# Patient Record
Sex: FEMALE | Race: WHITE | Employment: OTHER | URBAN - METROPOLITAN AREA
[De-identification: names, ages, dates, MRNs, and addresses within clinical notes are randomized per-mention and may not be internally consistent; named-entity substitution may affect disease eponyms.]

---

## 2017-01-16 ENCOUNTER — OFFICE VISIT (OUTPATIENT)
Dept: FAMILY MEDICINE | Facility: CLINIC | Age: 82
End: 2017-01-16
Payer: MEDICARE

## 2017-01-16 VITALS
WEIGHT: 119 LBS | SYSTOLIC BLOOD PRESSURE: 114 MMHG | TEMPERATURE: 97.9 F | BODY MASS INDEX: 20.32 KG/M2 | HEART RATE: 76 BPM | HEIGHT: 64 IN | DIASTOLIC BLOOD PRESSURE: 60 MMHG

## 2017-01-16 DIAGNOSIS — Z78.0 ASYMPTOMATIC POSTMENOPAUSAL STATUS: ICD-10-CM

## 2017-01-16 DIAGNOSIS — Z23 NEED FOR PROPHYLACTIC VACCINATION AGAINST STREPTOCOCCUS PNEUMONIAE (PNEUMOCOCCUS): ICD-10-CM

## 2017-01-16 DIAGNOSIS — M25.561 ACUTE PAIN OF RIGHT KNEE: ICD-10-CM

## 2017-01-16 DIAGNOSIS — I10 HYPERTENSION GOAL BP (BLOOD PRESSURE) < 140/90: ICD-10-CM

## 2017-01-16 DIAGNOSIS — R05.3 CHRONIC COUGH: ICD-10-CM

## 2017-01-16 DIAGNOSIS — R39.15 URINARY URGENCY: Primary | ICD-10-CM

## 2017-01-16 DIAGNOSIS — R41.3 MEMORY PROBLEM: ICD-10-CM

## 2017-01-16 PROCEDURE — 99214 OFFICE O/P EST MOD 30 MIN: CPT | Performed by: PHYSICIAN ASSISTANT

## 2017-01-16 RX ORDER — CARVEDILOL 25 MG/1
25 TABLET ORAL 2 TIMES DAILY WITH MEALS
Qty: 180 TABLET | Refills: 1 | Status: SHIPPED | OUTPATIENT
Start: 2017-01-16 | End: 2017-08-04

## 2017-01-16 RX ORDER — OXYBUTYNIN CHLORIDE 5 MG/1
TABLET ORAL
Qty: 30 TABLET | Refills: 0 | Status: SHIPPED | OUTPATIENT
Start: 2017-01-16 | End: 2017-06-26

## 2017-01-16 RX ORDER — AMLODIPINE BESYLATE 5 MG/1
5 TABLET ORAL DAILY
Qty: 90 TABLET | Refills: 1 | Status: SHIPPED | OUTPATIENT
Start: 2017-01-16 | End: 2017-10-30

## 2017-01-16 ASSESSMENT — PAIN SCALES - GENERAL: PAINLEVEL: SEVERE PAIN (6)

## 2017-01-16 NOTE — NURSING NOTE
"Chief Complaint   Patient presents with     Nocturia     Follow up - Ongoing issue, no pain or blood in urine. Pt states that she gets up about 5 times per night.      Knee Pain     Right knee pain x3 days        Initial /60 mmHg  Pulse 76  Temp(Src) 97.9  F (36.6  C) (Oral)  Ht 5' 3.5\" (1.613 m)  Wt 119 lb (53.978 kg)  BMI 20.75 kg/m2 Estimated body mass index is 20.75 kg/(m^2) as calculated from the following:    Height as of this encounter: 5' 3.5\" (1.613 m).    Weight as of this encounter: 119 lb (53.978 kg).  BP completed using cuff size: july Brown CMA ([facility])      "

## 2017-01-16 NOTE — MR AVS SNAPSHOT
After Visit Summary   1/16/2017    Ghada Magana    MRN: [id number]           Patient Information     Date Of Birth          1935        Visit Information        Provider Department      1/16/2017 9:00 AM Chip Barlow PA-C [facility]        Today's Diagnoses     Urinary urgency    -  1     Chronic cough         Memory problem         Asymptomatic postmenopausal status         Need for prophylactic vaccination against Streptococcus pneumoniae (pneumococcus)         Acute pain of right knee         Hypertension goal BP (blood pressure) < 140/90           Care Instructions    1. Dr. [facility] - [phone] - she needs to be reevaluated for mycobacterium (type of bacterial infection) - probably need CT scan and possibly scope into lungs to look. Won't do antibiotics today - Dr. Woodall needs to determine the best ones  2. Memory problem - think about seeing memory clinic at [place]   3. Right knee, - ice 10 minutes 2-3 times a day, can take 1 to 2, 200 mg ibuprofen, if Voltaren (Diclofenac) cream is paid for by insurance, can use that   4. Ditropan (Oxybutinin) can use 1/2 at bedtime for urinary frequency   5. Use Refresh eye drops for dry eyes           Follow-ups after your visit        Additional Services     NEUROLOGY ADULT REFERRAL       Your provider has referred you to: [facility]: Neurology Clinic - [place] [phone]   [email or web address]  General Neurology - Memory Problem     Reason for Referral: Consult    Please be aware that coverage of these services is subject to the terms and limitations of your health insurance plan.  Call member services at your health plan with any benefit or coverage questions.      Please bring the following with you to your appointment:    (1) Any X-Rays, CTs or MRIs which have been performed.  Contact the facility where they were done to arrange for   "prior to your scheduled appointment.    (2) List of current medications  (3) This referral request   (4) Any documents/labs given to you for this referral                  Who to contact     If you have questions or need follow up information about today's clinic visit or your schedule please contact [facility] directly at [phone].  Normal or non-critical lab and imaging results will be communicated to you by MyChart, letter or phone within 4 business days after the clinic has received the results. If you do not hear from us within 7 days, please contact the clinic through Smartesting[employer] or phone. If you have a critical or abnormal lab result, we will notify you by phone as soon as possible.  Submit refill requests through [employer] or call your pharmacy and they will forward the refill request to us. Please allow 3 business days for your refill to be completed.          Additional Information About Your Visit        MyChart Information     [employer] lets you send messages to your doctor, view your test results, renew your prescriptions, schedule appointments and more. To sign up, go to [email or web address] . Click on \"Log in\" on the left side of the screen, which will take you to the Welcome page. Then click on \"Sign up Now\" on the right side of the page.     You will be asked to enter the access code listed below, as well as some personal information. Please follow the directions to create your username and password.     Your access code is: [email or web address]  Expires: 2017  9:38 AM     Your access code will  in 90 days. If you need help or a new code, please call your [place] clinic or [phone].        Care EveryWhere ID     This is your Care EveryWhere ID. This could be used by other organizations to access your [place] medical records  [id number]        Your Vitals Were     Pulse Temperature Height BMI (Body Mass Index)          76 97.9  F (36.6  C) (Oral) 5' 3.5\" (1.613 m) 20.75 " kg/m2         Blood Pressure from Last 3 Encounters:   01/16/17 114/60   05/12/16 132/68   03/28/16 171/76    Weight from Last 3 Encounters:   01/16/17 119 lb (53.978 kg)   05/12/16 121 lb (54.885 kg)   03/28/16 119 lb 12.8 oz (54.341 kg)              We Performed the Following     *UA reflex to Microscopic and Culture ([facility] [place] and [facility] (except [place] and [place])     NEUROLOGY ADULT REFERRAL          Today's Medication Changes          These changes are accurate as of: 1/16/17  9:38 AM.  If you have any questions, ask your nurse or doctor.               Start taking these medicines.        Dose/Directions    diclofenac 1 % Gel topical gel   Commonly known as:  VOLTAREN   Used for:  Acute pain of right knee   Started by:  Chip Barlow PA-C        Apply 4 grams to knees or 2 grams to hands four times daily using enclosed dosing card.   Quantity:  100 g   Refills:  1         These medicines have changed or have updated prescriptions.        Dose/Directions    oxybutynin 5 MG tablet   Commonly known as:  DITROPAN   This may have changed:  additional instructions   Used for:  Urinary urgency   Changed by:  Chip Barlow PA-C        1/2 tablet at bedtime   Quantity:  30 tablet   Refills:  0            Where to get your medicines      These medications were sent to [facility] [address] [place] [address] [address] [facility]  [address] [facility] [address]     Phone:  [phone]    - amLODIPine 5 MG tablet  - carvedilol 25 MG tablet  - diclofenac 1 % Gel topical gel  - oxybutynin 5 MG tablet             Primary Care Provider Office Phone # Fax #    Chip Barlow PA-C [phone]       [facility] [address] [facility] [address]        Thank you!     Thank you for choosing [facility]  for your care. Our goal is always to provide you with excellent care. Hearing back from our patients is one way we can  continue to improve our services. Please take a few minutes to complete the written survey that you may receive in the mail after your visit with us. Thank you!             Your Updated Medication List - Protect others around you: Learn how to safely use, store and throw away your medicines at www.disposemymeds.org.          This list is accurate as of: 1/16/17  9:38 AM.  Always use your most recent med list.                   Brand Name Dispense Instructions for use    amLODIPine 5 MG tablet    NORVASC    90 tablet    Take 1 tablet (5 mg) by mouth daily       aspirin 81 MG tablet     100    1 tab po QD (Once per day)       CALCIUM 600 + D 600-200 MG-UNIT Tabs     0    2 per day.       carvedilol 25 MG tablet    COREG    180 tablet    Take 1 tablet (25 mg) by mouth 2 times daily (with meals)       diclofenac 1 % Gel topical gel    VOLTAREN    100 g    Apply 4 grams to knees or 2 grams to hands four times daily using enclosed dosing card.       fluticasone-salmeterol 115-21 MCG/ACT Inhaler    ADVAIR-HFA    36 g    Inhale 2 puffs into the lungs 2 times daily       losartan 100 MG tablet    COZAAR    90 tablet    Take 1 tablet (100 mg) by mouth daily       Multi-vitamin Tabs tablet   Generic drug:  multivitamin, therapeutic with minerals      take one daily       oxybutynin 5 MG tablet    DITROPAN    30 tablet    1/2 tablet at bedtime       TYLENOL 325 MG tablet   Generic drug:  acetaminophen      2 TABLETS EVERY 4 HOURS AS NEEDED       UNKNOWN MED DOSAGE      timolol eye gtts one drop in each eye twice daily

## 2017-01-16 NOTE — PROGRESS NOTES
"  SUBJECTIVE:                                                    Ghada Magana is a 81 year old female who presents to clinic today for the following health issues:    Joint Pain     Onset: 3 days      Description:   Location: Right knee   Character: Sharp and Dull ache    Intensity: moderate, having trouble getting out of bed.    Progression of Symptoms: worse    Accompanying Signs & Symptoms:  Other symptoms: swelling and weakness of right leg    History:   Previous similar pain: no       Precipitating factors:   Trauma or overuse: no     Alleviating factors:  Improved by: heat       Therapies Tried and outcome: Heat was somewhat helpful     Nocturia - up to urinate 5 times at night. This has been ongoing for years. She's not bothered by it. More a concern of her 's. She didn't try the oxybutynin we discussed. She has no new or differing symptoms.     Cough - chronic, multi year. Has seen [facility] Dr. Woodall at [facility]. 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 No COPD. No other known lung disease.     BP stable, no concerns - needs medication refills.    Her  is very worried about memory. He reports he's finding things where they don't below. Abbey admits she's forgetting names and sometimes \"forgetful.\" She's not forthcoming other than that.     Problem list and histories reviewed & adjusted, as indicated.  Additional history: as documented    Problem list, Medication list, Allergies, and Medical/Social/Surgical histories reviewed in [facility] and updated as appropriate.    ROS:  Constitutional, HEENT, cardiovascular, pulmonary, gi and gu systems are negative, except as otherwise noted.    OBJECTIVE:                                                    /60 mmHg  Pulse 76  Temp(Src) 97.9  F (36.6  C) (Oral)  Ht 5' 3.5\" (1.613 " m)  Wt 119 lb (53.978 kg)  BMI 20.75 kg/m2  Body mass index is 20.75 kg/(m^2).  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 This issue bothers her  more than Pat. She's had this for years. I offered a very low dose of Oxybutynin at bedtime if she wants to try. She agrees.  I reviewed Beers criteria and precautions.     (R05) Chronic cough  Comment:   Plan: Reviewed. She's not concerned. It is stable. Unchanged. Recommend they get back into the Pulmonary Clinic.     (R41.3) Memory problem  Comment:   Plan: NEUROLOGY ADULT REFERRAL        As noted - SLUMS testing is 20/30 suggesting MNCD - recommend memory clinic.    (Z78.0) Asymptomatic postmenopausal status  Comment:   Plan: DEXA suggested     (Z23) Need for prophylactic vaccination against Streptococcus pneumoniae (pneumococcus)  Comment:   Plan: Suggested     (M25.561) Acute pain of right knee  Comment:   Plan: diclofenac (VOLTAREN) 1 % GEL topical gel        Recommend PRICE and topicals, oral ibuprofen 200 to 400 mg 2 times daily for a few days would also be fine    (I10) Hypertension goal BP (blood pressure) < 140/90  Comment:   Plan: amLODIPine (NORVASC) 5 MG tablet, carvedilol         (COREG) 25 MG tablet        Stable.     Follow up as needed.  LYNN Wei, PACatrachitaC

## 2017-02-01 ENCOUNTER — TRANSFERRED RECORDS (OUTPATIENT)
Dept: HEALTH INFORMATION MANAGEMENT | Facility: CLINIC | Age: 82
End: 2017-02-01

## 2017-02-20 DIAGNOSIS — I10 HYPERTENSION GOAL BP (BLOOD PRESSURE) < 140/90: ICD-10-CM

## 2017-02-20 NOTE — TELEPHONE ENCOUNTER
losartan (COZAAR) 100 MG tablet      Last Written Prescription Date: 11/11/2016  Last Fill Quantity: 90, # refills: 0  Last Office Visit with G, P or [facility] prescribing provider: 1/16/2017       Potassium   Date Value Ref Range Status   03/28/2016 4.0 3.4 - 5.3 mmol/L Final     Creatinine   Date Value Ref Range Status   03/28/2016 0.65 0.52 - 1.04 mg/dL Final     BP Readings from Last 3 Encounters:   01/16/17 114/60   05/12/16 132/68   03/28/16 171/76

## 2017-02-21 RX ORDER — LOSARTAN POTASSIUM 100 MG/1
100 TABLET ORAL DAILY
Qty: 90 TABLET | Refills: 0 | Status: SHIPPED | OUTPATIENT
Start: 2017-02-21 | End: 2017-06-09

## 2017-02-21 NOTE — TELEPHONE ENCOUNTER
Prescription approved per [facility].     Wendy Nicholas RN   February 21, 2017 11:42 AM  [facility] Triage   [phone]

## 2017-02-27 ENCOUNTER — TELEPHONE (OUTPATIENT)
Dept: NURSING | Facility: CLINIC | Age: 82
End: 2017-02-27

## 2017-02-27 DIAGNOSIS — R41.3 MEMORY PROBLEM: Primary | ICD-10-CM

## 2017-02-27 NOTE — TELEPHONE ENCOUNTER
Ghada's  Neeraj would like a call back from Ervin GUTIERREZ regarding referral for Ghada to  regarding memory issue/s.  Yarely Walls RN

## 2017-02-27 NOTE — TELEPHONE ENCOUNTER
Reviewed. Sorry to hear there are continued issues. I've talked with Neeraj at length at a few of his appointments and I discussed with Ghada at hers and she denies issues.    I agree though that an evaluation is extremely warranted and there's a referral in Neurology. Please encourage them to make an appointment.    Thank you.  Chpi Barlow, LYNN, PA-C

## 2017-02-28 NOTE — TELEPHONE ENCOUNTER
Called and spoke with patient and  (given verbal ok from patient-no consent to communicate on file). Gave phone number for neurology clinic, they will make an appointment.    Aldair Leach RN

## 2017-06-02 ENCOUNTER — PRE VISIT (OUTPATIENT)
Dept: NEUROLOGY | Facility: CLINIC | Age: 82
End: 2017-06-02

## 2017-06-02 NOTE — TELEPHONE ENCOUNTER
1.  Date/reason for appt:6/8/17, Memory issues   2.  Referring provider: MERON EASON  3.  Call to patient (Yes / No - short description): No, referred   4.  Previous care at / records requested from:   [facility]- office notes are in epic.

## 2017-06-08 ENCOUNTER — OFFICE VISIT (OUTPATIENT)
Dept: NEUROLOGY | Facility: CLINIC | Age: 82
End: 2017-06-08

## 2017-06-08 VITALS — DIASTOLIC BLOOD PRESSURE: 70 MMHG | HEIGHT: 64 IN | HEART RATE: 81 BPM | SYSTOLIC BLOOD PRESSURE: 148 MMHG

## 2017-06-08 DIAGNOSIS — R41.3 MEMORY LOSS: Primary | ICD-10-CM

## 2017-06-08 DIAGNOSIS — R41.3 MEMORY LOSS: ICD-10-CM

## 2017-06-08 LAB
ALBUMIN SERPL-MCNC: 4 G/DL (ref 3.4–5)
ALP SERPL-CCNC: 94 U/L (ref 40–150)
ALT SERPL W P-5'-P-CCNC: 21 U/L (ref 0–50)
ANION GAP SERPL CALCULATED.3IONS-SCNC: 9 MMOL/L (ref 3–14)
AST SERPL W P-5'-P-CCNC: 22 U/L (ref 0–45)
BILIRUB SERPL-MCNC: 0.4 MG/DL (ref 0.2–1.3)
BUN SERPL-MCNC: 26 MG/DL (ref 7–30)
CALCIUM SERPL-MCNC: 9.5 MG/DL (ref 8.5–10.1)
CHLORIDE SERPL-SCNC: 102 MMOL/L (ref 94–109)
CO2 SERPL-SCNC: 25 MMOL/L (ref 20–32)
CREAT SERPL-MCNC: 0.74 MG/DL (ref 0.52–1.04)
ERYTHROCYTE [DISTWIDTH] IN BLOOD BY AUTOMATED COUNT: 13.1 % (ref 10–15)
GFR SERPL CREATININE-BSD FRML MDRD: 75 ML/MIN/1.7M2
GLUCOSE SERPL-MCNC: 155 MG/DL (ref 70–99)
HCT VFR BLD AUTO: 36.5 % (ref 35–47)
HGB BLD-MCNC: 11.9 G/DL (ref 11.7–15.7)
MCH RBC QN AUTO: 30.4 PG (ref 26.5–33)
MCHC RBC AUTO-ENTMCNC: 32.6 G/DL (ref 31.5–36.5)
MCV RBC AUTO: 93 FL (ref 78–100)
PLATELET # BLD AUTO: 358 10E9/L (ref 150–450)
POTASSIUM SERPL-SCNC: 4.7 MMOL/L (ref 3.4–5.3)
PROT SERPL-MCNC: 8.7 G/DL (ref 6.8–8.8)
RBC # BLD AUTO: 3.92 10E12/L (ref 3.8–5.2)
SODIUM SERPL-SCNC: 136 MMOL/L (ref 133–144)
TSH SERPL DL<=0.005 MIU/L-ACNC: 1.81 MU/L (ref 0.4–4)
WBC # BLD AUTO: 8.5 10E9/L (ref 4–11)

## 2017-06-08 ASSESSMENT — ENCOUNTER SYMPTOMS
SEIZURES: 0
RESPIRATORY PAIN: 0
NECK MASS: 0
TROUBLE SWALLOWING: 0
FLANK PAIN: 0
SPUTUM PRODUCTION: 0
SINUS PAIN: 0
COUGH DISTURBING SLEEP: 1
PARALYSIS: 0
TASTE DISTURBANCE: 0
DIFFICULTY URINATING: 0
TREMORS: 0
SHORTNESS OF BREATH: 0
SINUS CONGESTION: 0
HEMOPTYSIS: 0
HEADACHES: 0
HOARSE VOICE: 1
SMELL DISTURBANCE: 1
POSTURAL DYSPNEA: 0
DYSPNEA ON EXERTION: 0
DIZZINESS: 0
SPEECH CHANGE: 1
COUGH: 1
TINGLING: 0
MEMORY LOSS: 1
LOSS OF CONSCIOUSNESS: 0
HEMATURIA: 0
DYSURIA: 0
SNORES LOUDLY: 0
NUMBNESS: 0
DISTURBANCES IN COORDINATION: 0
SORE THROAT: 0
WHEEZING: 1
WEAKNESS: 0

## 2017-06-08 ASSESSMENT — PAIN SCALES - GENERAL: PAINLEVEL: NO PAIN (0)

## 2017-06-08 NOTE — LETTER
2017        BRENDA Ashraf   [facility]    [address] [place] [address]      RE: Ghada Magana    MRN: [id number]    : 1935              Dear Mr. Barlow:        Ms. Magana is a very pleasant 82-year-old who comes accompanied by her .      The report is that she has had a progressive memory loss over the last few years with forgetting names of people, birthdays, even at times may forget her 's name.  She has been driving but they limited the driving to the local area without any freeway driving.  She has no family history of dementia.  She is a lady who has enjoyed relatively good health throughout her life who carries a diagnosis of congenital anomaly of the lung, hypertension, hyperlipidemia, chronic cough, anemia and osteoporosis.  The anemia has been the subject of investigation and has been found to be hemoglobin of 10.  The last time it was done was a year ago and more anemia of chronic diseases.  She is status post bronchoscopy and has a diagnosis of unspecified essential hypertension.  She is on losartan, Ditropan, diclofenac, amlodipine, carvedilol, aspirin and multivitamin.        FAMILY HISTORY:  Negative for dementia in early age, she has no history of strokes, seizures, convulsions or other.   was not able to give much information.      SOCIAL HISTORY:  Lives at home, has several children.  She takes care of a grandchild who is 2 years old.      REVIEW OF SYMPTOMS:  As given.        PHYSICAL EXAMINATION:  She is a pleasant and petite woman.  Her weight is probably 119 pounds.  Blood pressure 148/70, pulse is 81.  Her weight is essentially stable from 2016 to January.  She walks well.  She has no motor deficit.  Muscle tone is normal.  There are no tremors.  There is no focal weakness.  She has absent ankle reflexes on the left, but she has had some foot trouble.  There are no frontal release signs.  She scored very  poorly on the MoCA with an estimated score of 15 out of 30.  Her affect is fairly normal.  Her cranial nerves are unremarkable and that she shows no Babinski signs in either side.  Her sensory exam is normal.      In summary, she has a profound memory difficulty and had on the MoCA a picture of difficulty identifying items and doing sequential visuospatial activity and with a poor visuospatial orientation and with profound memory disturbance.  The findings are compatible with probably dementia of the Alzheimer's type.  She had a CT scan a few years ago which did show mild cortical atrophy and we will repeat the CT and compare.  She has had anemia of chronic disease with last hemoglobin a year ago at 10.  We checked thyroid as well as a comprehensive panel.  We told her emphatically she cannot drive at this time and will see her shortly after the CAT scan is completed to ascertain the degree of atrophy and we might do some neuropsych testing depending on MRI which she did have in .        Sincerely,        Dao Ordonez MD       D: 2017 17:03   T: 2017 12:38   MT: alyssa    Name:     RYAN MASTERSON   MRN:      [id number]        Account:      [id number]   :      1935           Service Date: 2017    Document: [id number]

## 2017-06-08 NOTE — MR AVS SNAPSHOT
After Visit Summary   6/8/2017    Ghada Magana    MRN: [id number]           Patient Information     Date Of Birth          1935        Visit Information        Provider Department      6/8/2017 4:00 PM Dao Ordonez MD [facility]        Today's Diagnoses     Memory loss    -  1       Follow-ups after your visit        Your next 10 appointments already scheduled     Jun 08, 2017  5:30 PM CDT   LAB with  LAB   [facility] ([facility])    [address] [place] [address] [phone]           Patient must bring picture ID.  Patient should be prepared to give a urine specimen  Please do not eat 10-12 hours before your appointment if you are coming in fasting for labs on lipids, cholesterol, or glucose (sugar).  Pregnant women should follow their Care Team instructions. Water with medications is okay. Do not drink coffee or other fluids.   If you have concerns about taking  your medications, please ask at office or if scheduling via [employer] ([employer]), send a message by clicking on Secure Messaging, Message Your Care Team.            Jun 08, 2017  5:40 PM CDT   CT HEAD W/O CONTRAST with UCCT2   [facility] ([facility])    [address] [place] [address] [phone]           Please bring any scans or X-rays taken at other hospitals, if similar tests were done. Also bring a list of your medicines, including vitamins, minerals and over-the-counter drugs. It is safest to leave personal items at home.  Be sure to tell your doctor:   If you have any allergies.   If there s any chance you are pregnant.   If you are breastfeeding.   If you have any special needs.  You do not need to do anything special to prepare.  Please wear loose clothing, such as a sweat suit or jogging clothes. Avoid snaps, zippers and other metal. We may ask you to undress and put on a hospital gown.             Aug 03, 2017  2:30 PM CDT   (Arrive by 2:15 PM)   Return Visit with Dao Ordonez MD   [facility] ([facility])    [address] [address] [phone]              Future tests that were ordered for you today     Open Future Orders        Priority Expected Expires Ordered    CT Head w/o contrast Routine  2018    CBC with platelets Routine  2018    Comprehensive metabolic panel Routine  2018    TSH with free T4 reflex Routine  2018            Who to contact     Please call your clinic at [phone] to:    Ask questions about your health    Make or cancel appointments    Discuss your medicines    Learn about your test results    Speak to your doctor   If you have compliments or concerns about an experience at your clinic, or if you wish to file a complaint, please contact [facility] Patient Relations at [phone] or email us at [email or web address]         Additional Information About Your Visit        hoozin Information     hoozin is an electronic gateway that provides easy, online access to your medical records. With hoozin, you can request a clinic appointment, read your test results, renew a prescription or communicate with your care team.     To sign up for hoozin visit the website at [email or web address]   You will be asked to enter the access code listed below, as well as some personal information. Please follow the directions to create your username and password.     Your access code is: [email or web address]  Expires: 2017  6:30 AM     Your access code will  in 90 days. If you need help or a new code, please contact your [facility] Clinic or call [phone] for assistance.        Care EveryWhere ID     This is your Care EveryWhere ID. This could be used by other organizations to access your [place]  "medical records  [id number]        Your Vitals Were     Pulse Height                81 1.613 m (5' 3.5\")           Blood Pressure from Last 3 Encounters:   06/08/17 148/70   01/16/17 114/60   05/12/16 132/68    Weight from Last 3 Encounters:   01/16/17 54 kg (119 lb)   05/12/16 54.9 kg (121 lb)   03/28/16 54.3 kg (119 lb 12.8 oz)               Primary Care Provider Office Phone # Fax #    Chip Barlow PA-C [phone]       [facility] [address] [facility] [address]        Thank you!     Thank you for choosing [facility]  for your care. Our goal is always to provide you with excellent care. Hearing back from our patients is one way we can continue to improve our services. Please take a few minutes to complete the written survey that you may receive in the mail after your visit with us. Thank you!             Your Updated Medication List - Protect others around you: Learn how to safely use, store and throw away your medicines at www.disposemymeds.org.          This list is accurate as of: 6/8/17  5:22 PM.  Always use your most recent med list.                   Brand Name Dispense Instructions for use    amLODIPine 5 MG tablet    NORVASC    90 tablet    Take 1 tablet (5 mg) by mouth daily       aspirin 81 MG tablet     100    1 tab po QD (Once per day)       CALCIUM 600 + D 600-200 MG-UNIT Tabs     0    2 per day.       carvedilol 25 MG tablet    COREG    180 tablet    Take 1 tablet (25 mg) by mouth 2 times daily (with meals)       diclofenac 1 % Gel topical gel    VOLTAREN    100 g    Apply 4 grams to knees or 2 grams to hands four times daily using enclosed dosing card.       fluticasone-salmeterol 115-21 MCG/ACT Inhaler    ADVAIR-HFA    36 g    Inhale 2 puffs into the lungs 2 times daily       losartan 100 MG tablet    COZAAR    90 tablet    Take 1 tablet (100 mg) by mouth daily       Multi-vitamin Tabs tablet   Generic drug:  multivitamin, therapeutic " with minerals      take one daily       oxybutynin 5 MG tablet    DITROPAN    30 tablet    1/2 tablet at bedtime       TYLENOL 325 MG tablet   Generic drug:  acetaminophen      2 TABLETS EVERY 4 HOURS AS NEEDED       UNKNOWN MED DOSAGE      timolol eye gtts one drop in each eye twice daily

## 2017-06-09 ENCOUNTER — CARE COORDINATION (OUTPATIENT)
Dept: NEUROLOGY | Facility: CLINIC | Age: 82
End: 2017-06-09

## 2017-06-09 DIAGNOSIS — I10 HYPERTENSION GOAL BP (BLOOD PRESSURE) < 140/90: ICD-10-CM

## 2017-06-09 NOTE — TELEPHONE ENCOUNTER
losartan (COZAAR) 100 MG tablet   100 mg, DAILY 0 ordered  Edit     Summary: Take 1 tablet (100 mg) by mouth daily, Disp-90 tablet, R-0, E-Prescribe   Dose, Route, Frequency: 100 mg, Oral, DAILY  Start: 2/21/2017  Ord/Sold: 2/21/2017 (O)  Report  Taking:   Long-term:   Pharmacy: [facility] [address] [place] [address] Dose History       Patient Sig: Take 1 tablet (100 mg) by mouth daily       Ordered on: 2/21/2017       Authorized by: MERON EASON       Dispense: 90 tablet          Last Office Visit with [facility], [facility] or [facility] prescribing provider: 1-       Potassium   Date Value Ref Range Status   06/08/2017 4.7 3.4 - 5.3 mmol/L Final     Creatinine   Date Value Ref Range Status   06/08/2017 0.74 0.52 - 1.04 mg/dL Final     BP Readings from Last 3 Encounters:   06/08/17 148/70   01/16/17 114/60   05/12/16 132/68

## 2017-06-09 NOTE — PROGRESS NOTES
Called pt with normal lab results per Dr Ordonez. Patient verbalized understanding. Waiting on imaging results.

## 2017-06-09 NOTE — PROGRESS NOTES
2017        BRENDA Ashraf   [facility]    [address] [place] [address]      RE: Ghada Magana    MRN: [id number]    : 1935              Dear Mr. Barlow:        Ms. Magana is a very pleasant 82-year-old who comes accompanied by her .      The report is that she has had a progressive memory loss over the last few years with forgetting names of people, birthdays, even at times may forget her 's name.  She has been driving but they limited the driving to the local area without any freeway driving.  She has no family history of dementia.  She is a lady who has enjoyed relatively good health throughout her life who carries a diagnosis of congenital anomaly of the lung, hypertension, hyperlipidemia, chronic cough, anemia and osteoporosis.  The anemia has been the subject of investigation and has been found to be hemoglobin of 10.  The last time it was done was a year ago and more anemia of chronic diseases.  She is status post bronchoscopy and has a diagnosis of unspecified essential hypertension.  She is on losartan, Ditropan, diclofenac, amlodipine, carvedilol, aspirin and multivitamin.        FAMILY HISTORY:  Negative for dementia in early age, she has no history of strokes, seizures, convulsions or other.   was not able to give much information.      SOCIAL HISTORY:  Lives at home, has several children.  She takes care of a grandchild who is 2 years old.      REVIEW OF SYMPTOMS:  As given.        PHYSICAL EXAMINATION:  She is a pleasant and petite woman.  Her weight is probably 119 pounds.  Blood pressure 148/70, pulse is 81.  Her weight is essentially stable from 2016 to January.  She walks well.  She has no motor deficit.  Muscle tone is normal.  There are no tremors.  There is no focal weakness.  She has absent ankle reflexes on the left, but she has had some foot trouble.  There are no frontal release signs.  She scored very  poorly on the MoCA with an estimated score of 15 out of 30.  Her affect is fairly normal.  Her cranial nerves are unremarkable and that she shows no Babinski signs in either side.  Her sensory exam is normal.      In summary, she has a profound memory difficulty and had on the MoCA a picture of difficulty identifying items and doing sequential visuospatial activity and with a poor visuospatial orientation and with profound memory disturbance.  The findings are compatible with probably dementia of the Alzheimer's type.  She had a CT scan a few years ago which did show mild cortical atrophy and we will repeat the CT and compare.  She has had anemia of chronic disease with last hemoglobin a year ago at 10.  We checked thyroid as well as a comprehensive panel.  We told her emphatically she cannot drive at this time and will see her shortly after the CAT scan is completed to ascertain the degree of atrophy and we might do some neuropsych testing depending on MRI which she did have in .        Sincerely,        MD RIC Reynolds MD             D: 2017 17:03   T: 2017 12:38   MT: alyssa      Name:     RYAN MASTERSON   MRN:      [id number]        Account:      [id number]   :      1935           Service Date: 2017      Document: [id number]

## 2017-06-15 RX ORDER — LOSARTAN POTASSIUM 100 MG/1
TABLET ORAL
Qty: 90 TABLET | Refills: 0 | Status: SHIPPED | OUTPATIENT
Start: 2017-06-15 | End: 2017-09-25

## 2017-06-15 NOTE — TELEPHONE ENCOUNTER
Prescription approved per [facility] Refill Protocol.     Wendy Nicholas RN

## 2017-06-26 ENCOUNTER — OFFICE VISIT (OUTPATIENT)
Dept: FAMILY MEDICINE | Facility: CLINIC | Age: 82
End: 2017-06-26
Payer: MEDICARE

## 2017-06-26 VITALS
BODY MASS INDEX: 19.85 KG/M2 | HEIGHT: 64 IN | DIASTOLIC BLOOD PRESSURE: 68 MMHG | WEIGHT: 116.25 LBS | HEART RATE: 68 BPM | TEMPERATURE: 98.4 F | SYSTOLIC BLOOD PRESSURE: 128 MMHG

## 2017-06-26 DIAGNOSIS — R41.3 MEMORY PROBLEM: Primary | ICD-10-CM

## 2017-06-26 DIAGNOSIS — M21.611 BUNION, RIGHT: ICD-10-CM

## 2017-06-26 PROCEDURE — 99214 OFFICE O/P EST MOD 30 MIN: CPT | Performed by: PHYSICIAN ASSISTANT

## 2017-06-26 NOTE — NURSING NOTE
"Chief Complaint   Patient presents with     Foot Problems       Initial There were no vitals taken for this visit. Estimated body mass index is 20.75 kg/(m^2) as calculated from the following:    Height as of 1/16/17: 5' 3.5\" (1.613 m).    Weight as of 1/16/17: 119 lb (54 kg).  Medication Reconciliation: complete   Tomeka Gonzalez CMA      "

## 2017-06-26 NOTE — MR AVS SNAPSHOT
After Visit Summary   6/26/2017    Ghada Magana    MRN: [id number]           Patient Information     Date Of Birth          1935        Visit Information        Provider Department      6/26/2017 2:20 PM Chip Barlow PA-C [facility]        Today's Diagnoses     Memory problem    -  1    Bunion, right          Care Instructions    Consider seeing for neurology follow up / memory problems:  Dr. De Anda  [facility]  [address] [place] [address]  [phone]  Fax: [phone]            Follow-ups after your visit        Additional Services     NEUROLOGY ADULT REFERRAL       Your provider has referred you to: [facility]: [facility] [phone]   [email or web address]    Reason for Referral: Consult    Please be aware that coverage of these services is subject to the terms and limitations of your health insurance plan.  Call member services at your health plan with any benefit or coverage questions.      Please bring the following with you to your appointment:    (1) Any X-Rays, CTs or MRIs which have been performed.  Contact the facility where they were done to arrange for  prior to your scheduled appointment.    (2) List of current medications  (3) This referral request   (4) Any documents/labs given to you for this referral            PODIATRY/FOOT & ANKLE SURGERY REFERRAL       Your provider has referred you to: G: [facility] [phone]   [email or web address]    Please be aware that coverage of these services is subject to the terms and limitations of your health insurance plan.  Call member services at your health plan with any benefit or coverage questions.      Please bring the following to your appointment:  >>   Any x-rays, CTs or MRIs which have been performed.  Contact the facility where they were done to  "arrange for  prior to your scheduled appointment.    >>   List of current medications   >>   This referral request   >>   Any documents/labs given to you for this referral                  Your next 10 appointments already scheduled     Aug 03, 2017  2:30 PM CDT   (Arrive by 2:15 PM)   Return Visit with Dao Ordonez MD   [facility] ([facility])    [address]  [address] [facility] [address] [phone]              Who to contact     If you have questions or need follow up information about today's clinic visit or your schedule please contact [facility] directly at [phone].  Normal or non-critical lab and imaging results will be communicated to you by MyChart, letter or phone within 4 business days after the clinic has received the results. If you do not hear from us within 7 days, please contact the clinic through MyChart or phone. If you have a critical or abnormal lab result, we will notify you by phone as soon as possible.  Submit refill requests through [employer] or call your pharmacy and they will forward the refill request to us. Please allow 3 business days for your refill to be completed.          Additional Information About Your Visit        MyChart Information     [employer] lets you send messages to your doctor, view your test results, renew your prescriptions, schedule appointments and more. To sign up, go to [email or web address] . Click on \"Log in\" on the left side of the screen, which will take you to the Welcome page. Then click on \"Sign up Now\" on the right side of the page.     You will be asked to enter the access code listed below, as well as some personal information. Please follow the directions to create your username and password.     Your access code is: [email or web address]  Expires: 2017  6:30 AM     Your access code will  in 90 days. If you need help or a new code, please call your [place] clinic or " "[phone].        Care EveryWhere ID     This is your Care EveryWhere ID. This could be used by other organizations to access your [facility] medical records  [id number]        Your Vitals Were     Pulse Temperature Height BMI (Body Mass Index)          68 98.4  F (36.9  C) (Oral) 5' 3.5\" (1.613 m) 20.27 kg/m2         Blood Pressure from Last 3 Encounters:   06/26/17 128/68   06/08/17 148/70   01/16/17 114/60    Weight from Last 3 Encounters:   06/26/17 116 lb 4 oz (52.7 kg)   01/16/17 119 lb (54 kg)   05/12/16 121 lb (54.9 kg)              We Performed the Following     NEUROLOGY ADULT REFERRAL     PODIATRY/FOOT & ANKLE SURGERY REFERRAL        Primary Care Provider Office Phone # Fax #    Chip Barlow PA-C [phone]       [address] [address]        Equal Access to Services     MARLINE OLIVER : Hadii aad ku hadasho Soomaali, waaxda luqadaha, qaybta kaalmada adeegyada, waxay idiin haylizeth palafox . So [place] -[phone].    ATENCIÓN: Si habla español, tiene a dahl disposición servicios gratuitos de asistencia lingüística. Llame al [phone].    We comply with applicable federal civil rights laws and [place] laws. We do not discriminate on the basis of race, color, national origin, age, disability sex, sexual orientation or gender identity.            Thank you!     Thank you for choosing [facility]  for your care. Our goal is always to provide you with excellent care. Hearing back from our patients is one way we can continue to improve our services. Please take a few minutes to complete the written survey that you may receive in the mail after your visit with us. Thank you!             Your Updated Medication List - Protect others around you: Learn how to safely use, store and throw away your medicines at www.disposemymeds.org.          This list is accurate as of: 6/26/17  3:04 PM.  Always use your most " recent med list.                   Brand Name Dispense Instructions for use Diagnosis    amLODIPine 5 MG tablet    NORVASC    90 tablet    Take 1 tablet (5 mg) by mouth daily    Hypertension goal BP (blood pressure) < 140/90       aspirin 81 MG tablet     100    1 tab po QD (Once per day)        CALCIUM 600 + D 600-200 MG-UNIT Tabs     0    2 per day.        carvedilol 25 MG tablet    COREG    180 tablet    Take 1 tablet (25 mg) by mouth 2 times daily (with meals)    Hypertension goal BP (blood pressure) < 140/90       losartan 100 MG tablet    COZAAR    90 tablet    TAKE ONE TABLET BY MOUTH ONE TIME DAILY    Hypertension goal BP (blood pressure) < 140/90       Multi-vitamin Tabs tablet   Generic drug:  multivitamin, therapeutic with minerals      take one daily        TYLENOL 325 MG tablet   Generic drug:  acetaminophen      2 TABLETS EVERY 4 HOURS AS NEEDED        UNKNOWN MED DOSAGE      timolol eye gtts one drop in each eye twice daily

## 2017-06-26 NOTE — PROGRESS NOTES
"  SUBJECTIVE:                                                    Ghada Magana is a 82 year old female who presents to clinic today for the following health issues:    Patient is here today for a lump on side of the big toe of her right foot.  Patient has been having pain in this area for about 1 month.    [facility] and hasn't heard anything back yet.  Was told not to drive anymore or cook though.    Problem list and histories reviewed & adjusted, as indicated.  Additional history: as documented    Labs reviewed in EPIC    Reviewed and updated as needed this visit by clinical staff       Reviewed and updated as needed this visit by Provider         ROS:  Constitutional, HEENT, cardiovascular, pulmonary, gi and gu systems are negative, except as otherwise noted.    OBJECTIVE:     /68 (BP Location: Right arm, Cuff Size: Adult Regular)  Pulse 68  Temp 98.4  F (36.9  C) (Oral)  Ht 5' 3.5\" (1.613 m)  Wt 116 lb 4 oz (52.7 kg)  BMI 20.27 kg/m2  Body mass index is 20.27 kg/(m^2).  GENERAL: healthy, alert and no distress  MS: Bunion deformity on right foot   PSYCH: mentation appears normal, affect normal/bright    ASSESSMENT/PLAN:       (R41.3) Memory problem  (primary encounter diagnosis)  Comment:   Plan: NEUROLOGY ADULT REFERRAL        Reviewed chart together with patient and . Discussed Dr. Ordonez's visit and work up. She has some obvious memory loss - I think neuropsych eval might be indicated for more formalized work up. They want 2nd opinion - will refer her to Dr. De Anda to review Dr. Ordonez's notes and discuss options for management.     (M21.611) Bunion, right  Comment:   Plan: PODIATRY/FOOT & ANKLE SURGERY REFERRAL        Discussed options - refer to podiatry     25 min visit over 50% counseling     MERON EASON PA-C  [facility]  "

## 2017-06-26 NOTE — PATIENT INSTRUCTIONS
Consider seeing for neurology follow up / memory problems:  Dr. De Anda  [place] [id number]  [place], [place] [id number] [phone]  Fax: [phone]

## 2017-07-05 ENCOUNTER — OFFICE VISIT (OUTPATIENT)
Dept: PODIATRY | Facility: CLINIC | Age: 82
End: 2017-07-05
Payer: MEDICARE

## 2017-07-05 VITALS
DIASTOLIC BLOOD PRESSURE: 56 MMHG | SYSTOLIC BLOOD PRESSURE: 122 MMHG | HEIGHT: 64 IN | WEIGHT: 114 LBS | BODY MASS INDEX: 19.46 KG/M2 | TEMPERATURE: 98.5 F | HEART RATE: 80 BPM

## 2017-07-05 DIAGNOSIS — M21.619 BUNION: Primary | ICD-10-CM

## 2017-07-05 PROCEDURE — 99203 OFFICE O/P NEW LOW 30 MIN: CPT | Performed by: PODIATRIST

## 2017-07-05 NOTE — LETTER
7/5/2017         RE: Ghada Magana  [address] [facility] [address]        Dear Colleague,    Thank you for referring your patient, Ghada Magana, to the [facility]. Please see a copy of my visit note below.    PATIENT HISTORY:  Ghada Magana is a 82 year old female who presents to clinic for right foot bunion.  2 years of worsening pain.  Softer shoes help.  Worse in tight shoes.  She uses a toe spacer w/cotton which can help.  No injury.  2-10/10 pain.  I was requested to see this patient for this issue by Jacinto Barlow.    Review of Systems:  Patient denies fever, chills, rash, wound, stiffness, limping, numbness, weakness, heart burn, blood in stool, chest pain with activity, calf pain when walking, shortness of breath with activity, chronic cough, easy bleeding/bruising, swelling of ankles, excessive thirst, fatigue, depression, anxiety.       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     Unspecified essential hypertension         PAST SURGICAL HISTORY:   Past Surgical History:   Procedure Laterality Date     SURGICAL HISTORY OF -       s/p bronchoscopy         MEDICATIONS:   Current Outpatient Prescriptions:      losartan (COZAAR) 100 MG tablet, TAKE ONE TABLET BY MOUTH ONE TIME DAILY , Disp: 90 tablet, Rfl: 0     amLODIPine (NORVASC) 5 MG tablet, Take 1 tablet (5 mg) by mouth daily, Disp: 90 tablet, Rfl: 1     carvedilol (COREG) 25 MG tablet, Take 1 tablet (25 mg) by mouth 2 times daily (with meals), Disp: 180 tablet, Rfl: 1     TYLENOL 325 MG PO TABS, 2 TABLETS EVERY 4 HOURS AS NEEDED, Disp: , Rfl:      CALCIUM 600 + D 600-200 MG-UNIT OR TABS, 2 per day. , Disp: 0, Rfl: 0     UNKNOWN MED DOSAGE, timolol eye gtts one drop in each eye twice daily,  "Disp: , Rfl:      MULTI-VITAMIN OR TABS, take one daily, Disp: , Rfl: 0     ASPIRIN 81 MG OR TABS, 1 tab po QD (Once per day), Disp: 100, Rfl: 3     ALLERGIES:    Allergies   Allergen Reactions     Ace Inhibitors      Cough     Hctz      Increased nightime urination        SOCIAL HISTORY:   Social History     Social History     Marital status:      Spouse name: jeanette pepe     Number of children: 4     Years of education: N/A     Occupational History      Retired     Social History Main Topics     Smoking status: Never Smoker     Smokeless tobacco: Never Used     Alcohol use Yes      Comment: occasionally     Drug use: No     Sexual activity: Yes     Partners: Male     Other Topics Concern     Parent/Sibling W/ Cabg, Mi Or Angioplasty Before 65f 55m? No     Social History Narrative        FAMILY HISTORY:   Family History   Problem Relation Age of Onset     CANCER Daughter      ovarian cancer     CEREBROVASCULAR DISEASE Father      Hypertension Father         EXAM:Vitals: /56 (Cuff Size: Adult Regular)  Pulse 80  Temp 98.5  F (36.9  C) (Oral)  Ht 5' 3.5\" (1.613 m)  Wt 114 lb (51.7 kg)  BMI 19.88 kg/m2  BMI= Body mass index is 19.88 kg/(m^2).    General appearance: Patient is alert and fully cooperative with history & exam.  No sign of distress is noted during the visit.     Psychiatric: Affect is pleasant & appropriate.  Patient appears motivated to improve health.     Respiratory: Breathing is regular & unlabored while sitting.     HEENT: Hearing is intact to spoken word.  Speech is clear.  No gross evidence of visual impairment that would impact ambulation.     Dermatologic: Skin is intact to the right foot without significant lesions, rash or abrasion.  No paronychia or evidence of soft tissue infection is noted.     Vascular: DP & PT pulses are 1/4 on the right.  No significant edema.  Mild varicosities noted.  CFT and skin temperature are normal.     Neurologic: Lower extremity sensation is " intact to light touch.  No evidence of weakness or contracture in the lower extremities.  No evidence of neuropathy.     Musculoskeletal: Hallux abducto valgus is noted with right foot exam. Lateral deviation of the first toe is appreciated. A bump is noted on the medial aspect of the first metatarsal head.  1st toe abuts the 2nd, which has some mild skin irritation medially. Evidence of soft tissue irritation is noted over the dorsal medial aspect of the first metatarsal head. No significant sesamoid pain is noted plantarly. I do not appreciate significant pain with joint motion.     XRs deferred today.    ASSESSMENT: R foot bunion     PLAN:  Reviewed patient's chart in epic.  Discussed condition and treatment options including pros and cons.    We discussed treatment alternatives regarding bunion pain and deformity.  I explained that bunion surgery is mostly to reduce the size of the medial bump although an attempt is made to improve the first toe alignment.  Bunion deformity is usually progressive.    Non-operative treatments were discussed including wide fitting shoes, splints, pads and orthotics.  Wide fitting shoes are likely the most useful non-surgical treatment.  I would not expect much improvement from NSAIDs, injection or bunion night splints.     Potential complications from surgery were discussed.      Patient is aware that surgery is elective, can be avoided if desired.  The recovery process was discussed including impact to work, walking, shoes and daily activities.  I would anticipate up to 12 months for maximum recovery after surgery.    Pt elected to try wider shoes for now.  Anant card and handout given.  F/u as needed.      Shashi Walls DPM, FACFAS      Again, thank you for allowing me to participate in the care of your patient.        Sincerely,        Shashi Walls DPM

## 2017-07-05 NOTE — PATIENT INSTRUCTIONS
"Bunion (hallux abducto valgus)   A bunion is caused by muscle imbalance. The great toe is pulled toward the smaller toes. The metatarsal head is pushed outward creating a lump on the side of your foot. Imbalance is the result of foot structure and instability.   Bunions do not improve with time. They usually enlarge, however this is a fairly slow process. Shoes do not necessarily cause bunions, however, they can hasten development and definitely cause bunions to hurt.   Bunions often run in families. We inherit a certain foot structure, which may be predisposed to bunion development.   Bunion pain is likely a combination of shoes rubbing on the bump, nerve irritation, compression between the toes, joint misalignment, arthritis and altered gait.   Signs & Symptoms of \"Bunions\"   Bunions are usually termed mild, moderate or severe. Just because you have a bunion does not mean you have to have pain. There are some people with very severe bunions and no pain and people with mild bunions and a lot of pain.    1.  Pain on the inside of your foot at the big toe joint (1st MTPJ)    2.  Swelling on the inside of your foot at the big toe joint    3.  Redness on the inside of your foot at the big toe joint    4.  Numbness or burning in the big toe (hallux)    5.  Decreased motion at the big toe joint    6.  Painful bursa (fluid-filled sac) on the inside of your foot at the big toe joint    7.  Pain while wearing shoes -especially shoes too narrow or with high heels     8.  Pain during activities    9.  Corn in between the big toe and second toe    10.  Callous formation on the side or bottom of the big toe or big toe joint    11.  Callous under the second toe joint (2nd MTPJ)    12.  Pain in the second toe joint   Treatment for \"Bunions\"   Conservative (non-surgical) treatment will not make the bunion go away, but it will hopefully decrease the signs and symptoms you have and help you get rid of the pain and get you back to " "your activities.    1.  Wider shoes    2.  Extra depth shoes    3.  Stretch shoes (where bunion is) Cut an \"x\" or cross in the shoe (where bunion is)    4.  Ice    5.  Padding, splints, toe spacers    6.  NSAIDs    7.  Arch supports    8.  Custom orthoses (orthotics)    9.  Change activities    10.  Physical therapy     11.  Surgical treatment for bunions is sometimes needed. If you are limited by pain, cannot fit in shoes comfortably and are not able to do your daily activities then surgery may be a good option for you. There are many different surgical procedures to repair bunions. Your foot doctor (podiatrist) will review your foot exam findings, your x-rays, your age, your health, your lifestyle, your physical activity level and discuss with you which procedure he or she would recommend. Surgical procedures for bunions range from soft tissue repair to cutting and realigning the bones. It is not recommended that you have bunion surgery for cosmetic reasons (you do not like how your foot looks) or because you want to fit in a certain pair of shoes; There is the risk that even after surgery, the bunion will reoccur 9-10% of the time.   Most bunion pain can be improved simply by wearing compatible shoes. People with bunions cannot choose footwear simply because they like the style. Your bunion should determine which shoes are to be worn. Wide shoes with nonirritating seams,soft leather and a square toe box are most compatible with a bunion. Shoes should fit appropriately right out of the box but may need to be professionally stretched and modified to accommodate the bump. Heels, dress shoes and shoes with pointed toes will not be comfortable.   Shoe inserts or orthotics will often times help with bunion pain, however inserts make shoe fit more challenging. Pads placed over the bunion can also help relieve the pain. Injection may help with nerve irritation.   Bunion surgery involves cutting and repositioning the " bones surrounding the bunion. Pins and screws are used to hold the bones in place during the healing process. The goal of bunion surgery is to reduce the size of the bunion bump. Realignment of the toe and joint is attempted.     Some first toes cannot be forced back into normal alignment even with surgery. Surgery is helpful in most cases but does not necessarily create a normal foot.   Healing after surgery requires about six weeks of protection. This allows the bone to heal. Maximum recovery takes about one year. The scar tissue and joint structures require this amount of time to finish the healing process. Expect stiffness, swelling and numbness during that time frame. Bunion surgery does involve side effects. Some side effects are predictable and others are less common but do occur. A scar will be visible and could be irritated by shoes. The shoe may rub on the screw or internal pin requiring surgical removal of these fixation devices. The screw and pin would likely be left in place for a full year. The first toe may loose motion after bunion surgery. The amount of stiffness is variable. Some people never regain normal motion of the first toe. This is due to scar tissue inherent to any surgery. The first toe may drift toward the second toe or away from the second toe. Spreading of the first and second toes is a rare occurrence after bunion surgery. This can be quite bothersome and would need to be surgically repaired. Toe drift toward the second toe could result in a recurrent bunion and revision surgery. Joint fusion is one option to correct an unstable, drifting toe. This procedure straightens the toe, however, no motion remains. Fusion may be necessary to correct complications of bunion surgery or as the original procedure in severe cases.   All surgical procedures involve risk of infection, numbness, pain, delayed healing, osteotomy dislocation, blood clots, continued foot pain, etc. Bunion surgery is quite  complex and should not be taken lightly.   Any skin incision can lead to infection. Deep infection might involve the bone and thus repeat surgery and six weeks of IV antibiotics. Scar tissue can cause nerve pain or numbness. This is generally temporary but can be permanent. We do not have treatments that cure nerve problems. Second toe pain could be related to altered mechanics and pressure transferred to the second toe. Most feet with bunions have pre-existing second toe problems. Delayed bone healing would lengthen the healing time. Some bones simply do not heal. This requires repeat surgery, electronic bone stimulation and/or extended protection. Smokers have an approximate 20% chance of poor bone healing. This is double that of a non-smoker. The bone cut may displace. This may need to be repaired with a second operation. Displacement can cause joint malalignment. Immobility after surgery can cause blood clots in the legs and lungs. This could result in death.   Foot pain is complex. Most feet hurt for more than one reason. Fixing the bunion would not necessarily create a pain free foot. Appropriate shoes, healthy body weight, avoidance of bare foot walking and moderation of activity will always be necessary to enjoy foot comfort. Your bunion may involve arthritis, which is incurable even with surgery. Long standing bunions often involve chronic irritation to the surrounding nerves. Nerve pain may not resolve even with reducing the bunion bump since permanent nerve damage may be present   Bunion surgery is nevertheless quite successful. Most surgical patients are pleased with their foot following bunion surgery. Many of the issues described above can be controlled by taking proper care of your foot during the healing process.   Cosmetic bunion surgery is discouraged for the reasons listed above. A bunion that is comfortable when wearing appropriate shoes should simply be treated with appropriate shoes.   Your  "surgeon would be happy to fully describe any of the above issues. You should pursue a full understanding of the operation,recovery process and any potential problems that could develop.   Prevention of \"Bunions\"    1.  Do not wear high heels if there is a family history of bunions.   2.  Wear shoes that have enough width and depth in the toe box.  Use a motion control arch support if you over-pronate (foot rolls in)       "

## 2017-07-05 NOTE — MR AVS SNAPSHOT
"              After Visit Summary   7/5/2017    Ghada Magana    MRN: [id number]           Patient Information     Date Of Birth          1935        Visit Information        Provider Department      7/5/2017 10:00 AM Shashi Walls DPM [facility]        Today's Diagnoses     Bunion    -  1      Care Instructions    Bunion (hallux abducto valgus)   A bunion is caused by muscle imbalance. The great toe is pulled toward the smaller toes. The metatarsal head is pushed outward creating a lump on the side of your foot. Imbalance is the result of foot structure and instability.   Bunions do not improve with time. They usually enlarge, however this is a fairly slow process. Shoes do not necessarily cause bunions, however, they can hasten development and definitely cause bunions to hurt.   Bunions often run in families. We inherit a certain foot structure, which may be predisposed to bunion development.   Bunion pain is likely a combination of shoes rubbing on the bump, nerve irritation, compression between the toes, joint misalignment, arthritis and altered gait.   Signs & Symptoms of \"Bunions\"   Bunions are usually termed mild, moderate or severe. Just because you have a bunion does not mean you have to have pain. There are some people with very severe bunions and no pain and people with mild bunions and a lot of pain.    1.  Pain on the inside of your foot at the big toe joint (1st MTPJ)    2.  Swelling on the inside of your foot at the big toe joint    3.  Redness on the inside of your foot at the big toe joint    4.  Numbness or burning in the big toe (hallux)    5.  Decreased motion at the big toe joint    6.  Painful bursa (fluid-filled sac) on the inside of your foot at the big toe joint    7.  Pain while wearing shoes -especially shoes too narrow or with high heels     8.  Pain during activities    9.  Corn in between the big toe and second toe    10.  Callous formation on the side " "or bottom of the big toe or big toe joint    11.  Callous under the second toe joint (2nd MTPJ)    12.  Pain in the second toe joint   Treatment for \"Bunions\"   Conservative (non-surgical) treatment will not make the bunion go away, but it will hopefully decrease the signs and symptoms you have and help you get rid of the pain and get you back to your activities.    1.  Wider shoes    2.  Extra depth shoes    3.  Stretch shoes (where bunion is) Cut an \"x\" or cross in the shoe (where bunion is)    4.  Ice    5.  Padding, splints, toe spacers    6.  NSAIDs    7.  Arch supports    8.  Custom orthoses (orthotics)    9.  Change activities    10.  Physical therapy     11.  Surgical treatment for bunions is sometimes needed. If you are limited by pain, cannot fit in shoes comfortably and are not able to do your daily activities then surgery may be a good option for you. There are many different surgical procedures to repair bunions. Your foot doctor (podiatrist) will review your foot exam findings, your x-rays, your age, your health, your lifestyle, your physical activity level and discuss with you which procedure he or she would recommend. Surgical procedures for bunions range from soft tissue repair to cutting and realigning the bones. It is not recommended that you have bunion surgery for cosmetic reasons (you do not like how your foot looks) or because you want to fit in a certain pair of shoes; There is the risk that even after surgery, the bunion will reoccur 9-10% of the time.   Most bunion pain can be improved simply by wearing compatible shoes. People with bunions cannot choose footwear simply because they like the style. Your bunion should determine which shoes are to be worn. Wide shoes with nonirritating seams,soft leather and a square toe box are most compatible with a bunion. Shoes should fit appropriately right out of the box but may need to be professionally stretched and modified to accommodate the bump. " Heels, dress shoes and shoes with pointed toes will not be comfortable.   Shoe inserts or orthotics will often times help with bunion pain, however inserts make shoe fit more challenging. Pads placed over the bunion can also help relieve the pain. Injection may help with nerve irritation.   Bunion surgery involves cutting and repositioning the bones surrounding the bunion. Pins and screws are used to hold the bones in place during the healing process. The goal of bunion surgery is to reduce the size of the bunion bump. Realignment of the toe and joint is attempted.     Some first toes cannot be forced back into normal alignment even with surgery. Surgery is helpful in most cases but does not necessarily create a normal foot.   Healing after surgery requires about six weeks of protection. This allows the bone to heal. Maximum recovery takes about one year. The scar tissue and joint structures require this amount of time to finish the healing process. Expect stiffness, swelling and numbness during that time frame. Bunion surgery does involve side effects. Some side effects are predictable and others are less common but do occur. A scar will be visible and could be irritated by shoes. The shoe may rub on the screw or internal pin requiring surgical removal of these fixation devices. The screw and pin would likely be left in place for a full year. The first toe may loose motion after bunion surgery. The amount of stiffness is variable. Some people never regain normal motion of the first toe. This is due to scar tissue inherent to any surgery. The first toe may drift toward the second toe or away from the second toe. Spreading of the first and second toes is a rare occurrence after bunion surgery. This can be quite bothersome and would need to be surgically repaired. Toe drift toward the second toe could result in a recurrent bunion and revision surgery. Joint fusion is one option to correct an unstable, drifting toe.  This procedure straightens the toe, however, no motion remains. Fusion may be necessary to correct complications of bunion surgery or as the original procedure in severe cases.   All surgical procedures involve risk of infection, numbness, pain, delayed healing, osteotomy dislocation, blood clots, continued foot pain, etc. Bunion surgery is quite complex and should not be taken lightly.   Any skin incision can lead to infection. Deep infection might involve the bone and thus repeat surgery and six weeks of IV antibiotics. Scar tissue can cause nerve pain or numbness. This is generally temporary but can be permanent. We do not have treatments that cure nerve problems. Second toe pain could be related to altered mechanics and pressure transferred to the second toe. Most feet with bunions have pre-existing second toe problems. Delayed bone healing would lengthen the healing time. Some bones simply do not heal. This requires repeat surgery, electronic bone stimulation and/or extended protection. Smokers have an approximate 20% chance of poor bone healing. This is double that of a non-smoker. The bone cut may displace. This may need to be repaired with a second operation. Displacement can cause joint malalignment. Immobility after surgery can cause blood clots in the legs and lungs. This could result in death.   Foot pain is complex. Most feet hurt for more than one reason. Fixing the bunion would not necessarily create a pain free foot. Appropriate shoes, healthy body weight, avoidance of bare foot walking and moderation of activity will always be necessary to enjoy foot comfort. Your bunion may involve arthritis, which is incurable even with surgery. Long standing bunions often involve chronic irritation to the surrounding nerves. Nerve pain may not resolve even with reducing the bunion bump since permanent nerve damage may be present   Bunion surgery is nevertheless quite successful. Most surgical patients are pleased  "with their foot following bunion surgery. Many of the issues described above can be controlled by taking proper care of your foot during the healing process.   Cosmetic bunion surgery is discouraged for the reasons listed above. A bunion that is comfortable when wearing appropriate shoes should simply be treated with appropriate shoes.   Your surgeon would be happy to fully describe any of the above issues. You should pursue a full understanding of the operation,recovery process and any potential problems that could develop.   Prevention of \"Bunions\"    1.  Do not wear high heels if there is a family history of bunions.   2.  Wear shoes that have enough width and depth in the toe box.  Use a motion control arch support if you over-pronate (foot rolls in)               Follow-ups after your visit        Follow-up notes from your care team     Return if symptoms worsen or fail to improve.      Your next 10 appointments already scheduled     Jul 26, 2017  9:00 AM CDT   New Visit with Henrry De Anda MD   [facility] ([facility])    [address] [place] [id number] [phone]              Who to contact     If you have questions or need follow up information about today's clinic visit or your schedule please contact [facility] directly at [phone].  Normal or non-critical lab and imaging results will be communicated to you by MyChart, letter or phone within 4 business days after the clinic has received the results. If you do not hear from us within 7 days, please contact the clinic through MyChart or phone. If you have a critical or abnormal lab result, we will notify you by phone as soon as possible.  Submit refill requests through [facility] or call your pharmacy and they will forward the refill request to us. Please allow 3 business days for your refill to be completed.          Additional Information About Your Visit      " "  MyChart Information     [facility] lets you send messages to your doctor, view your test results, renew your prescriptions, schedule appointments and more. To sign up, go to [email or web address] . Click on \"Log in\" on the left side of the screen, which will take you to the Welcome page. Then click on \"Sign up Now\" on the right side of the page.     You will be asked to enter the access code listed below, as well as some personal information. Please follow the directions to create your username and password.     Your access code is: [email or web address]  Expires: 2017  6:30 AM     Your access code will  in 90 days. If you need help or a new code, please call your [place] clinic or [phone].        Care EveryWhere ID     This is your [facility] EveryWhere ID. This could be used by other organizations to access your [place] medical records  [id number]        Your Vitals Were     Pulse Temperature Height BMI (Body Mass Index)          80 98.5  F (36.9  C) (Oral) 5' 3.5\" (1.613 m) 19.88 kg/m2         Blood Pressure from Last 3 Encounters:   17 122/56   17 128/68   17 148/70    Weight from Last 3 Encounters:   17 114 lb (51.7 kg)   17 116 lb 4 oz (52.7 kg)   17 119 lb (54 kg)              Today, you had the following     No orders found for display       Primary Care Provider Office Phone # Fax #    Chip Barlow PA-C [phone]       [address] [address]        Equal Access to Services     MARLINE OLIVER AH: Zoila Ambrose, waricardoda luqdeborah, qadavidta kaalmada kelsea, julieta shelton. So [facility] [phone].    ATENCIÓN: Si habla español, tiene a dahl disposición servicios gratuitos de asistencia lingüística. Llame al [phone].    We comply with applicable federal civil rights laws and [place] laws. We do not discriminate on the basis of race, color, national origin, " age, disability sex, sexual orientation or gender identity.            Thank you!     Thank you for choosing [facility]  for your care. Our goal is always to provide you with excellent care. Hearing back from our patients is one way we can continue to improve our services. Please take a few minutes to complete the written survey that you may receive in the mail after your visit with us. Thank you!             Your Updated Medication List - Protect others around you: Learn how to safely use, store and throw away your medicines at www.disposemymeds.org.          This list is accurate as of: 7/5/17 10:22 AM.  Always use your most recent med list.                   Brand Name Dispense Instructions for use Diagnosis    amLODIPine 5 MG tablet    NORVASC    90 tablet    Take 1 tablet (5 mg) by mouth daily    Hypertension goal BP (blood pressure) < 140/90       aspirin 81 MG tablet     100    1 tab po QD (Once per day)        CALCIUM 600 + D 600-200 MG-UNIT Tabs     0    2 per day.        carvedilol 25 MG tablet    COREG    180 tablet    Take 1 tablet (25 mg) by mouth 2 times daily (with meals)    Hypertension goal BP (blood pressure) < 140/90       losartan 100 MG tablet    COZAAR    90 tablet    TAKE ONE TABLET BY MOUTH ONE TIME DAILY    Hypertension goal BP (blood pressure) < 140/90       Multi-vitamin Tabs tablet   Generic drug:  multivitamin, therapeutic with minerals      take one daily        TYLENOL 325 MG tablet   Generic drug:  acetaminophen      2 TABLETS EVERY 4 HOURS AS NEEDED        UNKNOWN MED DOSAGE      timolol eye gtts one drop in each eye twice daily

## 2017-07-05 NOTE — PROGRESS NOTES
PATIENT HISTORY:  Ghada Magana is a 82 year old female who presents to clinic for right foot bunion.  2 years of worsening pain.  Softer shoes help.  Worse in tight shoes.  She uses a toe spacer w/cotton which can help.  No injury.  2-10/10 pain.  I was requested to see this patient for this issue by Jacinto Barlow.    Review of Systems:  Patient denies fever, chills, rash, wound, stiffness, limping, numbness, weakness, heart burn, blood in stool, chest pain with activity, calf pain when walking, shortness of breath with activity, chronic cough, easy bleeding/bruising, swelling of ankles, excessive thirst, fatigue, depression, anxiety.       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     Unspecified essential hypertension         PAST SURGICAL HISTORY:   Past Surgical History:   Procedure Laterality Date     SURGICAL HISTORY OF -       s/p bronchoscopy         MEDICATIONS:   Current Outpatient Prescriptions:      losartan (COZAAR) 100 MG tablet, TAKE ONE TABLET BY MOUTH ONE TIME DAILY , Disp: 90 tablet, Rfl: 0     amLODIPine (NORVASC) 5 MG tablet, Take 1 tablet (5 mg) by mouth daily, Disp: 90 tablet, Rfl: 1     carvedilol (COREG) 25 MG tablet, Take 1 tablet (25 mg) by mouth 2 times daily (with meals), Disp: 180 tablet, Rfl: 1     TYLENOL 325 MG PO TABS, 2 TABLETS EVERY 4 HOURS AS NEEDED, Disp: , Rfl:      CALCIUM 600 + D 600-200 MG-UNIT OR TABS, 2 per day. , Disp: 0, Rfl: 0     UNKNOWN MED DOSAGE, timolol eye gtts one drop in each eye twice daily, Disp: , Rfl:      MULTI-VITAMIN OR TABS, take one daily, Disp: , Rfl: 0     ASPIRIN 81 MG OR TABS, 1 tab po QD (Once per day), Disp: 100, Rfl: 3     ALLERGIES:    Allergies   Allergen Reactions     Ace Inhibitors      Cough     Hctz      Increased nightime  "urination        SOCIAL HISTORY:   Social History     Social History     Marital status:      Spouse name: jeanette pepe     Number of children: 4     Years of education: N/A     Occupational History      Retired     Social History Main Topics     Smoking status: Never Smoker     Smokeless tobacco: Never Used     Alcohol use Yes      Comment: occasionally     Drug use: No     Sexual activity: Yes     Partners: Male     Other Topics Concern     Parent/Sibling W/ Cabg, Mi Or Angioplasty Before 65f 55m? No     Social History Narrative        FAMILY HISTORY:   Family History   Problem Relation Age of Onset     CANCER Daughter      ovarian cancer     CEREBROVASCULAR DISEASE Father      Hypertension Father         EXAM:Vitals: /56 (Cuff Size: Adult Regular)  Pulse 80  Temp 98.5  F (36.9  C) (Oral)  Ht 5' 3.5\" (1.613 m)  Wt 114 lb (51.7 kg)  BMI 19.88 kg/m2  BMI= Body mass index is 19.88 kg/(m^2).    General appearance: Patient is alert and fully cooperative with history & exam.  No sign of distress is noted during the visit.     Psychiatric: Affect is pleasant & appropriate.  Patient appears motivated to improve health.     Respiratory: Breathing is regular & unlabored while sitting.     HEENT: Hearing is intact to spoken word.  Speech is clear.  No gross evidence of visual impairment that would impact ambulation.     Dermatologic: Skin is intact to the right foot without significant lesions, rash or abrasion.  No paronychia or evidence of soft tissue infection is noted.     Vascular: DP & PT pulses are 1/4 on the right.  No significant edema.  Mild varicosities noted.  CFT and skin temperature are normal.     Neurologic: Lower extremity sensation is intact to light touch.  No evidence of weakness or contracture in the lower extremities.  No evidence of neuropathy.     Musculoskeletal: Hallux abducto valgus is noted with right foot exam. Lateral deviation of the first toe is appreciated. A bump is noted " on the medial aspect of the first metatarsal head.  1st toe abuts the 2nd, which has some mild skin irritation medially. Evidence of soft tissue irritation is noted over the dorsal medial aspect of the first metatarsal head. No significant sesamoid pain is noted plantarly. I do not appreciate significant pain with joint motion.     XRs deferred today.    ASSESSMENT: R foot bunion     PLAN:  Reviewed patient's chart in epic.  Discussed condition and treatment options including pros and cons.    We discussed treatment alternatives regarding bunion pain and deformity.  I explained that bunion surgery is mostly to reduce the size of the medial bump although an attempt is made to improve the first toe alignment.  Bunion deformity is usually progressive.    Non-operative treatments were discussed including wide fitting shoes, splints, pads and orthotics.  Wide fitting shoes are likely the most useful non-surgical treatment.  I would not expect much improvement from NSAIDs, injection or bunion night splints.     Potential complications from surgery were discussed.      Patient is aware that surgery is elective, can be avoided if desired.  The recovery process was discussed including impact to work, walking, shoes and daily activities.  I would anticipate up to 12 months for maximum recovery after surgery.    Pt elected to try wider shoes for now.  Anant card and handout given.  F/u as needed.      Shashi Walls DPM, FACFAS

## 2017-07-17 ENCOUNTER — TRANSFERRED RECORDS (OUTPATIENT)
Dept: HEALTH INFORMATION MANAGEMENT | Facility: CLINIC | Age: 82
End: 2017-07-17

## 2017-07-19 ENCOUNTER — TRANSFERRED RECORDS (OUTPATIENT)
Dept: HEALTH INFORMATION MANAGEMENT | Facility: CLINIC | Age: 82
End: 2017-07-19

## 2017-07-26 ENCOUNTER — OFFICE VISIT (OUTPATIENT)
Dept: NEUROLOGY | Facility: CLINIC | Age: 82
End: 2017-07-26
Payer: MEDICARE

## 2017-07-26 VITALS
OXYGEN SATURATION: 97 % | HEART RATE: 95 BPM | DIASTOLIC BLOOD PRESSURE: 54 MMHG | BODY MASS INDEX: 19.29 KG/M2 | WEIGHT: 113 LBS | HEIGHT: 64 IN | RESPIRATION RATE: 16 BRPM | SYSTOLIC BLOOD PRESSURE: 96 MMHG

## 2017-07-26 DIAGNOSIS — R41.3 MEMORY LOSS: ICD-10-CM

## 2017-07-26 DIAGNOSIS — E51.9 THIAMIN DEFICIENCY: Primary | ICD-10-CM

## 2017-07-26 DIAGNOSIS — R41.3 MEMORY LOSS: Primary | ICD-10-CM

## 2017-07-26 PROCEDURE — 84425 ASSAY OF VITAMIN B-1: CPT | Mod: 90 | Performed by: PSYCHIATRY & NEUROLOGY

## 2017-07-26 PROCEDURE — 99000 SPECIMEN HANDLING OFFICE-LAB: CPT | Performed by: PSYCHIATRY & NEUROLOGY

## 2017-07-26 PROCEDURE — 36415 COLL VENOUS BLD VENIPUNCTURE: CPT | Performed by: PSYCHIATRY & NEUROLOGY

## 2017-07-26 PROCEDURE — 99205 OFFICE O/P NEW HI 60 MIN: CPT | Performed by: PSYCHIATRY & NEUROLOGY

## 2017-07-26 ASSESSMENT — MONTREAL COGNITIVE ASSESSMENT (MOCA)
VISUOSPATIAL/EXECUTIVE SUBSCORE: 2
WHAT LEVEL OF EDUCATION WAS ATTAINED: 1
11. FOR EACH PAIR OF WORDS, WHAT CATEGORY DO THEY BELONG TO (OUT OF 2): 0
WHAT IS THE TOTAL SCORE (OUT OF 30): 15
9. REPEAT EACH SENTENCE: 0
12. MEMORY INDEX SCORE: 0
7. [VIGILENCE] TAP WHEN HEARING DESIGNATED LETTER: 1
8. SERIAL SUBTRACTION OF 7S: 2
13. ORIENTATION SUBSCORE: 4
6. READ LIST OF DIGITS [FORWARD/BACKWARD]: 2
4. NAME EACH OF THE THREE ANIMALS SHOWN: 3
10. [FLUENCY] NAME WORDS STARTING WITH DESIGNATED LETTER: 0

## 2017-07-26 NOTE — MR AVS SNAPSHOT
After Visit Summary   7/26/2017    Ghada Magana    MRN: [id number]           Patient Information     Date Of Birth          1935        Visit Information        Provider Department      7/26/2017 9:00 AM Henrry De Anda MD [facility]        Today's Diagnoses     Memory loss    -  1      Care Instructions    AFTER VISIT SUMMARY (AVS):    At today's visit we discussed various diagnostic possibilities for your symptoms and the reasons for work-up, which includes:  Orders Placed This Encounter   Procedures     Vitamin B1 whole blood     NEUROPSYCHOLOGY REFERRAL     OCCUPATIONAL THERAPY REFERRAL     No new medications were ordered. Treatment options will be discussed at the next visit.    Preventive Neurology: Encouraged to stay physically and mentally active with particular emphasis on daily mentally stimulating activities of your choice (such as crosswords, puzzles, sudoku, etc.), stretching exercises, walking, and healthy eating.    Additional recommendations after the work-up.    Next follow-up appointment is in next 2-3 months or earlier if needed.    Please do not hesitate to call me with any questions or concerns.    Thanks.            Follow-ups after your visit        Additional Services     NEUROPSYCHOLOGY REFERRAL       Your provider has referred you to:    [facility]: Adult Neuropsychology Clinic (Mental Health Services) - [place] [phone]   [email or web address]    All scheduling is subject to the client's specific insurance plan & benefits, provider/location availability, and provider clinical specialities.  Please arrive 15 minutes early for your first appointment and bring your completed paperwork.    Please be aware that coverage of these services is subject to the terms and limitations of your health insurance plan.  Call member services at your health plan with any benefit or coverage  questions.    Please bring the following to your appointment:  >>   Any x-rays, CTs or MRIs which have been performed.  Contact the facility where they were done to arrange for  prior to your scheduled appointment.  Any new CT, MRI or other procedures ordered by your specialist must be performed at a [facility] facility or coordinated by your clinic's referral office.    >>   List of current medications   >>   This referral request   >>   Any documents/labs given to you for this referral            OCCUPATIONAL THERAPY REFERRAL       *This order will print in the [facility] Central Scheduling Office*    [facility] provides Occupational Therapy evaluation and treatment and many specialty services across the [facility] system.  If requesting a specialty program, please choose from the list below.    Call [phone] to schedule [facility] at all locations, with the exception of [facility], please call [phone].     Treatment: Evaluation & Treatment  Special Instructions/Modalities: CPT, eval and treat  Special Programs: Cognition Assessment     Please be aware that coverage of these services is subject to the terms and limitations of your health insurance plan.  Call member services at your health plan with any benefit or coverage questions.      **Note to Provider** To refer patients to therapy outside of the location list, change the order class to External Referral in the order composer.                  Follow-up notes from your care team     Return in about 3 months (around 10/26/2017).      Your next 10 appointments already scheduled     Oct 11, 2017  9:00 AM CDT   Return Visit with Henrry De Anda MD   [facility] ([facility])    [address] [place] [id number] [phone]              Who to contact     If you have questions or need follow up information about  "today's clinic visit or your schedule please contact [facility] directly at [phone].  Normal or non-critical lab and imaging results will be communicated to you by [employer], letter or phone within 4 business days after the clinic has received the results. If you do not hear from us within 7 days, please contact the clinic through [employer] or phone. If you have a critical or abnormal lab result, we will notify you by phone as soon as possible.  Submit refill requests through [employer] or call your pharmacy and they will forward the refill request to us. Please allow 3 business days for your refill to be completed.          Additional Information About Your Visit        [employer] Information     [employer] lets you send messages to your doctor, view your test results, renew your prescriptions, schedule appointments and more. To sign up, go to [email or web address] . Click on \"Log in\" on the left side of the screen, which will take you to the Welcome page. Then click on \"Sign up Now\" on the right side of the page.     You will be asked to enter the access code listed below, as well as some personal information. Please follow the directions to create your username and password.     Your access code is: [email or web address]  Expires: 2017  6:30 AM     Your access code will  in 90 days. If you need help or a new code, please call your [facility] clinic or [phone].        Care EveryWhere ID     This is your Care EveryWhere ID. This could be used by other organizations to access your [facility] medical records  [id number]        Your Vitals Were     Pulse Respirations Height Pulse Oximetry BMI (Body Mass Index)       95 16 1.613 m (5' 3.5\") 97% 19.7 kg/m2        Blood Pressure from Last 3 Encounters:   17 96/54   17 122/56   17 128/68    Weight from Last 3 Encounters:   17 51.3 kg (113 lb)   17 51.7 kg (114 lb)   17 52.7 kg (116 lb 4 oz)              We Performed the " Following     NEUROPSYCHOLOGY REFERRAL     OCCUPATIONAL THERAPY REFERRAL     Vitamin B1 whole blood        Primary Care Provider Office Phone # Fax #    Chip Barlow PA-C [phone]       [address] [address]        Equal Access to Services     MARLINE OLIVER : Hadii ron ku hadasho Soomaali, waaxda luqadaha, qaybta kaalmada adeegyada, waxay pain hayaan ademark tavarezmanuelariana shelton. So [facility] [phone].    ATENCIÓN: Si habla español, tiene a dahl disposición servicios gratuitos de asistencia lingüística. Tricia al [phone].    We comply with applicable federal civil rights laws and [place] laws. We do not discriminate on the basis of race, color, national origin, age, disability sex, sexual orientation or gender identity.            Thank you!     Thank you for choosing [facility]  for your care. Our goal is always to provide you with excellent care. Hearing back from our patients is one way we can continue to improve our services. Please take a few minutes to complete the written survey that you may receive in the mail after your visit with us. Thank you!             Your Updated Medication List - Protect others around you: Learn how to safely use, store and throw away your medicines at www.disposemymeds.org.          This list is accurate as of: 7/26/17 10:06 AM.  Always use your most recent med list.                   Brand Name Dispense Instructions for use Diagnosis    amLODIPine 5 MG tablet    NORVASC    90 tablet    Take 1 tablet (5 mg) by mouth daily    Hypertension goal BP (blood pressure) < 140/90       aspirin 81 MG tablet     100    1 tab po QD (Once per day)        CALCIUM 600 + D 600-200 MG-UNIT Tabs     0    2 per day.        carvedilol 25 MG tablet    COREG    180 tablet    Take 1 tablet (25 mg) by mouth 2 times daily (with meals)    Hypertension goal BP (blood pressure) < 140/90       losartan 100 MG tablet     COZAAR    90 tablet    TAKE ONE TABLET BY MOUTH ONE TIME DAILY    Hypertension goal BP (blood pressure) < 140/90       Multi-vitamin Tabs tablet   Generic drug:  multivitamin, therapeutic with minerals      take one daily        TYLENOL 325 MG tablet   Generic drug:  acetaminophen      2 TABLETS EVERY 4 HOURS AS NEEDED        UNKNOWN MED DOSAGE      timolol eye gtts one drop in each eye twice daily

## 2017-07-26 NOTE — NURSING NOTE
"Chief Complaint   Patient presents with     Consult     memory issues       Initial BP 96/54 (BP Location: Left arm, Patient Position: Chair, Cuff Size: Adult Regular)  Pulse 95  Resp 16  Ht 1.613 m (5' 3.5\")  Wt 51.3 kg (113 lb)  SpO2 97%  BMI 19.7 kg/m2 Estimated body mass index is 19.7 kg/(m^2) as calculated from the following:    Height as of this encounter: 1.613 m (5' 3.5\").    Weight as of this encounter: 51.3 kg (113 lb).  Medication Reconciliation: unable or not appropriate to perform - Patient does not know what medications she is taking.    Syeda Lawrence, [facility]      "

## 2017-07-26 NOTE — PROGRESS NOTES
INITIAL NEUROLOGY CONSULTATION    DATE OF VISIT: 7/26/2017  CLINIC LOCATION: [facility]– [place] 2[place]  MRN: [id number]  PATIENT NAME: Ghada Magana  YOB: 1935    PRIMARY CARE PROVIDER: MERON EASON PA-C.    REASON FOR VISIT:   Chief Complaint   Patient presents with     Consult     memory issues     HISTORY OF PRESENT ILLNESS:                                                    Ms. Ghada Magana is 82 year old right handed female patient with past medical history of hypertension, congenital lung anomaly, hyperlipidemia, anemia, and osteoporosis, who was seen in consultation today requested by MERON EASON PA-C, for second opinion regarding her gradual memory loss. She was previously seen by Dr. Ordonez at the [facility] neurology clinic on 06/08/2017. The patient is accompanied by her , who participates in the interview.    Prior records indicate that her neurological exam in the Dr. Ordonez's office did not show any focal neurological findings. MoCA was 15/30. Performed evaluation included head CT (06/08/2017), which was negative for acute intracranial pathology. Her CBC and TSH were normal. CMP was unremarkable except glucose of 155. B12 was checked in 2016 and was 1352. SLUMS in January of 2017 was 20/30.    Per patient's report, she has no idea why she came today. She was told that she couldn't do certain things anymore. Upon direct questioning she admits that her memory not as good as before.    According to her , the patient developed gradual memory decline over the last 2 years. It is getting progressively worse. The patient is repeating the same stories and asking the same questions over again. She forgets important dates and recent events. She has word finding difficulty. She misplaces her items, such as keys or glasses. Sometimes, her  has hard time finding things at home because of this. She used to be good taking her medications on time. More  recently, the  found that she forgot to take one of her blood pressure medications, and the pill bottle was just standing on the table. He had to go to the pharmacy to check and was told that it is one of her prescribed blood pressure medications.    Her  is also concerned that she might get lost in the store when they go shopping. She does not get lost in the familiar neighborhood or at her own house.    He has no safety concerns. He never noted that she left water faucets running, stove burners or lights on, or keys outside the door. She quit driving 1 month ago per Dr. Ordonez advice.    Her  is responsible for the finances. He also cooks most of the time.    Per  observation, patient became more irritable. Otherwise, her personality and mood have not changed greatly.    The patient denies any focal neurological symptoms, including changes in her vision, swallowing, or language, diplopia, focal numbness, tingling, or weakness.    She denies a history of recent head injury. Prior neurological history: negative for migraine headaches, stroke, brain neoplasms, seizure disorders, multiple sclerosis, major head injuries, and CNS infections.    On the review of systems, she endorses recent weight loss, urinary urgency, arthritis, and chronic cough. All of these complaints were already addressed by her primary care provider. Otherwise, she denies any other complaints on 14-point comprehensive review of systems.    PAST MEDICAL/SURGICAL HISTORY:                                                    I personally reviewed patient's past medical and surgical history with the patient at today's visit.  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  Current Outpatient Prescriptions on File Prior to Visit:  losartan (COZAAR) 100 MG tablet TAKE ONE TABLET BY MOUTH ONE TIME DAILY    amLODIPine (NORVASC) 5 MG tablet Take 1 tablet (5 mg) by mouth daily   carvedilol (COREG) 25 MG tablet Take 1 tablet (25 mg) by mouth 2 times daily (with meals)   TYLENOL 325 MG PO TABS 2 TABLETS EVERY 4 HOURS AS NEEDED   CALCIUM 600 + D 600-200 MG-UNIT OR TABS 2 per day.    UNKNOWN MED DOSAGE timolol eye gtts one drop in each eye twice daily   MULTI-VITAMIN OR TABS take one daily   ASPIRIN 81 MG OR TABS 1 tab po QD (Once per day)     ALLERGIES:                                                      Allergies   Allergen Reactions     Ace Inhibitors      Cough     Hctz      Increased nightime urination     FAMILY/SOCIAL HISTORY:                                                    Family and social history was reviewed with the patient at today's visit. Father had history of stroke. No other family history of neurological disorders.   Problem (# of Occurrences) Relation (Name,Age of Onset)    CANCER (1) Daughter: ovarian cancer    CEREBROVASCULAR DISEASE (1) Father    Hypertension (1) Father        , lives with , has several children. Retired. Never smoker, denies recreational drug use. Uses alcohol occasionally (1 glass of wine 3 times per week).  Social History   Substance Use Topics     Smoking status: Never Smoker     Smokeless tobacco: Never Used     Alcohol use Yes      Comment: occasionally     REVIEW OF SYSTEMS:                                                    Patient has completed a [facility] Patient Health History, including a 14-system review which was personally reviewed, and pertinent positives are listed in HPI.  "She denies any additional problems on the further questioning.    EXAM:                                                    VITAL SIGNS:   BP 96/54 (BP Location: Left arm, Patient Position: Chair, Cuff Size: Adult Regular)  Pulse 95  Resp 16  Ht 1.613 m (5' 3.5\")  Wt 51.3 kg (113 lb)  SpO2 97%  BMI 19.7 kg/m2    [place] Cognitive Assessment:    Visuospatial/Executive : 2  Naming: 3  Attention - Digits: 2  Attention - Letters: 1  Attention - Subtraction: 2  Language - Repeat: 0  Language - Fluency : 0  Abstraction: 0  Delayed Recall: 0  Orientation: 4  Education: 1  MOCA Score: 15  Administered by: : Syeda GOETZ CMA   [place] Cognitive Assessment Score:  15/30    General: pt is in NAD, cooperative.  Skin: normal turgor, moist mucous membranes, no lesions/rashes noticed.  HEENT: ATNC, EOMI, PERRL, white sclera, normal conjunctiva, no nystagmus or ptosis. No carotid bruits bilaterally.  Respiratory: lung sounds clear to auscultation bilaterally, no crackles, wheezes, rhonchi. Symmetric lung excursion, no accessory respiratory muscle use.  Cardiovascular: normal S1/S2, no murmurs/rubs/gallops.  Abdomen: Not distended.  : deferred.    Neurological:  Mental: alert, follows commands, MoCA is 15/30, no aphasia or dysarthria. Fund of knowledge is diminished for age.  Cranial Nerves:  CN II: visual acuity - able to accurately count fingers with each eye. Visual fields intact, fundi: discs sharp, no papilledema and normal vessels bilaterally.  CN III, IV, VI: EOM intact, pupils equal and reactive  CN V: facial sensation nl  CN VII: face symmetric, no facial droop  CN VIII: hearing normal  CN IX: palate elevation symmetric, uvula at midline  CN XI SCM normal, shoulder shrug nl  CN XII: tongue midline  Motor: Strength: 5/5 in all major groups of all extremities. Normal tone. No abnormal movements. No pronator drift b/l.  Reflexes: Triceps, biceps, brachioradialis, patellar, and achilles reflexes normal and symmetric. No " clonus noted. Toes are down-going b/l.   Sensory: temperature, light touch, pinprick, and vibration intact. Romberg: negative.  Coordination: FNF and heel-shin tests intact b/l. No dysdiadochokinesia with rapid alternating movements.  Gait:  Normal, able tandem, toe, heel walk.    DATA:     LABS: I personally reviewed the following labs:  Orders Only on 06/08/2017   Component Date Value Ref Range Status     WBC 06/08/2017 8.5  4.0 - 11.0 10e9/L Final     RBC Count 06/08/2017 3.92  3.8 - 5.2 10e12/L Final     Hemoglobin 06/08/2017 11.9  11.7 - 15.7 g/dL Final     Hematocrit 06/08/2017 36.5  35.0 - 47.0 % Final     MCV 06/08/2017 93  78 - 100 fl Final     MCH 06/08/2017 30.4  26.5 - 33.0 pg Final     MCHC 06/08/2017 32.6  31.5 - 36.5 g/dL Final     RDW 06/08/2017 13.1  10.0 - 15.0 % Final     Platelet Count 06/08/2017 358  150 - 450 10e9/L Final     Sodium 06/08/2017 136  133 - 144 mmol/L Final     Potassium 06/08/2017 4.7  3.4 - 5.3 mmol/L Final     Chloride 06/08/2017 102  94 - 109 mmol/L Final     Carbon Dioxide 06/08/2017 25  20 - 32 mmol/L Final     Anion Gap 06/08/2017 9  3 - 14 mmol/L Final     Glucose 06/08/2017 155* 70 - 99 mg/dL Final     Urea Nitrogen 06/08/2017 26  7 - 30 mg/dL Final     Creatinine 06/08/2017 0.74  0.52 - 1.04 mg/dL Final     GFR Estimate 06/08/2017 75  >60 mL/min/1.7m2 Final    Non  GFR Calc     GFR Estimate If Black 06/08/2017   >60 mL/min/1.7m2 Final                    Value:>90   GFR Calc       Calcium 06/08/2017 9.5  8.5 - 10.1 mg/dL Final     Bilirubin Total 06/08/2017 0.4  0.2 - 1.3 mg/dL Final     Albumin 06/08/2017 4.0  3.4 - 5.0 g/dL Final     Protein Total 06/08/2017 8.7  6.8 - 8.8 g/dL Final     Alkaline Phosphatase 06/08/2017 94  40 - 150 U/L Final     ALT 06/08/2017 21  0 - 50 U/L Final     AST 06/08/2017 22  0 - 45 U/L Final     TSH 06/08/2017 1.81  0.40 - 4.00 mU/L Final     IMAGING: I also personally reviewed following brain/vessel imaging  and agree with the formal radiology reports as follows:   CT HEAD WITHOUT CONTRAST 06/08/2017:  1. No acute intracranial pathology.  2. Chronic with possible superimposed acute sinusitis findings of the right maxillary sinus.  MRI BRAIN WITHOUT AND WITH CONTRAST 07/05/2007 (indication: stroke symptoms): Mild brain atrophy. Minimal white matter ischemic disease in the right frontal lobe. Otherwise normal MRI of the brain and IAC's.  MRA NECK WITH AND WITHOUT CONTRAST 07/05/2007: Normal MR angiogram of the neck. The portion of the distal internal carotid arteries up to the carotid siphons appear normal indicating that the findings on the MRA of the head were artifactual.  MRA HEAD WITHOUT CONTRAST 07/05/2007: Multiple apparent stenoses in the internal carotid arteries. Motion artifacts affect all of the images and make it impossible to determine whether these are real or artifactual.  OTHER STUDIES: I reviewed previous neurology notes as stated in the history of present illness.  ASSESSMENT and PLAN:      ASSESSMENT: Ghada Magana is a 82 year old female patient with past medical history of hypertension, congenital lung anomaly, hyperlipidemia, anemia, and osteoporosis, who presents with progressive memory decline for the last 2 years. Her MoCA is 15/30 today. The neurological exam is otherwise non-focal. Previous brain imaging and laboratory workup (TSH and B12) are unremarkable.    Her clinical presentation is likely consistent with major neurocognitive disorder. The top differential consideration is neurodegenerative condition with Alzheimer's disease being most common. Other less likely considerations include thiamine deficiency. I will check for that today. I also ordered CPT (OT) and neuropsychologic evaluation to further characterize her cognitive dysfunction.    We will discuss available treatment options once her workup is fully completed.    DIAGNOSES:    ICD-10-CM    1. Memory loss R41.3 Vitamin B1  whole blood     NEUROPSYCHOLOGY REFERRAL     OCCUPATIONAL THERAPY REFERRAL     PLAN: At today's visit we thoroughly discussed various diagnostic possibilities for her symptoms and the reasons for work-up, which includes:  Orders Placed This Encounter   Procedures     Vitamin B1 whole blood     NEUROPSYCHOLOGY REFERRAL     OCCUPATIONAL THERAPY REFERRAL     No new medications were ordered. Treatment options will be discussed at the next visit.    Preventive Neurology: Encouraged to stay physically and mentally active with particular emphasis on daily mentally stimulating activities of her choice (such as crosswords, puzzles, sudoku, etc.), stretching exercises, walking, and healthy eating.    Additional recommendations after the work-up.    Next follow-up appointment is in next 2-3 months or earlier if needed.    I advised the patient and her  to call me with any questions or concerns.    Total Time:  60 minutes with > 50% spent counseling the patient and her  on stated above assessment and recommendations, including nature of the diagnosis, needed w/u, proposed plan of treatment, and prognosis.    Henrry De Anda MD  / Neurology  [place]  (Chart documentation was completed in part with Dragon voice-recognition software. Even though reviewed, some grammatical, spelling, and word errors may remain.)

## 2017-07-26 NOTE — PATIENT INSTRUCTIONS
AFTER VISIT SUMMARY (AVS):    At today's visit we discussed various diagnostic possibilities for your symptoms and the reasons for work-up, which includes:  Orders Placed This Encounter   Procedures     Vitamin B1 whole blood     NEUROPSYCHOLOGY REFERRAL     OCCUPATIONAL THERAPY REFERRAL     No new medications were ordered. Treatment options will be discussed at the next visit.    Preventive Neurology: Encouraged to stay physically and mentally active with particular emphasis on daily mentally stimulating activities of your choice (such as crosswords, puzzles, sudoku, etc.), stretching exercises, walking, and healthy eating.    Additional recommendations after the work-up.    Next follow-up appointment is in next 2-3 months or earlier if needed.    Please do not hesitate to call me with any questions or concerns.    Thanks.

## 2017-07-29 LAB — VIT B1 BLD-MCNC: 67 UG/DL

## 2017-07-31 ENCOUNTER — OFFICE VISIT (OUTPATIENT)
Dept: NEUROPSYCHOLOGY | Facility: CLINIC | Age: 82
End: 2017-07-31

## 2017-07-31 DIAGNOSIS — F33.1 MAJOR DEPRESSIVE DISORDER, RECURRENT EPISODE, MODERATE (H): ICD-10-CM

## 2017-07-31 DIAGNOSIS — F41.9 ANXIETY: ICD-10-CM

## 2017-07-31 DIAGNOSIS — R41.841 COGNITIVE COMMUNICATION DEFICIT: Primary | ICD-10-CM

## 2017-07-31 NOTE — MR AVS SNAPSHOT
After Visit Summary   7/31/2017    Ghada Magana    MRN: [id number]           Patient Information     Date Of Birth          1935        Visit Information        Provider Department      7/31/2017 8:00 AM Mikey Fisher, PhD [facility] Neuropsychology        Today's Diagnoses     Cognitive communication deficit    -  1    Major depressive disorder, recurrent episode, moderate (H)        Anxiety           Follow-ups after your visit        Your next 10 appointments already scheduled     Aug 14, 2017  1:00 PM CDT   Evaluation with Leanne Ramirez OT   [facility] [place], Occupational Therapy - Outpatient ([facility])    [address], [address]  [place] [address]   [phone]            Oct 11, 2017 10:30 AM CDT   Return Visit with Henrry De Anda MD   [facility] ([facility]    [address] [place] [id number] [phone]              Who to contact     Please call your clinic at [phone] to:    Ask questions about your health    Make or cancel appointments    Discuss your medicines    Learn about your test results    Speak to your doctor   If you have compliments or concerns about an experience at your clinic, or if you wish to file a complaint, please contact [facility] Patient Relations at [phone] or email us at [email or web address]         Additional Information About Your Visit        MyChart Information     [employer] is an electronic gateway that provides easy, online access to your medical records. With [employer], you can request a clinic appointment, read your test results, renew a prescription or communicate with your care team.     To sign up for [employer] visit the website at [email or web address]   You will be asked to enter the access code listed below, as well as some personal information. Please follow the  directions to create your username and password.     Your access code is: [email or web address]  Expires: 2017  6:30 AM     Your access code will  in 90 days. If you need help or a new code, please contact your [facility] or call [phone] for assistance.        Care EveryWhere ID     This is your Care EveryWhere ID. This could be used by other organizations to access your [place] medical records  [id number]         Blood Pressure from Last 3 Encounters:   17 96/54   17 122/56   17 128/68    Weight from Last 3 Encounters:   17 51.3 kg (113 lb)   17 51.7 kg (114 lb)   17 52.7 kg (116 lb 4 oz)              We Performed the Following     [id number] TESTING, PER HR/PSYCHOLOGIST     NEUROPSYCH TESTING BY [facility]        Primary Care Provider Office Phone # Fax #    Chip Barlow PA-C [phone]       [address] [address]        Equal Access to Services     MARLINE OLIVER : Hadii aad ku hadasho Soomaali, waaxda luqadaha, qaybta kaalmada adeegyada, julieta mcclain haylizeth palafox . So [facility] [phone].    ATENCIÓN: Si habla español, tiene a dahl disposición servicios gratuitos de asistencia lingüística. [facility] [phone].    We comply with applicable federal civil rights laws and [place] laws. We do not discriminate on the basis of race, color, national origin, age, disability sex, sexual orientation or gender identity.            Thank you!     Thank you for choosing [facility] NEUROPSYCHOLOGY  for your care. Our goal is always to provide you with excellent care. Hearing back from our patients is one way we can continue to improve our services. Please take a few minutes to complete the written survey that you may receive in the mail after your visit with us. Thank you!             Your Updated Medication List - Protect others around you: Learn how to safely use,  store and throw away your medicines at www.disposemymeds.org.          This list is accurate as of: 7/31/17 11:59 PM.  Always use your most recent med list.                   Brand Name Dispense Instructions for use Diagnosis    amLODIPine 5 MG tablet    NORVASC    90 tablet    Take 1 tablet (5 mg) by mouth daily    Hypertension goal BP (blood pressure) < 140/90       aspirin 81 MG tablet     100    1 tab po QD (Once per day)        CALCIUM 600 + D 600-200 MG-UNIT Tabs     0    2 per day.        carvedilol 25 MG tablet    COREG    180 tablet    Take 1 tablet (25 mg) by mouth 2 times daily (with meals)    Hypertension goal BP (blood pressure) < 140/90       losartan 100 MG tablet    COZAAR    90 tablet    TAKE ONE TABLET BY MOUTH ONE TIME DAILY    Hypertension goal BP (blood pressure) < 140/90       Multi-vitamin Tabs tablet   Generic drug:  multivitamin, therapeutic with minerals      take one daily        TYLENOL 325 MG tablet   Generic drug:  acetaminophen      2 TABLETS EVERY 4 HOURS AS NEEDED        UNKNOWN MED DOSAGE      timolol eye gtts one drop in each eye twice daily

## 2017-07-31 NOTE — PROGRESS NOTES
The patient was seen for neuropsychological evaluation at the request of Henrry De Anda for the purposes of diagnostic clarification and treatment planning.  Two hours of face-to-face testing were provided by this writer.  Please see Dr. Mikey Fisher's report for a full interpretation of the findings.

## 2017-08-02 NOTE — PROGRESS NOTES
__ Orientation            Time          __ -0 ____        Place        _____1____        Personal Info.     _____4____    __WAIS-IV   FSIQ____VCI_____PRI_____ [id number]___PSI___     Raw  Age SS  __Similarities  __11__               ___5___  __Vocabulary  _______ _______  __Information  _______          _______  __Comprehension _______ _______  __Block Design  __24___ __10___  __Matrix Reas.  _______ _______  __Visual Puzzles _______ _______  __Picture Comp. _______ _______  __Figure Weights _______ _______  __Digit Span  __ 15__               ___6___  __Arithmetic  ___9___              __8____  __L-N Sequencing _______ _______  __Symbol Search _______ _______  __Coding  __35___              ___9___  __Cancellation  _______ _______          HVLT-R  Trial 1=  3 Trial 2=  7 Trial 3=  8       Total= 18    Immediate Recall Raw= 18  T= 41  Delayed Recall    Raw= 0   T= <20  Retention (%)    Raw= 0    T= <20  Rec.Dis Ind.   # Hits= 9   #FPs=3   T=25           BVMT-R  Trial 1= 0 Trial 2= 4 Trial 3= 4 Total=8    Immediate Recall Raw= 8  T= 29  Delayed Recall    Raw= 3  T = 31  Retention (%)    Raw= 75     %ile = >16  Rec.Dis Ind.   # Hits= 4   #FPs=0   %ile =11-16    __Stephen-Laurencerrjim/Fadia Complex Figure Test    Raw  T-score   %ile  Copy   _17__         -                 ?1  Time               322 __                                 __WRAT-4   Reading SS = 93   %ile = 32    GE = 8.1    __COWAT   Raw__19___ Tscore__34___   __Semantic Fluency/Animals   Raw = 11     T-score = 35  __BNT   Raw = 27 %ile = 3  __Complex Ideational Material   Raw = 9     Tscore = 25    __Trail Making Test   A =   47         Errors = 0    %ile =    B =  146        Errors = 1  %ile = 75      __Stroop                        Word = 57 %ile = 8-17   Color = 50 %ile = 75-92   C/W = 11 %ile = 17-25    __JoLO   Raw = 13 %ile = 19-31    __BDI-II   Raw__24__ Interp. __moderate___  __BAI    Raw__16__     Interp.__moderate___    __WMS-III   LM1 = 19 SS =  7   LM2 = 9  SS = 9   LM2R = 21 Zscore = -0.11

## 2017-08-02 NOTE — PROGRESS NOTES
"Name: Ghada Magana  MR#: [id number]  YOB: 1935  Date of Exam: 07/31/2017    Neuropsychology Laboratory  [facility]  [address]  [place]  [address] [phone]  NEUROPSYCHOLOGICAL EVALUATION    IDENTIFYING INFORMATION  Ghada Magana is an 82 year old, right handed, retired , with 12 years of formal education. She was accompanied to the evaluation by her , Neeraj.    BACKGROUND INFORMATION / INTERVIEW FINDINGS    Records indicate that analilia Magana's medical history includes hypertension, congenital long anomaly, hyperlipidemia, anemia, and osteoporosis. CT imaging of her head on 06/08/2017 documented \"moderate generalized cerebral volume loss. Concerns have been expressed about her cognition, and memory in particular. Alzheimer's disease has been discussed as a possible etiologic consideration. The current evaluation was requested by Dr. Henrry De Anda, in this context.    On interview, Ms. Magana stated that she has noticed rapid changes in her thinking. She described a change in her attention since the beginning of the year. She noted that she sometimes forgets what she has read. The patient's  reported that he has noticed a change in her mood. He did state, however, that there seems to be progressively worsening difficulties with her memory such that she may forget significant events or conversations. He stated that her memory seems to be worse on days that she is irritable. He indicated, however, that there are times when her memory is normal. Regarding mood, the patient reported feeling grouchy. She noted that she sometimes feels depressed and sad. She denied prior treatment or diagnoses for mental health related issues. Her  noted that she is more irritable than she was in the past. The patient denied suicidal ideation.    With respect to other medical background, Ms. Magana denied prior TBI, stroke, or " seizure. Per records, her current medications include amlodipine, aspirin, calcium, carvedilol, losartan, multivitamin, and acetaminophen. She stated that she consumes approximately two alcoholic drinks per week, but denied other substance use.    Ms. Magana lives at home with her . She manages her own basic daily activities and her own medications. Her  has been managing their finances for the last several years. He prepares their meals. She has not been driving since she was instructed to stop approximately 1.5 months ago. By way of background, the patient and her  have been  for nearly 60 years. They have four adult children, all of whom live locally. She graduated from high school with average grades. She worked in the past as a  for the telephone company, at a food [employer], and as a  at [employer]. She is retired.    BEHAVIORAL OBSERVATIONS  Ms. Magana was polite and cooperative with the exam. Her speech was notable for word finding difficulties and mild dysfluency, but was otherwise normal. Comprehension was normal. She also had difficulties with hearing. Thought processes were notable for a few perseverations, mild impulsivity, and poor planning on one measure. Her mood was depressed with markedly flat affect. Her effort was good. The current results are felt to be an accurate portrayal of her cognitive functioning.     RESULTS OF EXAM  Her performances on measures of neuropsychological functioning were as follows:      She was unable to state the name of the clinic, but was otherwise oriented to place. She was fully oriented to time and various aspects of personal information. Performance on a measure single word reading was average. Auditory attention for digits was low average. Mental calculations were average. Learning of words in a list format was low average. Delayed recall of list words was impaired. Percent retention a list words was impaired. Delayed  recognition of list words was impaired, with three false positive errors. It should be noted, however, that she recognized more words than she produced in any of the learning trials. Learning of story information was low average. Delayed recall of this information was average. Delayed recognition of story information was average. Learning of simple geometric figures and their spatial locations was impaired. Delayed recall of these figures and their locations was borderline impaired. Percent retention of the figures was normal. Delayed recognition of the figures was low average. Her drawing of a complicated geometric figure was impaired and it was notable for a piecemeal approach with poor appreciation of the figure s details. Visuospatial judgments for variably oriented lines were performed in the low average to average range. Visual problem-solving with blocks was average. Comprehension of phrases and short stories was impaired. Verbal associative fluency was borderline impaired. Semantic verbal fluency was borderline impaired. Naming to confrontation was borderline impaired. Verbal abstract reasoning was borderline impaired. Speeded visual sequencing under focused attention was performed in the high average to superior range. A similar measure with the divided attention component was high average. Speeded word reading was performed in the borderline impaired to low average range. Speeded color naming was performed in the high average to superior range. Speeded inhibition of an overlearned response was low average. Speeded visuomotor coding was average.    She endorsed items consistent with moderate symptoms of depression, and moderate symptoms of anxiety on self-report measures.    IMPRESSIONS  Ms. Magana demonstrated a pattern of weaknesses that raises some question of bilateral frontal and temporal region dysfunction. The etiology is not certain, but this pattern could be seen as consistent with cerebrovascular  disease. That said, I strongly suspect that psychological factors, namely depression and anxiety, are contributing to her performances in the current exam as well as to her concerns about difficulties in day-to-day life. It is not clear to me the degree to which the weakness seen in the current exam are reflective of brain dysfunction, or whether her cognitive difficulties are attributable to psychological factors. In this exam, weaknesses and variability were identified in aspects of expressive language, some executive abilities, and some aspects of verbal memory. Importantly, however, in many cases she was able to demonstrate entirely normal performances on each of these domains, including memory. I would predict a reduction in her subjective concerns about cognitive dysfunction following improved management of her mental health.    RECOMMENDATIONS  Preliminary results and recommendations were provided to the patient and her  over the telephone on 08/02/2017, and all questions were answered.    1. If medically indicated, she would benefit from a trial of an antidepressant medication.    2. Along similar lines, referral for psychotherapy services is recommended. One possible referral option would be [facility], with locations throughout the [facility]. They can be reached by calling [phone].    3. If medically indicated, alternate brain imaging techniques (e.g., MRI and/or FDG-PET) could aid in diagnostic clarification.    4. If she continues to have difficulties with attention and memory, routine use of a memory notebook or other assistive device could be of benefit.    5. Follow-up neuropsychological evaluation is recommended in one year in order to assess and update recommendations as appropriate. A follow-up exam could be helpful in determining if there is a progressive nature to her condition. However, I will be pleased to evaluate sooner if changes are noted are clinically  indicated.    Mikey Fisher, Ph.D., L.P., ABPP-CN   / Licensed Psychologist [id number]  Department of Physical Medicine & Rehabilitation  [facility]    Time spent:  four hours professional time, including interview, record review, data integration, and report writing (CPT 40809); three hours of testing administered by a psychometrist and interpreted by a neuropsychologist (CPT 74445). Diagnoses: R41.841, F33.1, F41.9.

## 2017-08-03 ENCOUNTER — TELEPHONE (OUTPATIENT)
Dept: NEUROLOGY | Facility: CLINIC | Age: 82
End: 2017-08-03

## 2017-08-03 NOTE — TELEPHONE ENCOUNTER
Reason for Call:  Medication or medication refill:    Do you use a [facility]?  Name of the pharmacy and phone number for the current request:  [facility] [address]    Name of the medication requested: See notes below -    Other request: Patient's spouse called to inquire about the status of the B12 medication that they were told was sent to her pharmacy. Per result notes from 8/1/2017 -    Notes Recorded by Syeda Lawrence CMA on 8/1/2017 at 9:26 AM  Patient advised.  Notes Recorded by Henrry De Anda MD on 8/1/2017 at 5:31 AM  Please, advise the patient that her vitamin B1 is slightly low. It will not fully explain her symptoms. Will see what the rest of the evaluation will show. I sent a prescription to her pharmacy ([facility]).  Henrry De Anda MD    - Does not look like the medication was ever prescribed. Please follow up. Thanks!    Can we leave a detailed message on this number? YES    Phone number patient can be reached at: Home number on file [phone] (home)    Best Time: Any    Call taken on 8/3/2017 at 3:50 PM by Jesica Natarajan

## 2017-08-04 DIAGNOSIS — I10 HYPERTENSION GOAL BP (BLOOD PRESSURE) < 140/90: ICD-10-CM

## 2017-08-04 NOTE — TELEPHONE ENCOUNTER
carvedilol (COREG) 25 MG tablet   25 mg, 2 TIMES DAILY WITH MEALS 1 ordered  Edit     Summary: Take 1 tablet (25 mg) by mouth 2 times daily (with meals), Disp-180 tablet, R-1, E-Prescribe   Dose, Route, Frequency: 25 mg, Oral, 2 TIMES DAILY WITH MEALS  Start: 1/16/2017  Ord/Sold: 1/16/2017 (O)  Report  Taking:   Long-term:   Pharmacy: [facility] [address] [place] [address] Dose History       Patient Sig: Take 1 tablet (25 mg) by mouth 2 times daily (with meals)       Ordered on: 1/16/2017       Authorized by: MERON EASON       Dispense: 180 tablet          Last Office Visit with G, P or [facility] prescribing provider:  6-   Future Office Visit:    Next 5 appointments (look out 90 days)     Oct 11, 2017 10:30 AM CDT   Return Visit with Henrry De Anda MD   [facility] ([facility])    [address] [place] [id number] [phone]                    BP Readings from Last 3 Encounters:   07/26/17 96/54   07/05/17 122/56   06/26/17 128/68

## 2017-08-08 RX ORDER — CARVEDILOL 25 MG/1
TABLET ORAL
Qty: 180 TABLET | Refills: 1 | Status: SHIPPED | OUTPATIENT
Start: 2017-08-08 | End: 2017-12-08

## 2017-08-08 NOTE — TELEPHONE ENCOUNTER
Routing refill request to provider for review/approval because:  See last BP.    Aldair Leach RN

## 2017-08-14 ENCOUNTER — HOSPITAL ENCOUNTER (OUTPATIENT)
Dept: OCCUPATIONAL THERAPY | Facility: CLINIC | Age: 82
Setting detail: THERAPIES SERIES
End: 2017-08-14
Attending: PSYCHIATRY & NEUROLOGY
Payer: MEDICARE

## 2017-08-14 PROCEDURE — G9170 MEMORY D/C STATUS: HCPCS | Mod: GO,CJ | Performed by: OCCUPATIONAL THERAPIST

## 2017-08-14 PROCEDURE — 97165 OT EVAL LOW COMPLEX 30 MIN: CPT | Mod: GO | Performed by: OCCUPATIONAL THERAPIST

## 2017-08-14 PROCEDURE — G9169 MEMORY GOAL STATUS: HCPCS | Mod: CJ | Performed by: OCCUPATIONAL THERAPIST

## 2017-08-14 PROCEDURE — 40000125 ZZHC STATISTIC OT OUTPT VISIT: Performed by: OCCUPATIONAL THERAPIST

## 2017-08-14 PROCEDURE — G9168 MEMORY CURRENT STATUS: HCPCS | Mod: GO,CJ | Performed by: OCCUPATIONAL THERAPIST

## 2017-08-14 PROCEDURE — 96125 COGNITIVE TEST BY HC PRO: CPT | Mod: GO,59 | Performed by: OCCUPATIONAL THERAPIST

## 2017-08-14 PROCEDURE — 97535 SELF CARE MNGMENT TRAINING: CPT | Mod: GO | Performed by: OCCUPATIONAL THERAPIST

## 2017-08-14 ASSESSMENT — ACTIVITIES OF DAILY LIVING (ADL)
IADL_QUICK_ADDS: MEAL PLANNING/PREPARATION;HOME/FINANCIAL/MANAGEMENT;COMMUNICATION/COMPUTER USE;COMMUNITY MOBILITY;CARE OF OTHERS

## 2017-08-14 NOTE — PROGRESS NOTES
Cognitive Performance Test    SUMMARY OF TEST:    The Cognitive Performance Test (CPT) is a standardized performance-based assessment to measure working memory/executive function processing capacities that underlie functional performance. Subtasks include common basic and instrumental activities of daily living (ADL/IADL) which are rated based on the manner in which patients respond to task demands of varying complexity. The total CPT score describes a level of functioning that indicates how information is processed, implications for functional activities, potential safety risks and a recommended level of supervision or assist based on cognitive function. The highest total score on this test is in the range of 5.6 to 5.8.    DATE OF TESTING: August 14, 2017    RESULTS OF TESTING:                                                                                         CPT Subtest Results    MEDBOX: 5.5/6 SHOP/GLOVES: 5/6 PHONE: NT/6   WASH:  4.5/5 TRAVEL: 4/6 TOAST: 4/5   DRESS: Nt/5   TOTAL CPT SCORE:  23/28     Average CPT Score  4.6/5.6    INTERPRETATION OF TEST RESULTS:    Based on the Cognitive Performance Test, this patient scored at CPT Level 4.5.  See CPT Levels reference below.    Summary of functional cognitive status:   Patient completing medbox with a general cue to check as needed meds and then took them out of the box.  General cue to look for bottom gloves to pay for the ones in which she had enough money for.  Wash completed with cue to go to sink.  Toast completed with cue to plug in.  For Travel patient needing written directions and unable to locate once at structure.    Factors affecting performance:  No additional problems noted    Recommendations:    Supervision in living setting:  Daily checks                                                       TIME ADMINISTERING TEST: 30    TIME FOR INTERPRETATION AND PREPARATION OF REPORT: 15    TOTAL TIME: 15    CPT Levels Reference:    Patient's Average  "CPT Score:  4.6                                                                                                                                                  Individual scores range along a continuum as outlined below.  In addition to cognitive status, other factors may affect safety in a home environment.  Please refer to specific recommendations for this patient.    ___5.6-5.8  Normal functioning (absence of cognitive-functional disability).  Independent in managing personal affairs, monitors and directs own behavior.  Uses complex information to carry out daily activities with safety and accuracy.    Proficient with instrumental activities of daily living (IADL) and learning new activity.  Problems are anticipated, errors are avoided, and consequences of actions are considered.      ___5.0   Mild cognitive-functional disability; deficits in working memory and executive thought processes. Difficulty using complex information. Problems may be observed with recent memory, judgment, reasoning and planning ahead. May be impulsive or have difficulty anticipating consequences.  Safety:  May require assistance to plan ahead; or to manage complex medication schedules, appointments or finances.  Hazardous activities may need to be monitored or limited.  ADL:  Mild functional decline.  Able to complete basic self-care and routine household tasks.  May have difficulty with complex daily tasks such as reading, writing, meal preparation, shopping or driving.   Learns through hands on teaching. Self-centered behavior or difficulty considering the needs of others may be seen related to trouble seeing the  whole picture\". Can appear disorganized or uninhibited.    _x__4.5  Mild to moderate cognitive-functional disability. Significant deficits in working memory and executive thought processes. Judgment, reasoning and planning show obvious impairment.  Distractible with inability to shift attention/actions given competing " stimuli.  Difficulty with problem solving and managing details. Complex daily tasks performed with inconsistency, difficulty, or error.     Safety:  Medications should be monitored, stove use may require supervision, and driving ability may be affected.  Impaired safety awareness with inability to anticipate potential problems.  May not recognize or respond to emergent situations. Requires frequent check-in support.   ADL:  Mild difficulty with simple everyday self-care tasks. Benefits from structured, routine activity.  Will likely need reminders to complete tasks outside of the routine. Requires assistance with planning and IADL tasks like shopping and finances. Learns concrete tasks through repetition, but performance may not generalize. Tends to be impulsive with poor insight. Self centered behavior or inability to consider the needs of others is common.    ___4.0  Moderate cognitive-functional disability; abstract to concrete thought processes. Working memory and executive function impairments are obvious. Difficulty with planning and problem solving.  Behavior is goal-directed, but unable to follow multi-step directions, is easily distracted, and may not recognize mistakes.  Inability to anticipate hazards or understand precautions.  Safety:  Recommend 24-hour supervision for safety. Supervision needed for medication management and for hazardous activities. May not be able to follow a restricted diet. Can get lost in unfamiliar surroundings. Generally, persons functioning at level 4 should not be driving.   ADL:  Some decline in quality or frequency of ADL.  [place] enhanced by use of a routine, simple concrete directions, and caregiver set-up of needed items. Complex tasks such as money or home management typically requires assistance.  Relies heavily on vision to guide behavior; will ignore objects/hazards not in plain sight and can be distracted by irrelevant objects. Often has poor insight.  Able to  carry out social conversation and may verbally  cover  for deficits leading caregivers to believe they are capable of functioning independently.       ___3.5  Moderate cognitive-functional disability; increased cues needed for task completion. Aware of concrete task steps but needs prompting or cues to initiate and complete simple tasks. Attention span is limited, simple directions may need to be repeated, and re-focus to a topic or task may be required.  Safety:  24-hour supervision required for safety and for assistance with daily tasks. Assistance required with medications, and access to medication should be limited. Meals, nutrition and dietary restrictions need to be monitored.  All hazardous activities should be restricted or supervised. Should not drive. Prone to wandering and can become lost.  ADL:  Moderate functional decline. Familiar tasks usually requires set-up of supplies and directions to complete steps. May need objects handed to them for task initiation. Function best with a set schedule in familiar surroundings with familiar people. All complex tasks must be done by others. Vocabulary is diminished and speech often unfocused.       Electronically signed by:  Leanne Ramirez OTR/L, ATP       Occupational Therapist, Assistive   [phone]      fax: [phone]      [email or web address], [address]  [address]  [place] [address]
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
## 2017-08-14 NOTE — PROGRESS NOTES
[facility]          OUTPATIENT OCCUPATIONAL THERAPY  EVALUATION  PLAN OF TREATMENT FOR OUTPATIENT REHABILITATION  (COMPLETE FOR INITIAL CLAIMS ONLY)  Patient's Last Name, First Name, M.I.  YOB: 1935  Ghada Magana                        Provider's Name  [facility] Medical Record No.  [id number]                               Onset Date:     07/26/17 (orders)   Start of Care Date:     08/14/17   Type:     ___PT   _X_OT   ___SLP Medical Diagnosis:     Memory loss                          OT Diagnosis:     Memory loss limits safety with adls and iadls Visits from SOC:  1   _________________________________________________________________________________  Plan of Treatment/Functional Goals:  ADL training, Cognitive performance testing, Cognitive skills     CPT         Goals  Goal Identifier: CPt  Goal Description: Patient will participate  in CPT in order to determine cog base line and make rec based on scores.  Target Date: 08/14/17    Therapy Frequency: once     Predicted Duration of Therapy Intervention (days/wks): today  Leanne Ramirez, OTR/L,ATP       I CERTIFY THE NEED FOR THESE SERVICES FURNISHED UNDER        THIS PLAN OF TREATMENT AND WHILE UNDER MY CARE     (Physician co-signature of this document indicates review and certification of the therapy plan).                Certification date from: 08/14/17, Certification date to: 08/14/17               Referring Physician: Henrry Pichardo     Initial Assessment        See Epic Evaluation      Start Of Care Date: 08/14/17

## 2017-08-22 ENCOUNTER — TRANSFERRED RECORDS (OUTPATIENT)
Dept: HEALTH INFORMATION MANAGEMENT | Facility: CLINIC | Age: 82
End: 2017-08-22

## 2017-09-25 DIAGNOSIS — I10 HYPERTENSION GOAL BP (BLOOD PRESSURE) < 140/90: ICD-10-CM

## 2017-09-25 NOTE — TELEPHONE ENCOUNTER
losartan (COZAAR) 100 MG tablet  Last Office Visit with [facility], [facility] or [facility] prescribing provider: 6-  Next 5 appointments (look out 90 days)     Oct 11, 2017 10:30 AM CDT   Return Visit with Henrry De Anda MD   [facility] ([facility])    [address] [place] [id number] [phone]                   Potassium   Date Value Ref Range Status   06/08/2017 4.7 3.4 - 5.3 mmol/L Final     Creatinine   Date Value Ref Range Status   06/08/2017 0.74 0.52 - 1.04 mg/dL Final     BP Readings from Last 3 Encounters:   07/26/17 96/54   07/05/17 122/56   06/26/17 128/68

## 2017-09-28 RX ORDER — LOSARTAN POTASSIUM 100 MG/1
TABLET ORAL
Qty: 90 TABLET | Refills: 0 | Status: SHIPPED | OUTPATIENT
Start: 2017-09-28 | End: 2017-12-08

## 2017-09-28 NOTE — TELEPHONE ENCOUNTER
Prescription approved per [facility].  Mar Fairchild,Clinic Rn  Ragley Johnson

## 2017-10-11 ENCOUNTER — OFFICE VISIT (OUTPATIENT)
Dept: NEUROLOGY | Facility: CLINIC | Age: 82
End: 2017-10-11
Payer: MEDICARE

## 2017-10-11 VITALS
WEIGHT: 113 LBS | BODY MASS INDEX: 19.7 KG/M2 | OXYGEN SATURATION: 97 % | HEART RATE: 73 BPM | TEMPERATURE: 98.7 F | SYSTOLIC BLOOD PRESSURE: 128 MMHG | DIASTOLIC BLOOD PRESSURE: 64 MMHG | RESPIRATION RATE: 18 BRPM

## 2017-10-11 DIAGNOSIS — E51.9 THIAMIN DEFICIENCY: ICD-10-CM

## 2017-10-11 DIAGNOSIS — R41.9 COGNITIVE COMPLAINTS: Primary | ICD-10-CM

## 2017-10-11 PROCEDURE — 84425 ASSAY OF VITAMIN B-1: CPT | Mod: 90 | Performed by: PSYCHIATRY & NEUROLOGY

## 2017-10-11 PROCEDURE — 36415 COLL VENOUS BLD VENIPUNCTURE: CPT | Performed by: PSYCHIATRY & NEUROLOGY

## 2017-10-11 PROCEDURE — 99000 SPECIMEN HANDLING OFFICE-LAB: CPT | Performed by: PSYCHIATRY & NEUROLOGY

## 2017-10-11 PROCEDURE — 99214 OFFICE O/P EST MOD 30 MIN: CPT | Performed by: PSYCHIATRY & NEUROLOGY

## 2017-10-11 NOTE — NURSING NOTE
"Chief Complaint   Patient presents with     Follow Up For     OT Neuropsych       Initial /64 (BP Location: Right arm, Patient Position: Chair, Cuff Size: Adult Large)  Pulse 73  Temp 98.7  F (37.1  C) (Oral)  Resp 18  Wt 51.3 kg (113 lb)  SpO2 97%  BMI 19.7 kg/m2 Estimated body mass index is 19.7 kg/(m^2) as calculated from the following:    Height as of 7/26/17: 1.613 m (5' 3.5\").    Weight as of this encounter: 51.3 kg (113 lb).  Medication Reconciliation: complete     Syeda Lawrence CMA      "

## 2017-10-11 NOTE — PATIENT INSTRUCTIONS
AFTER VISIT SUMMARY (AVS):    At today's visit we discussed the results of your testing for memory difficulty, which was not consistent with dementia and felt to be possibly related to not optimally treated depression and anxiety. We also reviewed the plan, which includes:  Orders Placed This Encounter   Procedures     Vitamin B1 whole blood     Please, discuss the treatment of anxiety and depression with your primary care provider.    No new medications were ordered.    Preventive Neurology: Encouraged to stay physically and mentally active with particular emphasis on daily mentally stimulating activities of your choice (such as crosswords, puzzles, sudoku, etc.), stretching exercises, walking, and healthy eating.    Additional recommendations after the work-up.    Next follow-up appointment is in the next 1 year or earlier if needed.    Please do not hesitate to call me with any questions or concerns.    Thanks.

## 2017-10-11 NOTE — PROGRESS NOTES
ESTABLISHED PATIENT NEUROLOGY NOTE    DATE OF VISIT: 10/11/2017  [place] LOC[place] [facility] [place]  MRN: [id number]  PATIENT NAME: Ghada Magana  YOB: 1935    PCP: MERON EASON PA-C    REASON FOR VISIT:   Chief Complaint   Patient presents with     Follow Up For     OT Neuropsych     SUBJECTIVE:                                                      HISTORY OF PRESENT ILLNESS: Patient is here for follow up regarding cognitive dysfunction. Please refer to my initial note from 07/26/2017 for further information. The patient was accompanied by her , who participates in interview.    Since the last visit, the patient completed the recommended workup, as summarized below. Her vitamin B1 was low, and she regularly takes her thiamine replacement. She denies any interval worsening of her memory difficulties. She also denies any other new focal neurological symptoms.    On review of systems, patient endorses no new active complaints. Medications, allergies, family and social history were also reviewed. There are no changes reported by patient.    CURRENT MEDICATIONS:   Current Outpatient Prescriptions on File Prior to Visit:  losartan (COZAAR) 100 MG tablet TAKE ONE TABLET BY MOUTH ONE TIME DAILY    carvedilol (COREG) 25 MG tablet take 1 tablet by mouth twice daily with meals   thiamine mononitrate 100 MG TABS Take 100 mg by mouth daily   amLODIPine (NORVASC) 5 MG tablet Take 1 tablet (5 mg) by mouth daily   TYLENOL 325 MG PO TABS 2 TABLETS EVERY 4 HOURS AS NEEDED   CALCIUM 600 + D 600-200 MG-UNIT OR TABS 2 per day.    UNKNOWN MED DOSAGE timolol eye gtts one drop in each eye twice daily   MULTI-VITAMIN OR TABS take one daily   ASPIRIN 81 MG OR TABS 1 tab po QD (Once per day)     REVIEW OF SYSTEMS:                                                    10-system review was completed. Pertinent positives are included in HPI. The remainder of ROS is negative.  EXAM:                                                     Physical Exam:   Vitals: /64 (BP Location: Right arm, Patient Position: Chair, Cuff Size: Adult Large)  Pulse 73  Temp 98.7  F (37.1  C) (Oral)  Resp 18  Wt 51.3 kg (113 lb)  SpO2 97%  BMI 19.7 kg/m2    General: pt is in NAD, cooperative.  Skin: normal turgor, moist mucous membranes, no lesions/rashes noticed.  HEENT: ATNC, white sclera, normal conjunctiva.  Respiratory:Symmetric lung excursion, no accessory respiratory muscle use.  Abdomen: Non distended.  Neurological: awake, cooperative, follows commands, no aphasia or dysarthria noted, cranial nerves II-XII: no ptosis, extraocular motility is full, face is symmetric, tongue is midline, equally moves all extremities, no dysmetria bilaterally, gait is normal.    DATA:     Labs: I personally reviewed the following labs:  Orders Only on 07/26/2017   Component Date Value Ref Range Status     Vitamin B1 Whole Blood Level 07/26/2017 67*  Final    Comment: Reference range: 70 to 180  Unit: nmol/L  (Note)  INTERPRETIVE INFORMATION: Vitamin B1, Whole Blood  This assay measures the concentration of thiamine  diphosphate (TDP), the primary active form of vitamin B1.  Approximately 90 percent of vitamin B1 present in whole  blood is TDP. Thiamine and thiamine monophosphate, which  comprise the remaining 10 percent, are not measured.  Test developed and characteristics determined by [email or web address]. See Compliance Statement B: [employer]  Performed by [email or web address],  [address] [place] [id number] [phone]  [email or web address], Tirso Ross MD, Lab. Director     DATA:  CPT 08/14/2017: 4.6/5.6 consistent with mild-to-moderate cognitive functional disability.  NEUROPSYCHOLOGICAL EVALUATION 07/31/2017: Ms. Magana demonstrated a pattern of weaknesses that raises some question of bilateral frontal and temporal region dysfunction. The etiology is not certain, but this pattern could be seen as consistent with cerebrovascular disease.  That said, I strongly suspect that psychological factors, namely depression and anxiety, are contributing to her performances in the current exam as well as to her concerns about difficulties in day-to-day life. It is not clear to me the degree to which the weakness seen in the current exam are reflective of brain dysfunction, or whether her cognitive difficulties are attributable to psychological factors. In this exam, weaknesses and variability were identified in aspects of expressive language, some executive abilities, and some aspects of verbal memory. Importantly, however, in many cases she was able to demonstrate entirely normal performances on each of these domains, including memory. I would predict a reduction in her subjective concerns about cognitive dysfunction following improved management of her mental health.  ASSESSMENT and PLAN:                                                    Assessment: 82-year-old female patient presents for follow-up of her cognitive dysfunction for the last 2 years after the completion of workup. She denies any interval worsening of her symptoms. Her vitamin B1 level was found to be marginally low. She is on replacement and is due for thiamine level recheck today. Her CPT demonstrated mild to moderate cognitive functional disability. Neuropsychological testing was negative for dementia and essentially within normal limits with some relative weaknesses suggestive of possible mild bilateral frontal and temporal dysfunction, as could be seen with cerebral vascular disease (though no significant changes were seen on prior brain imaging to support this suspicion). It was also suggested that the treatment of underlying depression and anxiety might improve her cognitive symptoms.    We thoroughly discussed the results of memory evaluation. At this point, the findings are not consistent with emerging neurodegenerative disorder. However, observation over time is needed to provide more  accurate diagnosis. It would be reasonable to repeat neuropsychological evaluation in 1 year from now to evaluate for interval changes. Meanwhile, I recommended the patient to discuss the treatment of depression and anxiety with her primary care provider. The rest of the plan is detailed below.    Diagnoses:    ICD-10-CM    1. Cognitive complaints R41.9    2. Thiamin deficiency E51.9 Vitamin B1 whole blood     Plan: At today's visit we thoroughly discussed the results of her testing for memory difficulty, which was not consistent with dementia and felt to be possibly related to not optimally treated depression and anxiety. We also reviewed the plan, which includes:  Orders Placed This Encounter   Procedures     Vitamin B1 whole blood     I advised the patient to discuss the treatment of anxiety and depression with her primary care provider.    No new medications were ordered.    Preventive Neurology: Encouraged to stay physically and mentally active with particular emphasis on daily mentally stimulating activities of her choice (such as crosswords, puzzles, sudoku, etc.), stretching exercises, walking, and healthy eating.    Additional recommendations after the work-up.    Next follow-up appointment is in the next 1 year or earlier if needed.    Advised the patient to call me with any questions or concerns.    Total Time: 35 minutes with > 50% spent counseling the patient and her  on stated above assessment and recommendations, including nature of the diagnosis, results of the w/u, and proposed plan. Additional time was used to answer their questions.    Henrry De Anda MD  / Neurology

## 2017-10-11 NOTE — MR AVS SNAPSHOT
After Visit Summary   10/11/2017    Ghada Magana    MRN: [id number]           Patient Information     Date Of Birth          1935        Visit Information        Provider Department      10/11/2017 10:30 AM Henrry De Anda MD Formerly named [facility]        Today's Diagnoses     Cognitive complaints    -  1    Thiamin deficiency          Care Instructions    AFTER VISIT SUMMARY (AVS):    At today's visit we discussed the results of your testing for memory difficulty, which was not consistent with dementia and felt to be possibly related to not optimally treated depression and anxiety. We also reviewed the plan, which includes:  Orders Placed This Encounter   Procedures     Vitamin B1 whole blood     Please, discuss the treatment of anxiety and depression with your primary care provider.    No new medications were ordered.    Preventive Neurology: Encouraged to stay physically and mentally active with particular emphasis on daily mentally stimulating activities of your choice (such as crosswords, puzzles, sudoku, etc.), stretching exercises, walking, and healthy eating.    Additional recommendations after the work-up.    Next follow-up appointment is in the next 1 year or earlier if needed.    Please do not hesitate to call me with any questions or concerns.    Thanks.            Follow-ups after your visit        Follow-up notes from your care team     Return in about 1 year (around 10/11/2018).      Your next 10 appointments already scheduled     Oct 10, 2018 11:00 AM CDT   Return Visit with Henrry De Anda MD   [facility] (Formerly named [facility])    [address] [place] [address] [phone]              Who to contact     If you have questions or need follow up information about today's clinic visit or your schedule please contact [facility] directly at [phone].  Normal or non-critical lab and  "imaging results will be communicated to you by MyChart, letter or phone within 4 business days after the clinic has received the results. If you do not hear from us within 7 days, please contact the clinic through [employer] or phone. If you have a critical or abnormal lab result, we will notify you by phone as soon as possible.  Submit refill requests through [employer] or call your pharmacy and they will forward the refill request to us. Please allow 3 business days for your refill to be completed.          Additional Information About Your Visit        [employer]cent[employer]arHolaira Information     [employer] lets you send messages to your doctor, view your test results, renew your prescriptions, schedule appointments and more. To sign up, go to [email or web address] . Click on \"Log in\" on the left side of the screen, which will take you to the Welcome page. Then click on \"Sign up Now\" on the right side of the page.     You will be asked to enter the access code listed below, as well as some personal information. Please follow the directions to create your username and password.     Your access code is: [email or web address]  Expires: 2018 10:43 AM     Your access code will  in 90 days. If you need help or a new code, please call your [facility] clinic or [phone].        Care EveryWhere ID     This is your Care EveryWhere ID. This could be used by other organizations to access your [facility] medical records  [id number]        Your Vitals Were     Pulse Temperature Respirations Pulse Oximetry BMI (Body Mass Index)       73 98.7  F (37.1  C) (Oral) 18 97% 19.7 kg/m2        Blood Pressure from Last 3 Encounters:   10/11/17 128/64   17 96/54   17 122/56    Weight from Last 3 Encounters:   10/11/17 51.3 kg (113 lb)   17 51.3 kg (113 lb)   17 51.7 kg (114 lb)              We Performed the Following     Vitamin B1 whole blood        Primary Care Provider Office Phone # Fax #    Chip Barlow PA-C [phone] " [phone]       [address] [facility] [address]        Equal Access to Services     TUYETDIPAK BENITO : Hadii aad ku hadhayleymilly Sonilam, waricardoda luqadaha, qaybta kaalmada kathrinalejenna, waxay idiin hayloliskaleigh tavarezmanuelariana shelton. [facility] [phone].    ATENCIÓN: Si habla español, tiene a dahl disposición servicios gratuitos de asistencia lingüística. Llame al [phone].    We comply with applicable federal civil rights laws and [place] laws. We do not discriminate on the basis of race, color, national origin, age, disability, sex, sexual orientation, or gender identity.            Thank you!     Thank you for choosing [facility]  for your care. Our goal is always to provide you with excellent care. Hearing back from our patients is one way we can continue to improve our services. Please take a few minutes to complete the written survey that you may receive in the mail after your visit with us. Thank you!             Your Updated Medication List - Protect others around you: Learn how to safely use, store and throw away your medicines at www.disposemymeds.org.          This list is accurate as of: 10/11/17 10:43 AM.  Always use your most recent med list.                   Brand Name Dispense Instructions for use Diagnosis    amLODIPine 5 MG tablet    NORVASC    90 tablet    Take 1 tablet (5 mg) by mouth daily    Hypertension goal BP (blood pressure) < 140/90       aspirin 81 MG tablet     100    1 tab po QD (Once per day)        CALCIUM 600 + D 600-200 MG-UNIT Tabs     0    2 per day.        carvedilol 25 MG tablet    COREG    180 tablet    take 1 tablet by mouth twice daily with meals    Hypertension goal BP (blood pressure) < 140/90       losartan 100 MG tablet    COZAAR    90 tablet    TAKE ONE TABLET BY MOUTH ONE TIME DAILY    Hypertension goal BP (blood pressure) < 140/90       Multi-vitamin Tabs tablet   Generic drug:  multivitamin, therapeutic with minerals      take one daily         thiamine mononitrate 100 MG Tabs     30 tablet    Take 100 mg by mouth daily    Thiamin deficiency       TYLENOL 325 MG tablet   Generic drug:  acetaminophen      2 TABLETS EVERY 4 HOURS AS NEEDED        UNKNOWN MED DOSAGE      timolol eye gtts one drop in each eye twice daily

## 2017-10-14 LAB — VIT B1 BLD-MCNC: 83 NMOL/L (ref 70–180)

## 2017-10-27 ENCOUNTER — OFFICE VISIT (OUTPATIENT)
Dept: FAMILY MEDICINE | Facility: CLINIC | Age: 82
End: 2017-10-27
Payer: MEDICARE

## 2017-10-27 VITALS
BODY MASS INDEX: 19.29 KG/M2 | SYSTOLIC BLOOD PRESSURE: 110 MMHG | WEIGHT: 113 LBS | HEART RATE: 84 BPM | DIASTOLIC BLOOD PRESSURE: 56 MMHG | HEIGHT: 64 IN | TEMPERATURE: 97.9 F

## 2017-10-27 DIAGNOSIS — G31.84 MCI (MILD COGNITIVE IMPAIRMENT) WITH MEMORY LOSS: Primary | ICD-10-CM

## 2017-10-27 DIAGNOSIS — F32.0 MILD MAJOR DEPRESSION (H): ICD-10-CM

## 2017-10-27 DIAGNOSIS — Z23 NEED FOR PROPHYLACTIC VACCINATION AGAINST STREPTOCOCCUS PNEUMONIAE (PNEUMOCOCCUS): ICD-10-CM

## 2017-10-27 PROCEDURE — 99214 OFFICE O/P EST MOD 30 MIN: CPT | Performed by: PHYSICIAN ASSISTANT

## 2017-10-27 NOTE — NURSING NOTE
"Chief Complaint   Patient presents with     Memory Loss     Follow up        Initial /56 (BP Location: Right arm, Cuff Size: Adult Regular)  Pulse 84  Temp 97.9  F (36.6  C) (Oral)  Ht 5' 3.5\" (1.613 m)  Wt 113 lb (51.3 kg)  BMI 19.7 kg/m2 Estimated body mass index is 19.7 kg/(m^2) as calculated from the following:    Height as of this encounter: 5' 3.5\" (1.613 m).    Weight as of this encounter: 113 lb (51.3 kg).  Medication Reconciliation: complete     Anneliese Brown CMA (AAMA)      "

## 2017-10-27 NOTE — PROGRESS NOTES
SUBJECTIVE:   Ghada Magana is a 82 year old female who presents to clinic today for the following health issues:    Patient is here to follow up on her memory loss. She did see neurology and had neuropsych testing. There were no specific findings to suggest that she has formal dementia and issues related to memory seeking, word finding, short term were more likely anxiety and depression based. She reports continued challenges and is here to discuss. She was found to have a low thiamine on labs which she is taking replacement for. That has not helped things particularly.     In terms of mood, she feels she could have mild depression. 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 These have been by choice and her 's insistence, but it seems to have impacted things a bit. She reports lower mood than normal.    Patient Active Problem List   Diagnosis     Congenital anomaly of lung     Osteoporosis     Anemia     Hyperlipidemia LDL goal <130     Hypertension goal BP (blood pressure) < 140/90     Advanced directives, counseling/discussion     Chronic cough     MCI (mild cognitive impairment) with memory loss      Current Outpatient Prescriptions   Medication     sertraline (ZOLOFT) 50 MG tablet     losartan (COZAAR) 100 MG tablet     carvedilol (COREG) 25 MG tablet     thiamine mononitrate 100 MG TABS     amLODIPine (NORVASC) 5 MG tablet     TYLENOL 325 MG PO TABS     CALCIUM 600 + D 600-200 MG-UNIT OR TABS     UNKNOWN MED DOSAGE     MULTI-VITAMIN OR TABS     ASPIRIN 81 MG OR TABS     No current facility-administered medications for this visit.       Problem list and histories reviewed & adjusted, as indicated.  Additional history: as documented    Labs reviewed in EPIC    Reviewed and updated as needed this visit by clinical staff       Reviewed and updated as needed this visit by Provider         ROS:  Constitutional, HEENT, cardiovascular,  "pulmonary, gi and gu systems are negative, except as otherwise noted.      OBJECTIVE:   /56 (BP Location: Right arm, Cuff Size: Adult Regular)  Pulse 84  Temp 97.9  F (36.6  C) (Oral)  Ht 5' 3.5\" (1.613 m)  Wt 113 lb (51.3 kg)  BMI 19.7 kg/m2  Body mass index is 19.7 kg/(m^2).  GENERAL: healthy, alert and no distress  Psych: Appropriate appearance.  Alert and oriented times 3; coherent but slow, catious speech, normal rate and volume, able to articulate logical thoughts, able   to abstract reason, no tangential thoughts, no hallucinations   or delusions.  Normal behavior.  Her affect is flat.      ASSESSMENT/PLAN:     (G31.84) MCI (mild cognitive impairment) with memory loss  (primary encounter diagnosis)  Comment:   Plan: She has follow up planned for neurology but her neuropsych and neuro evaluation were overall reassuring. I think some early dementia could be present vs mild depression. She certainly is flatter and less interactive with me at our visits. She does seem to hesitate and word seek. That being said, her  often dominates the conversation, so I'm not sure how much of this is learned or an actual mood issue. She admits to feeling down more. Mild depression management is reasonable. I discussed options and offered up Sertraline to start as a trial. 25 mg initially, then up to 1 tablet fully a day. Orders placed. I did review that in some cases the anticholinergic effects of an SSRI can make memory worse so they should watch for that.     (F32.0) Mild major depression (H)  Comment:   Plan: sertraline (ZOLOFT) 50 MG tablet        As noted     (Z23) Need for prophylactic vaccination against Streptococcus pneumoniae (pneumococcus)  Comment:   Plan: Recommend. Will consider.    30 min visit over 50% counseling and education, follow up in 2 to 3 weeks. Sooner if issues.    MERON EASON PA-C  [facility]    "

## 2017-10-27 NOTE — MR AVS SNAPSHOT
"              After Visit Summary   10/27/2017    Ghada Magana    MRN: [id number]           Patient Information     Date Of Birth          1935        Visit Information        Provider Department      10/27/2017 1:00 PM Chip Barlow PA-C [facility]        Today's Diagnoses     MCI (mild cognitive impairment) with memory loss    -  1    Mild major depression (H)        Need for prophylactic vaccination against Streptococcus pneumoniae (pneumococcus)           Follow-ups after your visit        Your next 10 appointments already scheduled     Oct 10, 2018 11:00 AM CDT   Return Visit with Henrry De Anda MD   [facility] ([facility])    [address] [place] [address] [phone]              Who to contact     If you have questions or need follow up information about today's clinic visit or your schedule please contact [facility] directly at [phone].  Normal or non-critical lab and imaging results will be communicated to you by Marco Vascohart, letter or phone within 4 business days after the clinic has received the results. If you do not hear from us within 7 days, please contact the clinic through ZettaCoret or phone. If you have a critical or abnormal lab result, we will notify you by phone as soon as possible.  Submit refill requests through Kinetic Social or call your pharmacy and they will forward the refill request to us. Please allow 3 business days for your refill to be completed.          Additional Information About Your Visit        Marco Vascohart Information     Kinetic Social lets you send messages to your doctor, view your test results, renew your prescriptions, schedule appointments and more. To sign up, go to [email or web address] . Click on \"Log in\" on the left side of the screen, which will take you to the Welcome page. Then click on \"Sign up Now\" on the right side of the page.     You will be asked to " "enter the access code listed below, as well as some personal information. Please follow the directions to create your username and password.     Your access code is: [email or web address]  Expires: 2018 10:43 AM     Your access code will  in 90 days. If you need help or a new code, please call your [facility] clinic or [phone].        Care EveryWhere ID     This is your Care EveryWhere ID. This could be used by other organizations to access your [facility] medical records  [id number]        Your Vitals Were     Pulse Temperature Height BMI (Body Mass Index)          84 97.9  F (36.6  C) (Oral) 5' 3.5\" (1.613 m) 19.7 kg/m2         Blood Pressure from Last 3 Encounters:   10/27/17 110/56   10/11/17 128/64   17 96/54    Weight from Last 3 Encounters:   10/27/17 113 lb (51.3 kg)   10/11/17 113 lb (51.3 kg)   17 113 lb (51.3 kg)              Today, you had the following     No orders found for display         Today's Medication Changes          These changes are accurate as of: 10/27/17  1:53 PM.  If you have any questions, ask your nurse or doctor.               Start taking these medicines.        Dose/Directions    sertraline 50 MG tablet   Commonly known as:  ZOLOFT   Used for:  Mild major depression (H)   Started by:  Chip Barlow PA-C        1/2 tab daily for 1 week, then 1 tab daily   Quantity:  90 tablet   Refills:  0            Where to get your medicines      These medications were sent to [facility] [address] [place] [address]     Phone:  [phone]     sertraline 50 MG tablet                Primary Care Provider Office Phone # Fax #    Chip Barlow PA-C [phone] [address] [facility] [address]        Equal Access to Services     MARLINE OLIVER AH: Zoila luke Sonilam, waricardoda luqadaha, qaybta kaalmada adeegyajulieta west. So [facility] " [phone].    ATENCIÓN: Si lawanda mehta, tiene a dahl disposición servicios gratuitos de asistencia lingüística. Tricia cisneros [phone].    We comply with applicable federal civil rights laws and [place] laws. We do not discriminate on the basis of race, color, national origin, age, disability, sex, sexual orientation, or gender identity.            Thank you!     Thank you for choosing [facility]  for your care. Our goal is always to provide you with excellent care. Hearing back from our patients is one way we can continue to improve our services. Please take a few minutes to complete the written survey that you may receive in the mail after your visit with us. Thank you!             Your Updated Medication List - Protect others around you: Learn how to safely use, store and throw away your medicines at www.disposemymeds.org.          This list is accurate as of: 10/27/17  1:53 PM.  Always use your most recent med list.                   Brand Name Dispense Instructions for use Diagnosis    amLODIPine 5 MG tablet    NORVASC    90 tablet    Take 1 tablet (5 mg) by mouth daily    Hypertension goal BP (blood pressure) < 140/90       aspirin 81 MG tablet     100    1 tab po QD (Once per day)        CALCIUM 600 + D 600-200 MG-UNIT Tabs     0    2 per day.        carvedilol 25 MG tablet    COREG    180 tablet    take 1 tablet by mouth twice daily with meals    Hypertension goal BP (blood pressure) < 140/90       losartan 100 MG tablet    COZAAR    90 tablet    TAKE ONE TABLET BY MOUTH ONE TIME DAILY    Hypertension goal BP (blood pressure) < 140/90       Multi-vitamin Tabs tablet   Generic drug:  multivitamin, therapeutic with minerals      take one daily        sertraline 50 MG tablet    ZOLOFT    90 tablet    1/2 tab daily for 1 week, then 1 tab daily    Mild major depression (H)       thiamine mononitrate 100 MG Tabs     30 tablet    Take 100 mg by mouth daily    Thiamin deficiency       TYLENOL  325 MG tablet   Generic drug:  acetaminophen      2 TABLETS EVERY 4 HOURS AS NEEDED        UNKNOWN MED DOSAGE      timolol eye gtts one drop in each eye twice daily

## 2017-10-27 NOTE — LETTER
"[facility]  [address] [place] [address]  [phone]          2017    RE:  Ghada Magana                                                                                                                                                       [phone]  [facility] [address]            Doctor Melvin,    I had the pleasure of seeing our mutual patient Ghada Magana (: 1935) in clinic recently. Her  gave me your card and asked that I speak with you regarding her ongoing cough/infectious disease concerns with her lungs.    The Clinic appointment number routed me to the \"On Call MD\" number. The On Call switchboard routed me to a [facility] infectious disease clinic 2 times which is not where you are, obviously.    I'm happy to speak regarding Ghada's care. My Cell Phone is . Or, you can call my clinic and have me paged: [phone].     Kind regards,         Chip Barlow, JESSICAS, PA-C   Family Medicine       "

## 2017-10-30 DIAGNOSIS — I10 HYPERTENSION GOAL BP (BLOOD PRESSURE) < 140/90: ICD-10-CM

## 2017-11-02 RX ORDER — AMLODIPINE BESYLATE 5 MG/1
TABLET ORAL
Qty: 90 TABLET | Refills: 1 | Status: SHIPPED | OUTPATIENT
Start: 2017-11-02 | End: 2017-12-08

## 2017-11-22 ENCOUNTER — TRANSFERRED RECORDS (OUTPATIENT)
Dept: HEALTH INFORMATION MANAGEMENT | Facility: CLINIC | Age: 82
End: 2017-11-22

## 2017-12-08 ENCOUNTER — OFFICE VISIT (OUTPATIENT)
Dept: FAMILY MEDICINE | Facility: CLINIC | Age: 82
End: 2017-12-08
Payer: MEDICARE

## 2017-12-08 VITALS
WEIGHT: 110 LBS | HEART RATE: 84 BPM | SYSTOLIC BLOOD PRESSURE: 130 MMHG | TEMPERATURE: 97.7 F | DIASTOLIC BLOOD PRESSURE: 62 MMHG | HEIGHT: 64 IN | BODY MASS INDEX: 18.78 KG/M2

## 2017-12-08 DIAGNOSIS — I10 HYPERTENSION GOAL BP (BLOOD PRESSURE) < 140/90: ICD-10-CM

## 2017-12-08 DIAGNOSIS — G31.84 MILD COGNITIVE IMPAIRMENT: Primary | ICD-10-CM

## 2017-12-08 DIAGNOSIS — A31.0 MYCOBACTERIUM AVIUM INFECTION (H): ICD-10-CM

## 2017-12-08 DIAGNOSIS — F32.0 MILD MAJOR DEPRESSION (H): ICD-10-CM

## 2017-12-08 PROCEDURE — 99214 OFFICE O/P EST MOD 30 MIN: CPT | Performed by: PHYSICIAN ASSISTANT

## 2017-12-08 RX ORDER — RIFAMPIN 300 MG/1
CAPSULE ORAL
COMMUNITY
Start: 2017-10-25 | End: 2018-08-23

## 2017-12-08 RX ORDER — AZITHROMYCIN 500 MG/1
TABLET, FILM COATED ORAL
COMMUNITY
Start: 2017-10-25 | End: 2018-08-23

## 2017-12-08 RX ORDER — AMLODIPINE BESYLATE 5 MG/1
5 TABLET ORAL DAILY
Qty: 90 TABLET | Refills: 1 | Status: SHIPPED | OUTPATIENT
Start: 2017-12-08 | End: 2018-12-02

## 2017-12-08 RX ORDER — CARVEDILOL 25 MG/1
25 TABLET ORAL 2 TIMES DAILY WITH MEALS
Qty: 180 TABLET | Refills: 1 | Status: SHIPPED | OUTPATIENT
Start: 2017-12-08 | End: 2018-09-04

## 2017-12-08 RX ORDER — ETHAMBUTOL HYDROCHLORIDE 400 MG/1
TABLET, FILM COATED ORAL
COMMUNITY
Start: 2017-10-25 | End: 2018-08-23

## 2017-12-08 RX ORDER — LOSARTAN POTASSIUM 100 MG/1
100 TABLET ORAL DAILY
Qty: 90 TABLET | Refills: 1 | Status: SHIPPED | OUTPATIENT
Start: 2017-12-08 | End: 2018-02-02

## 2017-12-08 NOTE — PATIENT INSTRUCTIONS
[facility]   Discharged by : Yessenia Cheung MA  Paper scripts provided to patient : no   If you have any questions regarding to your visit please contact your care team:     Team Silver              Clinic Hours Telephone Number     Dr. Anupam Salguero PA-C   7am-7pm  Monday - Thursday   7am-5pm  Fridays  [phone]   (Appointment scheduling available 24/7)     RN Line  [phone] option 2     Urgent Care - [place] and [place] - 11am-9pm Monday-Friday Saturday-Sunday- 9am-5pm     [place] -   5pm-9pm Monday-Friday Saturday-Sunday- 9am-5pm    [phone] - [place] [facility]za    [phone] - [place]     For a Price Quote for your services, please call our Linkfluence Price Line at [phone].     What options do I have for visits at the clinic other than the traditional office visit?     To expand how we care for you, many of our providers are utilizing electronic visits (e-visits) and telephone visits, when medically appropriate, for interactions with their patients rather than a visit in the clinic. We also offer nurse visits for many medical concerns. Just like any other service, we will bill your insurance company for this type of visit based on time spent on the phone with your provider. Not all insurance companies cover these visits. Please check with your medical insurance if this type of visit is covered. You will be responsible for any charges that are not paid by your insurance.   E-visits via kontoblick: generally incur a $35.00 fee.     Telephone visits:   Time spent on the phone: *charged based on time that is spent on the phone in increments of 10 minutes. Estimated cost:   5-10 mins $30.00   11-20 mins. $59.00   21-30 mins. $85.00     Use kontoblick (secure email communication and access to your chart) to send your primary care provider a message or make an appointment. Ask someone on your Team how to sign up  for Echo360.     As always, Thank you for trusting us with your health care needs!      [facility]:    Scheduling Appointments  [facility]  Call: [phone]    [facility] [place] [place]  Call: [phone]    [facility]  Call: [phone]    For Gastroenterology referrals   [facility]   Clinics and Surgery Center, 4th Floor   [address] [place] [address]   Appointments: [phone]    WHERE TO GO FOR CARE?  Clinic    Make an appointment if you:       Are sick (cold, cough, flu, sore throat, earache or in pain).       Have a small injury (sprain, small cut, burn or broken bone).       Need a physical exam, Pap smear, vaccine or prescription refill.       Have questions about your health or medicines.    To reach us:      Call [phone] ([phone]). Open 24 hours every day. (For counseling services, call [phone].)    Log into Echo360 at [email or web address]. (Visit [email or web address] to create an account.) Hospital emergency room    An emergency is a serious or life- threatening problem that must be treated right away.    Call 587 or get to the hospital if you have:      Very bad or sudden:            - Chest pain or pressure         - Bleeding         - Head or belly pain         - Dizziness or trouble seeing, walking or                          Speaking      Problems breathing      Blood in your vomit or you are coughing up blood      A major injury (knocked out, loss of a finger or limb, rape, broken bone protruding from skin)    A mental health crisis. (Or call the Mental Health Crisis line at 1-630.351.9297 or Suicide Prevention Hotline at 1-248.865.7625.)    Open 24 hours every day. You don't need an appointment.     Urgent care    Visit urgent care for sickness or small injuries when the clinic is closed. You don't need an appointment. To check hours or find an urgent care near you,  visit [email or web address]. Online care    Get online care from [facility] for more than 70 common problems, like colds, allergies and infections. Open 24 hours every day at:   [email or web address]   Need help deciding?    For advice about where to be seen, you may call your clinic and ask to speak with a nurse. We're here for you 24 hours every day.         If you are deaf or hard of hearing, please let us know. We provide many free services including sign language interpreters, oral interpreters, TTYs, telephone amplifiers, note takers and written materials.

## 2017-12-08 NOTE — MR AVS SNAPSHOT
After Visit Summary   12/8/2017    Ghada Magana    MRN: [id number]           Patient Information     Date Of Birth          1935        Visit Information        Provider Department      12/8/2017 1:20 PM Chip Barlow PA-C [facility]        Today's Diagnoses     Mild cognitive impairment    -  1    Mild major depression (H)        Hypertension goal BP (blood pressure) < 140/90        Mycobacterium avium infection (H)          Care Instructions    [facility]   Discharged by : Yessenia Cheung MA  Paper scripts provided to patient : no   If you have any questions regarding to your visit please contact your care team:     Team Silver              Clinic Hours Telephone Number     Dr. Anupam Salguero PA-C   7am-7pm  Monday - Thursday   7am-5pm  Fridays  [phone]   (Appointment scheduling available 24/7)     RN Line  [phone] option 2     Urgent Care - [place] and [place] - 11am-9pm Monday-Friday Saturday-Sunday- 9am-5pm     [place] -   5pm-9pm Monday-Friday Saturday-Sunday- 9am-5pm    [phone] - [place]    [phone] - [place]     For a Price Quote for your services, please call our Consumer Price Line at [phone].     What options do I have for visits at the clinic other than the traditional office visit?     To expand how we care for you, many of our providers are utilizing electronic visits (e-visits) and telephone visits, when medically appropriate, for interactions with their patients rather than a visit in the clinic. We also offer nurse visits for many medical concerns. Just like any other service, we will bill your insurance company for this type of visit based on time spent on the phone with your provider. Not all insurance companies cover these visits. Please check with your medical insurance if this type of visit is covered. You will be  responsible for any charges that are not paid by your insurance.   E-visits via AddonTVhart: generally incur a $35.00 fee.     Telephone visits:   Time spent on the phone: *charged based on time that is spent on the phone in increments of 10 minutes. Estimated cost:   5-10 mins $30.00   11-20 mins. $59.00   21-30 mins. $85.00     Use AddonTVhart (secure email communication and access to your chart) to send your primary care provider a message or make an appointment. Ask someone on your Team how to sign up for SimpleTherapy.     As always, Thank you for trusting us with your health care needs!      [facility]:    Scheduling Appointments  [facility]  Call: [phone]    [facility] Z[facility]  Call: [phone]    [facility]  Call: [phone]    For Gastroenterology referrals   [facility]   Clinics and Surgery Center, 4th Floor   [address] [place] [address]   Appointments: [phone]    WHERE TO GO FOR CARE?  Clinic    Make an appointment if you:       Are sick (cold, cough, flu, sore throat, earache or in pain).       Have a small injury (sprain, small cut, burn or broken bone).       Need a physical exam, Pap smear, vaccine or prescription refill.       Have questions about your health or medicines.    To reach us:      Call [phone] ([phone]). Open 24 hours every day. (For counseling services, call [phone].)    Log into SimpleTherapy at [email or web address]. (Visit Live Youth Sports [email or web address] to create an account.) Hospital emergency room    An emergency is a serious or life- threatening problem that must be treated right away.    Call 361 or get to the hospital if you have:      Very bad or sudden:            - Chest pain or pressure         - Bleeding         - Head or belly pain         - Dizziness or trouble seeing, walking or                          Speaking      Problems breathing      Blood in  your vomit or you are coughing up blood      A major injury (knocked out, loss of a finger or limb, rape, broken bone protruding from skin)    A mental health crisis. (Or call the Mental Health Crisis line at 1-683.874.3569 or Suicide Prevention Hotline at 1-314.813.9940.)    Open 24 hours every day. You don't need an appointment.     Urgent care    Visit urgent care for sickness or small injuries when the clinic is closed. You don't need an appointment. To check hours or find an urgent care near you, visit [email or web address]. Online care    Get online care from DesignWine for more than 70 common problems, like colds, allergies and infections. Open 24 hours every day at:   [email or web address]   Need help deciding?    For advice about where to be seen, you may call your clinic and ask to speak with a nurse. We're here for you 24 hours every day.         If you are deaf or hard of hearing, please let us know. We provide many free services including sign language interpreters, oral interpreters, TTYs, telephone amplifiers, note takers and written materials.               Follow-ups after your visit        Your next 10 appointments already scheduled     Oct 10, 2018 11:00 AM CDT   Return Visit with Henrry De Anda MD   [facility] ([facility])    [address] [place] [address] [phone]              Who to contact     If you have questions or need follow up information about today's clinic visit or your schedule please contact [facility] directly at [phone].  Normal or non-critical lab and imaging results will be communicated to you by MyChart, letter or phone within 4 business days after the clinic has received the results. If you do not hear from us within 7 days, please contact the clinic through MyChart or phone. If you have a critical or abnormal lab result, we will notify you by phone as soon as possible.  Submit refill requests  "through [employer] or call your pharmacy and they will forward the refill request to us. Please allow 3 business days for your refill to be completed.          Additional Information About Your Visit        iGisticsharApaja Information     [employer] lets you send messages to your doctor, view your test results, renew your prescriptions, schedule appointments and more. To sign up, go to [email or web address] . Click on \"Log in\" on the left side of the screen, which will take you to the Welcome page. Then click on \"Sign up Now\" on the right side of the page.     You will be asked to enter the access code listed below, as well as some personal information. Please follow the directions to create your username and password.     Your access code is: [email or web address]  Expires: 2018  9:43 AM     Your access code will  in 90 days. If you need help or a new code, please call your [facility] clinic or [phone].        Care EveryWhere ID     This is your Care EveryWhere ID. This could be used by other organizations to access your [facility] medical records  [id number]        Your Vitals Were     Pulse Temperature Height BMI (Body Mass Index)          84 97.7  F (36.5  C) (Oral) 5' 3.5\" (1.613 m) 19.18 kg/m2         Blood Pressure from Last 3 Encounters:   17 130/62   10/27/17 110/56   10/11/17 128/64    Weight from Last 3 Encounters:   17 110 lb (49.9 kg)   10/27/17 113 lb (51.3 kg)   10/11/17 113 lb (51.3 kg)              Today, you had the following     No orders found for display         Today's Medication Changes          These changes are accurate as of: 17  2:07 PM.  If you have any questions, ask your nurse or doctor.               These medicines have changed or have updated prescriptions.        Dose/Directions    amLODIPine 5 MG tablet   Commonly known as:  NORVASC   This may have changed:  See the new instructions.   Used for:  Hypertension goal BP (blood pressure) < 140/90   Changed by:  Chip Barlow " [place] Candelaria        Dose:  5 mg   Take 1 tablet (5 mg) by mouth daily   Quantity:  90 tablet   Refills:  1       carvedilol 25 MG tablet   Commonly known as:  COREG   This may have changed:  See the new instructions.   Used for:  Hypertension goal BP (blood pressure) < 140/90   Changed by:  Chip Barlow PA-C        Dose:  25 mg   Take 1 tablet (25 mg) by mouth 2 times daily (with meals)   Quantity:  180 tablet   Refills:  1       losartan 100 MG tablet   Commonly known as:  COZAAR   This may have changed:  See the new instructions.   Used for:  Hypertension goal BP (blood pressure) < 140/90   Changed by:  Chip Barlow PA-C        Dose:  100 mg   Take 1 tablet (100 mg) by mouth daily   Quantity:  90 tablet   Refills:  1            Where to get your medicines      These medications were sent to [facility] [address] [facility] [address] [facility]  [address] [facility] [address]     Phone:  [phone]     amLODIPine 5 MG tablet    carvedilol 25 MG tablet    losartan 100 MG tablet                Primary Care Provider Office Phone # Fax #    Chip Barlow PA-C [phone]       [address] [facility] [address]        Equal Access to Services     MARLINE OLIVER AH: Hadii ron ku hadasho Soomaali, waaxda luqadaha, qaybta kaalmada adeegyada, waxay idiin haylolisn kathrin jefferson laandrew shelton. So [facility] [phone].    ATENCIÓN: Si habla español, tiene a dahl disposición servicios gratuitos de asistencia lingüística. Llame al [phone].    We comply with applicable federal civil rights laws and [place] laws. We do not discriminate on the basis of race, color, national origin, age, disability, sex, sexual orientation, or gender identity.            Thank you!     Thank you for choosing [facility]  for your care. Our goal is always to provide you with excellent care. Hearing back from our patients is one way we can continue to improve our services. Please  take a few minutes to complete the written survey that you may receive in the mail after your visit with us. Thank you!             Your Updated Medication List - Protect others around you: Learn how to safely use, store and throw away your medicines at www.disposemymeds.org.          This list is accurate as of: 12/8/17  2:07 PM.  Always use your most recent med list.                   Brand Name Dispense Instructions for use Diagnosis    amLODIPine 5 MG tablet    NORVASC    90 tablet    Take 1 tablet (5 mg) by mouth daily    Hypertension goal BP (blood pressure) < 140/90       aspirin 81 MG tablet     100    1 tab po QD (Once per day)        azithromycin 500 MG tablet    ZITHROMAX     500 mg oral three times a week for a month        CALCIUM 600 + D 600-200 MG-UNIT Tabs     0    2 per day.        carvedilol 25 MG tablet    COREG    180 tablet    Take 1 tablet (25 mg) by mouth 2 times daily (with meals)    Hypertension goal BP (blood pressure) < 140/90       ethambutol 400 MG tablet    MYAMBUTOL     1200 mg oral three times a week for a month        losartan 100 MG tablet    COZAAR    90 tablet    Take 1 tablet (100 mg) by mouth daily    Hypertension goal BP (blood pressure) < 140/90       Multi-vitamin Tabs tablet   Generic drug:  multivitamin, therapeutic with minerals      take one daily        rifampin 300 MG capsule    RIFADIN     600 mg oral three times a week for a month        sertraline 50 MG tablet    ZOLOFT    90 tablet    1/2 tab daily for 1 week, then 1 tab daily    Mild major depression (H)       thiamine mononitrate 100 MG Tabs     30 tablet    Take 100 mg by mouth daily    Thiamin deficiency       TYLENOL 325 MG tablet   Generic drug:  acetaminophen      2 TABLETS EVERY 4 HOURS AS NEEDED        UNKNOWN MED DOSAGE      timolol eye gtts one drop in each eye twice daily

## 2018-01-03 ENCOUNTER — TRANSFERRED RECORDS (OUTPATIENT)
Dept: HEALTH INFORMATION MANAGEMENT | Facility: CLINIC | Age: 83
End: 2018-01-03

## 2018-02-02 ENCOUNTER — OFFICE VISIT (OUTPATIENT)
Dept: FAMILY MEDICINE | Facility: CLINIC | Age: 83
End: 2018-02-02
Payer: MEDICARE

## 2018-02-02 VITALS
HEIGHT: 64 IN | BODY MASS INDEX: 18.61 KG/M2 | SYSTOLIC BLOOD PRESSURE: 134 MMHG | HEART RATE: 80 BPM | WEIGHT: 109 LBS | DIASTOLIC BLOOD PRESSURE: 76 MMHG | TEMPERATURE: 97.8 F

## 2018-02-02 DIAGNOSIS — F32.0 MILD MAJOR DEPRESSION (H): ICD-10-CM

## 2018-02-02 DIAGNOSIS — I10 HYPERTENSION GOAL BP (BLOOD PRESSURE) < 140/90: ICD-10-CM

## 2018-02-02 DIAGNOSIS — G31.84 MCI (MILD COGNITIVE IMPAIRMENT) WITH MEMORY LOSS: Primary | ICD-10-CM

## 2018-02-02 PROCEDURE — 99213 OFFICE O/P EST LOW 20 MIN: CPT | Performed by: PHYSICIAN ASSISTANT

## 2018-02-02 RX ORDER — LOSARTAN POTASSIUM 100 MG/1
100 TABLET ORAL DAILY
Qty: 90 TABLET | Refills: 1 | Status: SHIPPED | OUTPATIENT
Start: 2018-02-02 | End: 2018-08-23

## 2018-02-02 NOTE — PROGRESS NOTES
SUBJECTIVE:   Ghada Magana is a 82 year old female who presents to clinic today for the following health issues:    Follow up memory-see prior notes. Did see neurology/memory. Found to have some mild cognitive changes. Reports that she's stable. She is currently on sertraline as one of the suggested issues might have been mild depression. She doesn't particularly notice much difference. She still lives at home with her  and cares for one grandchild.  is around much of the time so she isn't alone. No risky behaviors, injuries have occurred.     Patient Active Problem List   Diagnosis     Congenital anomaly of lung     Osteoporosis     Anemia     Hyperlipidemia LDL goal <130     Hypertension goal BP (blood pressure) < 140/90     Advanced directives, counseling/discussion     Chronic cough     MCI (mild cognitive impairment) with memory loss     Mycobacterium avium infection (H)      Current Outpatient Prescriptions   Medication     losartan (COZAAR) 100 MG tablet     sertraline (ZOLOFT) 50 MG tablet     amLODIPine (NORVASC) 5 MG tablet     azithromycin (ZITHROMAX) 500 MG tablet     ethambutol (MYAMBUTOL) 400 MG tablet     rifampin (RIFADIN) 300 MG capsule     carvedilol (COREG) 25 MG tablet     thiamine mononitrate 100 MG TABS     TYLENOL 325 MG PO TABS     CALCIUM 600 + D 600-200 MG-UNIT OR TABS     UNKNOWN MED DOSAGE     MULTI-VITAMIN OR TABS     ASPIRIN 81 MG OR TABS     No current facility-administered medications for this visit.       Problem list and histories reviewed & adjusted, as indicated.  Additional history: as documented    Labs reviewed in EPIC    Reviewed and updated as needed this visit by clinical staff  Tobacco  Allergies  Meds  Med Hx  Surg Hx  Fam Hx  Soc Hx      Reviewed and updated as needed this visit by Provider         ROS:  Constitutional, HEENT, cardiovascular, pulmonary, gi and gu systems are negative, except as otherwise noted.    OBJECTIVE:     /76 (Cuff  "Size: Adult Regular)  Pulse 80  Temp 97.8  F (36.6  C) (Oral)  Ht 5' 3.5\" (1.613 m)  Wt 109 lb (49.4 kg)  BMI 19.01 kg/m2  Body mass index is 19.01 kg/(m^2).  GENERAL: healthy, alert and no distress  Psych: Appropriate appearance.  Alert and oriented times 3; coherent speech, normal   rate and volume, able to articulate logical thoughts, able   to abstract reason, no tangential thoughts, no hallucinations   or delusions.  Normal behavior.  Her affect is bright.      ASSESSMENT/PLAN:     (G31.84) MCI (mild cognitive impairment) with memory loss  (primary encounter diagnosis)  Comment:   Plan: Stable. Not better or worse - reviewed options. Family wants to monitor for now. Warning symptoms of worsening condition discussed and patient shows good understanding.     (I10) Hypertension goal BP (blood pressure) < 140/90  Comment:   Plan: losartan (COZAAR) 100 MG tablet        Refills given    (F32.0) Mild major depression (H)  Comment:   Plan: sertraline (ZOLOFT) 50 MG tablet        As noted. Continue sertraline for now. Will reassess in a few months. If cognition gets worse, refer back to neurology.     MERON EASON PA-C  [facility]  "

## 2018-02-02 NOTE — MR AVS SNAPSHOT
"              After Visit Summary   2/2/2018    Ghada Magana    MRN: [id number]           Patient Information     Date Of Birth          1935        Visit Information        Provider Department      2/2/2018 1:00 PM Chip Barlow PA-C [facility]        Today's Diagnoses     MCI (mild cognitive impairment) with memory loss    -  1    Hypertension goal BP (blood pressure) < 140/90        Mild major depression (H)           Follow-ups after your visit        Your next 10 appointments already scheduled     Oct 10, 2018 11:00 AM CDT   [facility]   [facility]    [address] [place] [address] [phone]              Who to contact     If you have questions or need follow up information about today's clinic visit or your schedule please contact [facility] directly at [phone].  Normal or non-critical lab and imaging results will be communicated to you by Sassorhart, letter or phone within 4 business days after the clinic has received the results. If you do not hear from us within 7 days, please contact the clinic through CRAZEt or phone. If you have a critical or abnormal lab result, we will notify you by phone as soon as possible.  Submit refill requests through Anxa or call your pharmacy and they will forward the refill request to us. Please allow 3 business days for your refill to be completed.          Additional Information About Your Visit        Sassorhart Information     Anxa lets you send messages to your doctor, view your test results, renew your prescriptions, schedule appointments and more. To sign up, go to [email or web address] . Click on \"Log in\" on the left side of the screen, which will take you to the Welcome page. Then click on \"Sign up Now\" on the right side of the page.     You will be asked to enter the access code listed below, as " "well as some personal information. Please follow the directions to create your username and password.     Your access code is: [email or web address]  Expires: 2018  2:54 PM     Your access code will  in 90 days. If you need help or a new code, please call your [facility] clinic or [phone].        Care EveryWhere ID     This is your Care EveryWhere ID. This could be used by other organizations to access your [facility] medical records  [id number]        Your Vitals Were     Pulse Temperature Height BMI (Body Mass Index)          80 97.8  F (36.6  C) (Oral) 5' 3.5\" (1.613 m) 19.01 kg/m2         Blood Pressure from Last 3 Encounters:   18 134/76   17 130/62   10/27/17 110/56    Weight from Last 3 Encounters:   18 109 lb (49.4 kg)   17 110 lb (49.9 kg)   10/27/17 113 lb (51.3 kg)              Today, you had the following     No orders found for display         Today's Medication Changes          These changes are accurate as of 18 11:59 PM.  If you have any questions, ask your nurse or doctor.               These medicines have changed or have updated prescriptions.        Dose/Directions    sertraline 50 MG tablet   Commonly known as:  ZOLOFT   This may have changed:  additional instructions   Used for:  Mild major depression (H)   Changed by:  Chip Barlow PA-C        1 tab daily   Quantity:  90 tablet   Refills:  1            Where to get your medicines      These medications were sent to [facility] [address] [place] [address]     Phone:  [phone]     losartan 100 MG tablet    sertraline 50 MG tablet                Primary Care Provider Office Phone # Fax #    Chip Barlow PA-C [phone]       [address] [facility] [address]        Equal Access to Services     MARLINE OLIVER AH: Hadii ron quan hadasho Soomaali, waaxda luqadaha, qaybta kaalmada kelsea, julieta pinon " daysimanuelariana vasquesHalieaan ah. So [facility] [phone].    ATENCIÓN: Si daniella janine, tiene a dahl disposición servicios gratuitos de asistencia lingüística. Tricia cisneros [phone].    We comply with applicable federal civil rights laws and [place] laws. We do not discriminate on the basis of race, color, national origin, age, disability, sex, sexual orientation, or gender identity.            Thank you!     Thank you for choosing [facility]  for your care. Our goal is always to provide you with excellent care. Hearing back from our patients is one way we can continue to improve our services. Please take a few minutes to complete the written survey that you may receive in the mail after your visit with us. Thank you!             Your Updated Medication List - Protect others around you: Learn how to safely use, store and throw away your medicines at www.disposemymeds.org.          This list is accurate as of 2/2/18 11:59 PM.  Always use your most recent med list.                   Brand Name Dispense Instructions for use Diagnosis    amLODIPine 5 MG tablet    NORVASC    90 tablet    Take 1 tablet (5 mg) by mouth daily    Hypertension goal BP (blood pressure) < 140/90       aspirin 81 MG tablet     100    1 tab po QD (Once per day)        azithromycin 500 MG tablet    ZITHROMAX     500 mg oral three times a week for a month        CALCIUM 600 + D 600-200 MG-UNIT Tabs     0    2 per day.        carvedilol 25 MG tablet    COREG    180 tablet    Take 1 tablet (25 mg) by mouth 2 times daily (with meals)    Hypertension goal BP (blood pressure) < 140/90       ethambutol 400 MG tablet    MYAMBUTOL     1200 mg oral three times a week for a month        losartan 100 MG tablet    COZAAR    90 tablet    Take 1 tablet (100 mg) by mouth daily    Hypertension goal BP (blood pressure) < 140/90       Multi-vitamin Tabs tablet   Generic drug:  multivitamin, therapeutic with minerals      take one daily        rifampin 300 MG  capsule    RIFADIN     600 mg oral three times a week for a month        sertraline 50 MG tablet    ZOLOFT    90 tablet    1 tab daily    Mild major depression (H)       thiamine mononitrate 100 MG Tabs     30 tablet    Take 100 mg by mouth daily    Thiamin deficiency       TYLENOL 325 MG tablet   Generic drug:  acetaminophen      2 TABLETS EVERY 4 HOURS AS NEEDED        UNKNOWN MED DOSAGE      timolol eye gtts one drop in each eye twice daily

## 2018-04-30 ENCOUNTER — TRANSFERRED RECORDS (OUTPATIENT)
Dept: HEALTH INFORMATION MANAGEMENT | Facility: CLINIC | Age: 83
End: 2018-04-30

## 2018-07-02 ENCOUNTER — TRANSFERRED RECORDS (OUTPATIENT)
Dept: HEALTH INFORMATION MANAGEMENT | Facility: CLINIC | Age: 83
End: 2018-07-02

## 2018-08-07 ENCOUNTER — TELEPHONE (OUTPATIENT)
Dept: FAMILY MEDICINE | Facility: CLINIC | Age: 83
End: 2018-08-07

## 2018-08-07 DIAGNOSIS — E51.9 THIAMIN DEFICIENCY: ICD-10-CM

## 2018-08-08 RX ORDER — GAUZE BANDAGE 2" X 2"
100 BANDAGE TOPICAL DAILY
Qty: 30 TABLET | Refills: 11 | Status: SHIPPED | OUTPATIENT
Start: 2018-08-08

## 2018-08-19 DIAGNOSIS — F32.0 MILD MAJOR DEPRESSION (H): ICD-10-CM

## 2018-08-20 NOTE — TELEPHONE ENCOUNTER
Griselda pham sent.  Spoke with patient to schedule appointment.  Patient seemed a bit confused, asked that I speak with her .  He states he has been wanting to get patient in for a visit.  RN assisted with scheduling an appointment for patient this Thursday.      Maria Elena Uriostegui RN

## 2018-08-20 NOTE — TELEPHONE ENCOUNTER
"Requested Prescriptions   Pending Prescriptions Disp Refills     sertraline (ZOLOFT) 50 MG tablet [Pharmacy Med Name: Sertraline HCl Oral Tablet 50 MG]  Last Written Prescription Date:  2/2/2018  Last Fill Quantity: 90 tablet,  # refills: 1   Last office visit: 2/2/2018 with prescribing provider:  CHAU Barlow   Future Office Visit:   Next 5 appointments (look out 90 days)     Oct 10, 2018 11:00 AM CDT   Return Visit with Henrry De Anda MD   [facility] ([facility])    [address] [place] [address] [phone]                  90 tablet 0     Sig: TAKE ONE TABLET BY MOUTH ONE TIME DAILY    SSRIs Protocol Failed    8/19/2018  5:49 PM       Failed - PHQ-9 score less than 5 in past 6 months    Please review last PHQ-9 score.     PHQ-9 SCORE:  No flowsheet data found.   SUREKHA-7 SCORE:  No flowsheet data found.         Failed - Recent (6 mo) or future (30 days) visit within the authorizing provider's specialty    Patient had office visit in the last 6 months or has a visit in the next 30 days with authorizing provider or within the authorizing provider's specialty.  See \"Patient Info\" tab in inbasket, or \"Choose Columns\" in Meds & Orders section of the refill encounter.           Passed - Patient is age 18 or older       Passed - No active pregnancy on record       Passed - No positive pregnancy test in last 12 months          "

## 2018-08-23 ENCOUNTER — OFFICE VISIT (OUTPATIENT)
Dept: FAMILY MEDICINE | Facility: CLINIC | Age: 83
End: 2018-08-23
Payer: MEDICARE

## 2018-08-23 VITALS
TEMPERATURE: 98.1 F | SYSTOLIC BLOOD PRESSURE: 100 MMHG | BODY MASS INDEX: 18.44 KG/M2 | DIASTOLIC BLOOD PRESSURE: 62 MMHG | WEIGHT: 108 LBS | HEIGHT: 64 IN | HEART RATE: 88 BPM

## 2018-08-23 DIAGNOSIS — R53.81 PHYSICAL DECONDITIONING: ICD-10-CM

## 2018-08-23 DIAGNOSIS — F32.0 MILD MAJOR DEPRESSION (H): ICD-10-CM

## 2018-08-23 DIAGNOSIS — I10 HYPERTENSION GOAL BP (BLOOD PRESSURE) < 140/90: ICD-10-CM

## 2018-08-23 DIAGNOSIS — R29.6 FALLS FREQUENTLY: Primary | ICD-10-CM

## 2018-08-23 DIAGNOSIS — S09.90XA INJURY OF HEAD, INITIAL ENCOUNTER: ICD-10-CM

## 2018-08-23 PROCEDURE — 99214 OFFICE O/P EST MOD 30 MIN: CPT | Performed by: PHYSICIAN ASSISTANT

## 2018-08-23 RX ORDER — SERTRALINE HYDROCHLORIDE 100 MG/1
100 TABLET, FILM COATED ORAL DAILY
Qty: 90 TABLET | Refills: 1 | Status: SHIPPED | OUTPATIENT
Start: 2018-08-23 | End: 2019-02-23

## 2018-08-23 RX ORDER — LOSARTAN POTASSIUM 100 MG/1
100 TABLET ORAL DAILY
Qty: 90 TABLET | Refills: 1 | Status: SHIPPED | OUTPATIENT
Start: 2018-08-23 | End: 2019-10-14

## 2018-08-23 NOTE — PROGRESS NOTES
"  SUBJECTIVE:   Ghada Magana is a 83 year old female who presents to clinic today for the following health issues:    Depression Followup    Status since last visit: Worsened     See PHQ-9 for current symptoms.  Other associated symptoms: None    Complicating factors:   Significant life event:  Yes  Current substance abuse:  None  Anxiety or Panic symptoms:  No    Feels isolated. Doesn't want to do things. Neeraj her  says she sits at home all day. Feels like she denies friends visits. Abbey says \"no one calls, no one stops by.\" She admits to feeling down. The sertraline isn't helping much right now    No flowsheet data found.    PHQ-9  English  PHQ-9   Any Language  Suicide Assessment Five-step Evaluation and Treatment (SAFE-T)    Amount of exercise or physical activity: None    Problems taking medications regularly: No    Medication side effects: none    Diet: regular (no restrictions)    She had a fall recently - not clear why or how. Probably stumbled in her back yard on some brick/granite. Denies a known loss of consciousness. Neeraj her  found her, cleaned her up. She has an abrasion on her right back head that is healing fine. NO headaches, no dizziness or confusion. She has not had vision changes. She's had a few balance / fall related challenges recently but otherwise feels that she's not had major issues.    Patient Active Problem List   Diagnosis     Congenital anomaly of lung     Osteoporosis     Anemia     Hyperlipidemia LDL goal <130     Hypertension goal BP (blood pressure) < 140/90     Advanced directives, counseling/discussion     Chronic cough     MCI (mild cognitive impairment) with memory loss     Mycobacterium avium infection (H)      Current Outpatient Prescriptions   Medication     amLODIPine (NORVASC) 5 MG tablet     ASPIRIN 81 MG OR TABS     CALCIUM 600 + D 600-200 MG-UNIT OR TABS     carvedilol (COREG) 25 MG tablet     losartan (COZAAR) 100 MG tablet     MULTI-VITAMIN OR TABS " "    sertraline (ZOLOFT) 100 MG tablet     sertraline (ZOLOFT) 50 MG tablet     thiamine mononitrate 100 MG TABS     TYLENOL 325 MG PO TABS     UNKNOWN MED DOSAGE     [DISCONTINUED] losartan (COZAAR) 100 MG tablet     No current facility-administered medications for this visit.         Problem list and histories reviewed & adjusted, as indicated.  Additional history: as documented    Labs reviewed in EPIC    Reviewed and updated as needed this visit by clinical staff       Reviewed and updated as needed this visit by Provider         ROS:  Constitutional, HEENT, cardiovascular, pulmonary, gi and gu systems are negative, except as otherwise noted.    OBJECTIVE:     /62 (Cuff Size: Adult Regular)  Pulse 88  Temp 98.1  F (36.7  C) (Oral)  Ht 5' 3.5\" (1.613 m)  Wt 108 lb (49 kg)  BMI 18.83 kg/m2  Body mass index is 18.83 kg/(m^2).  GENERAL: healthy, alert and no distress  EYES: Eyes grossly normal to inspection, PERRL and conjunctivae and sclerae normal  HENT: ear canals and TM's normal, nose and mouth without ulcers or lesions  NECK: no adenopathy, no asymmetry, masses, or scars and thyroid normal to palpation  RESP: lungs clear to auscultation - no rales, rhonchi or wheezes  CV: regular rate and rhythm, normal S1 S2, no S3 or S4, no murmur, click or rub, no peripheral edema and peripheral pulses strong  SKIN: Abrasions to the back of the right scalp and middle back are healing well  NEURO: Normal strength and tone, mentation intact and speech normal  PSYCH: mentation appears normal, affect normal/bright      ASSESSMENT/PLAN:       (R29.6) Falls frequently  (primary encounter diagnosis)  Comment:   Plan: Recommend PT. I See no injury or sign for need for acute imaging. Recommend monitor - CT if headaches, confusion, etc Will refer to PT for balance / reconditioning     (F32.0) Mild major depression (H)  Comment:   Plan: sertraline (ZOLOFT) 100 MG tablet        Reviewed options. I think she's isolating, fairly " depressed. Recommend increase to 100 mg. This prescription is given with a discussion of side effects, risks and proper use.  Instructions are given to follow up if not improving or symptoms change or worsen as discussed. Follow up in a few weeks.     (I10) Hypertension goal BP (blood pressure) < 140/90  Comment:   Plan: losartan (COZAAR) 100 MG tablet        Stable     (S09.90XA) Injury of head, initial encounter  Comment:   Plan: As noted     Follow up in 3-4 weeks, earlier if issues.     MERON EASON PA-C  [facility]

## 2018-08-23 NOTE — MR AVS SNAPSHOT
"              After Visit Summary   8/23/2018    Ghada Magana    MRN: [id number]           Patient Information     Date Of Birth          1935        Visit Information        Provider Department      8/23/2018 10:40 AM Chip Barlow PA-C [facility]        Today's Diagnoses     Falls frequently    -  1    Mild major depression (H)        Hypertension goal BP (blood pressure) < 140/90        Injury of head, initial encounter           Follow-ups after your visit        Your next 10 appointments already scheduled     Oct 10, 2018 11:00 AM CDT   Return Visit with Henrry De Anda MD   [facility]    [address] [place] [address] [phone]              Who to contact     If you have questions or need follow up information about today's clinic visit or your schedule please contact [facility] directly at [phone].  Normal or non-critical lab and imaging results will be communicated to you by MyChart, letter or phone within 4 business days after the clinic has received the results. If you do not hear from us within 7 days, please contact the clinic through MyChart or phone. If you have a critical or abnormal lab result, we will notify you by phone as soon as possible.  Submit refill requests through Farmainstant or call your pharmacy and they will forward the refill request to us. Please allow 3 business days for your refill to be completed.          Additional Information About Your Visit        Care EveryWhere ID     This is your Care EveryWhere ID. This could be used by other organizations to access your [facility] medical records  [id number]        Your Vitals Were     Pulse Temperature Height BMI (Body Mass Index)          88 98.1  F (36.7  C) (Oral) 5' 3.5\" (1.613 m) 18.83 kg/m2         Blood Pressure from Last 3 Encounters:   08/23/18 100/62   02/02/18 134/76   12/08/17 130/62    " Weight from Last 3 Encounters:   08/23/18 108 lb (49 kg)   02/02/18 109 lb (49.4 kg)   12/08/17 110 lb (49.9 kg)              Today, you had the following     No orders found for display         Today's Medication Changes          These changes are accurate as of 8/23/18 11:30 AM.  If you have any questions, ask your nurse or doctor.               These medicines have changed or have updated prescriptions.        Dose/Directions    * sertraline 50 MG tablet   Commonly known as:  ZOLOFT   This may have changed:  Another medication with the same name was added. Make sure you understand how and when to take each.   Used for:  Mild major depression (H)   Changed by:  Chip Barlow PA-C        TAKE ONE TABLET BY MOUTH ONE TIME DAILY   Quantity:  30 tablet   Refills:  0       * sertraline 100 MG tablet   Commonly known as:  ZOLOFT   This may have changed:  You were already taking a medication with the same name, and this prescription was added. Make sure you understand how and when to take each.   Used for:  Mild major depression (H)   Changed by:  Chip Barlow PA-C        Dose:  100 mg   Take 1 tablet (100 mg) by mouth daily   Quantity:  90 tablet   Refills:  1       * Notice:  This list has 2 medication(s) that are the same as other medications prescribed for you. Read the directions carefully, and ask your doctor or other care provider to review them with you.      Stop taking these medicines if you haven't already. Please contact your care team if you have questions.     ethambutol 400 MG tablet   Commonly known as:  MYAMBUTOL   Stopped by:  Chip Barlow PA-C           rifampin 300 MG capsule   Commonly known as:  RIFADIN   Stopped by:  Chip Barlow PA-C                Where to get your medicines      These medications were sent to [facility] [address] [place] [address]     Phone:  [phone]     losartan 100 MG tablet     sertraline 100 MG tablet                Primary Care Provider Office Phone # Fax #    Chip Bari Barlow PA-C [phone]       [address] [facility] [address]        Equal Access to Services     MARLINE OLIVER : Hadii ron quan neshao Soomaali, waaxda luqadaha, qaybta kaalmada adeegyada, julieta boudreauxn risamark jefferson vivienne shelton. So [facility] [phone].    ATENCIÓN: Si habla español, tiene a dahl disposición servicios gratuitos de asistencia lingüística. Llame al [phone].    We comply with applicable federal civil rights laws and [place] laws. We do not discriminate on the basis of race, color, national origin, age, disability, sex, sexual orientation, or gender identity.            Thank you!     Thank you for choosing [facility]  for your care. Our goal is always to provide you with excellent care. Hearing back from our patients is one way we can continue to improve our services. Please take a few minutes to complete the written survey that you may receive in the mail after your visit with us. Thank you!             Your Updated Medication List - Protect others around you: Learn how to safely use, store and throw away your medicines at www.disposemymeds.org.          This list is accurate as of 8/23/18 11:30 AM.  Always use your most recent med list.                   Brand Name Dispense Instructions for use Diagnosis    amLODIPine 5 MG tablet    NORVASC    90 tablet    Take 1 tablet (5 mg) by mouth daily    Hypertension goal BP (blood pressure) < 140/90       aspirin 81 MG tablet     100    1 tab po QD (Once per day)        CALCIUM 600 + D 600-200 MG-UNIT Tabs     0    2 per day.        carvedilol 25 MG tablet    COREG    180 tablet    Take 1 tablet (25 mg) by mouth 2 times daily (with meals)    Hypertension goal BP (blood pressure) < 140/90       losartan 100 MG tablet    COZAAR    90 tablet    Take 1 tablet (100 mg) by mouth daily    Hypertension goal BP (blood  pressure) < 140/90       Multi-vitamin Tabs tablet   Generic drug:  multivitamin, therapeutic with minerals      take one daily        * sertraline 50 MG tablet    ZOLOFT    30 tablet    TAKE ONE TABLET BY MOUTH ONE TIME DAILY    Mild major depression (H)       * sertraline 100 MG tablet    ZOLOFT    90 tablet    Take 1 tablet (100 mg) by mouth daily    Mild major depression (H)       thiamine mononitrate 100 MG Tabs     30 tablet    Take 100 mg by mouth daily    Thiamin deficiency       TYLENOL 325 MG tablet   Generic drug:  acetaminophen      2 TABLETS EVERY 4 HOURS AS NEEDED        UNKNOWN MED DOSAGE      timolol eye gtts one drop in each eye twice daily        * Notice:  This list has 2 medication(s) that are the same as other medications prescribed for you. Read the directions carefully, and ask your doctor or other care provider to review them with you.

## 2018-08-29 ENCOUNTER — THERAPY VISIT (OUTPATIENT)
Dept: PHYSICAL THERAPY | Facility: CLINIC | Age: 83
End: 2018-08-29
Payer: MEDICARE

## 2018-08-29 DIAGNOSIS — M54.2 CERVICALGIA: Primary | ICD-10-CM

## 2018-08-29 DIAGNOSIS — M54.6 ACUTE BILATERAL THORACIC BACK PAIN: ICD-10-CM

## 2018-08-29 PROCEDURE — 97110 THERAPEUTIC EXERCISES: CPT | Mod: GP | Performed by: PHYSICAL THERAPIST

## 2018-08-29 PROCEDURE — 97140 MANUAL THERAPY 1/> REGIONS: CPT | Mod: GP | Performed by: PHYSICAL THERAPIST

## 2018-08-29 PROCEDURE — 97161 PT EVAL LOW COMPLEX 20 MIN: CPT | Mod: GP | Performed by: PHYSICAL THERAPIST

## 2018-08-29 PROCEDURE — G8981 BODY POS CURRENT STATUS: HCPCS | Mod: GP | Performed by: PHYSICAL THERAPIST

## 2018-08-29 PROCEDURE — G8982 BODY POS GOAL STATUS: HCPCS | Mod: GP | Performed by: PHYSICAL THERAPIST

## 2018-08-29 NOTE — MR AVS SNAPSHOT
After Visit Summary   8/29/2018    Ghada Magana    MRN: [id number]           Patient Information     Date Of Birth          1935        Visit Information        Provider Department      8/29/2018 9:00 AM Mohsen Louie, PT [facility]        Today's Diagnoses     Cervicalgia    -  1    Acute bilateral thoracic back pain           Follow-ups after your visit        Your next 10 appointments already scheduled     Sep 07, 2018 12:40 PM CDT   J[facility] Spine with Mohsen Louie PT   [facility] [place] [facility]    [address] [facility] [id number] [phone]            Oct 10, 2018 11:00 AM CDT   Return Visit with Henrry De Anda MD   [facility] ([facility])    [address] [place] [id number] [phone]              Who to contact     If you have questions or need follow up information about today's clinic visit or your schedule please contact [facility] directly at [phone].  Normal or non-critical lab and imaging results will be communicated to you by MyChart, letter or phone within 4 business days after the clinic has received the results. If you do not hear from us within 7 days, please contact the clinic through MyChart or phone. If you have a critical or abnormal lab result, we will notify you by phone as soon as possible.  Submit refill requests through [facility] or call your pharmacy and they will forward the refill request to us. Please allow 3 business days for your refill to be completed.          Additional Information About Your Visit        Care EveryWhere ID     This is your Care EveryWhere ID. This could be used by other organizations to access your [place] medical records  [id number]         Blood Pressure from Last 3 Encounters:   08/23/18 100/62   02/02/18 134/76    12/08/17 130/62    Weight from Last 3 Encounters:   08/23/18 49 kg (108 lb)   02/02/18 49.4 kg (109 lb)   12/08/17 49.9 kg (110 lb)              We Performed the Following     HC PT EVAL, LOW COMPLEXITY     JL CERT REPORT     JL INITIAL EVAL REPORT     MANUAL THER TECH,1+REGIONS,EA 15 MIN     THERAPEUTIC EXERCISES        Primary Care Provider Office Phone # Fax #    Chip Barlow PA-C [phone]       [address] [place] [id number]        Equal Access to Services     [facility]: Hadii aad ku hadasho Soomaali, waaxda luqadaha, qaybta kaalmada adeegyada, waxay johny haylolisn kathrin palafox . So [facility] [phone].    ATENCIÓN: Si habla español, tiene a dahl disposición servicios gratuitos de asistencia lingüística. [facility] [phone].    We comply with applicable federal civil rights laws and [place] laws. We do not discriminate on the basis of race, color, national origin, age, disability, sex, sexual orientation, or gender identity.            Thank you!     Thank you for choosing [facility]  for your care. Our goal is always to provide you with excellent care. Hearing back from our patients is one way we can continue to improve our services. Please take a few minutes to complete the written survey that you may receive in the mail after your visit with us. Thank you!             Your Updated Medication List - Protect others around you: Learn how to safely use, store and throw away your medicines at www.disposemymeds.org.          This list is accurate as of 8/29/18 12:19 PM.  Always use your most recent med list.                   Brand Name Dispense Instructions for use Diagnosis    amLODIPine 5 MG tablet    NORVASC    90 tablet    Take 1 tablet (5 mg) by mouth daily    Hypertension goal BP (blood pressure) < 140/90       aspirin 81 MG tablet     100    1 tab po QD (Once per day)        CALCIUM 600 + D 600-200 MG-UNIT Tabs     0     2 per day.        carvedilol 25 MG tablet    COREG    180 tablet    Take 1 tablet (25 mg) by mouth 2 times daily (with meals)    Hypertension goal BP (blood pressure) < 140/90       losartan 100 MG tablet    COZAAR    90 tablet    Take 1 tablet (100 mg) by mouth daily    Hypertension goal BP (blood pressure) < 140/90       Multi-vitamin Tabs tablet   Generic drug:  multivitamin, therapeutic with minerals      take one daily        * sertraline 50 MG tablet    ZOLOFT    30 tablet    TAKE ONE TABLET BY MOUTH ONE TIME DAILY    Mild major depression (H)       * sertraline 100 MG tablet    ZOLOFT    90 tablet    Take 1 tablet (100 mg) by mouth daily    Mild major depression (H)       thiamine mononitrate 100 MG Tabs     30 tablet    Take 100 mg by mouth daily    Thiamin deficiency       TYLENOL 325 MG tablet   Generic drug:  acetaminophen      2 TABLETS EVERY 4 HOURS AS NEEDED        UNKNOWN MED DOSAGE      timolol eye gtts one drop in each eye twice daily        * Notice:  This list has 2 medication(s) that are the same as other medications prescribed for you. Read the directions carefully, and ask your doctor or other care provider to review them with you.

## 2018-08-29 NOTE — LETTER
DEPARTMENT OF HEALTH AND HUMAN SERVICES  CENTERS FOR MEDICARE & MEDICAID SERVICES    PLAN/UPDATED PLAN OF PROGRESS FOR OUTPATIENT REHABILITATION    PATIENTS NAME:  Ghada Magana   : 1935  PROVIDER NUMBER:    [id number]  HICN: [id number]   PROVIDER NAME: [facility]  MEDICAL RECORD NUMBER: [id number]   START OF CARE DATE:  SOC Date: 18   TYPE:  PT  PRIMARY/TREATMENT DIAGNOSIS: (Pertinent Medical Diagnosis)  Cervicalgia  Acute bilateral thoracic back pain  VISITS FROM START OF CARE: 1 Rxs Used: 1   [facility] Initial Evaluation  Subjective:  Patient is a 83 year old female presenting with rehab cervical spine hpi. The history is provided by the patient.   Ghada Magana is a 83 year old female with a cervical spine and thoracic spine condition.  Condition occurred with:  A fall/slip.  Condition occurred: at home.  This is a new condition  Few weeks ago.  Fell at home.  MD referral 2018..    Patient reports pain:  Central cervical spine, cervical left side, cervical right side, thoracic right side, thoracic left side, mid thoracic and upper thoracic.  Radiates to:  None.  Pain is described as aching and is constant and reported as 5/10.  Associated symptoms:  Loss of motion/stiffness. Pain is worse in the A.M. and worse during the day.  Symptoms are exacerbated by lying down, rotating head, certain positions and looking up or down and relieved by heat.  Since onset symptoms are gradually improving.        General health as reported by patient is fair.  Pertinent medical history includes:  Depression and high blood pressure.  Medical allergies: yes (ACE inhibitors).  Other surgeries include:  No.  Current medications:  Anti-depressants and high blood pressure medication.  Current occupation is Retired.  Lives in house with .      Red flags:  None as reported by the patient.  Objective:  Standing Alignment:     Cervical/Thoracic:  Forward head  Shoulder/UE:  Rounded shoulders  Cervical/Thoracic Evaluation  AROM:  AROM Cervical:  Flexion:          Full  Extension:       Decreased 50% with pain  Rotation:         Left: decreased 25% with pain     Right: full  Side Bend:      Left:     Right:   Headaches: none  Cervical Myotomes:  normal (denies neck sx with testing)  Cervical Palpation:  : hypertonicity.  Tenderness present at Left:    Rhomboids; Upper Trap; Levator and Erector Spinae  Tenderness present at Right:    Rhomboids; Upper Trap; Levator and Erector Spinae  General   ROS  Assessment/Plan:    Patient is a 83 year old female with cervical and thoracic complaints.    PATIENTS NAME:  Ghada Magana   : 1935        Patient has the following significant findings with corresponding treatment plan.                Diagnosis 1:  C/T pain  Pain -  manual therapy, self management, education, directional preference exercise and home program  Decreased ROM/flexibility - manual therapy and therapeutic exercise  Impaired muscle performance - neuro re-education  Decreased function - therapeutic activities      Therapy Evaluation Codes:   1) History comprised of:   Personal factors that impact the plan of care:      None.    Comorbidity factors that impact the plan of care are:      Depression and High blood pressure.     Medications impacting care: Anti-depressant and High blood pressure.  2) Examination of Body Systems comprised of:   Body structures and functions that impact the plan of care:      Cervical spine and Thoracic Spine.   Activity limitations that impact the plan of care are:      Lifting and Reading/Computer work.  3) Clinical presentation characteristics are:   Stable/Uncomplicated.  4) Decision-Making    Low complexity using standardized patient assessment instrument and/or measureable assessment of functional outcome.        Cumulative Therapy Evaluation is: Low complexity.  Previous and current  "functional limitations:  (See Goal Flow Sheet for this information)    Short term and Long term goals: (See Goal Flow Sheet for this information)   Communication ability:  Patient appears to be able to clearly communicate and understand verbal and written communication and follow directions correctly.  Treatment Explanation - The following has been discussed with the patient:   RX ordered/plan of care  Anticipated outcomes  Possible risks and side effects  This patient would benefit from PT intervention to resume normal activities.   Rehab potential is good.  Frequency:  1 X week, once daily  Duration:  for 6 weeks  Discharge Plan:  Achieve all LTG.  Independent in home treatment program.  Reach maximal therapeutic benefit.            PATIENTS NAME:  Ghada Magana   : 1935          Caregiver Signature/Credentials _____________________________ Date ________       Treating Provider: Mohsen Louie PT   I have reviewed and certified the need for these services and plan of treatment while under my care.      PHYSICIAN'S SIGNATURE:   _________________________________________  Date___________   Chip Barlow PA-C      Certification period:  Beginning of Cert date period: 18 to  End of Cert period date: 18     Functional Level Progress Report: Please see attached \"Goal Flow sheet for Functional level.\"    ____X____ Continue Services or       ________ DC Services                Service dates: From  SOC Date: 18 date to present                         "

## 2018-08-29 NOTE — PROGRESS NOTES
[facility] Initial Evaluation  Subjective:  Patient is a 83 year old female presenting with rehab cervical spine hpi. The history is provided by the patient.   Ghada Magana is a 83 year old female with a cervical spine and thoracic spine condition.  Condition occurred with:  A fall/slip.  Condition occurred: at home.  This is a new condition  Few weeks ago.  Fell at home.  MD referral 8/23/2018..    Patient reports pain:  Central cervical spine, cervical left side, cervical right side, thoracic right side, thoracic left side, mid thoracic and upper thoracic.  Radiates to:  None.  Pain is described as aching and is constant and reported as 5/10.  Associated symptoms:  Loss of motion/stiffness. Pain is worse in the A.M. and worse during the day.  Symptoms are exacerbated by lying down, rotating head, certain positions and looking up or down and relieved by heat.  Since onset symptoms are gradually improving.        General health as reported by patient is fair.  Pertinent medical history includes:  Depression and high blood pressure.  Medical allergies: yes (ACE inhibitors).  Other surgeries include:  No.  Current medications:  Anti-depressants and high blood pressure medication.  Current occupation is Retired.  Lives in house with .            Red flags:  None as reported by the patient.                        Objective:  Standing Alignment:    Cervical/Thoracic:  Forward head  Shoulder/UE:  Rounded shoulders                                  Cervical/Thoracic Evaluation    AROM:  AROM Cervical:    Flexion:          Full  Extension:       Decreased 50% with pain  Rotation:         Left: decreased 25% with pain     Right: full  Side Bend:      Left:     Right:       Headaches: none  Cervical Myotomes:  normal (denies neck sx with testing)                        Cervical Palpation:  : hypertonicity.  Tenderness present at Left:    Rhomboids; Upper Trap; Levator and Erector  Spinae  Tenderness present at Right:    Rhomboids; Upper Trap; Levator and Erector Spinae                                                  General     ROS    Assessment/Plan:    Patient is a 83 year old female with cervical and thoracic complaints.    Patient has the following significant findings with corresponding treatment plan.                Diagnosis 1:  C/T pain  Pain -  manual therapy, self management, education, directional preference exercise and home program  Decreased ROM/flexibility - manual therapy and therapeutic exercise  Impaired muscle performance - neuro re-education  Decreased function - therapeutic activities    Therapy Evaluation Codes:   1) History comprised of:   Personal factors that impact the plan of care:      None.    Comorbidity factors that impact the plan of care are:      Depression and High blood pressure.     Medications impacting care: Anti-depressant and High blood pressure.  2) Examination of Body Systems comprised of:   Body structures and functions that impact the plan of care:      Cervical spine and Thoracic Spine.   Activity limitations that impact the plan of care are:      Lifting and Reading/Computer work.  3) Clinical presentation characteristics are:   Stable/Uncomplicated.  4) Decision-Making    Low complexity using standardized patient assessment instrument and/or measureable assessment of functional outcome.  Cumulative Therapy Evaluation is: Low complexity.    Previous and current functional limitations:  (See Goal Flow Sheet for this information)    Short term and Long term goals: (See Goal Flow Sheet for this information)     Communication ability:  Patient appears to be able to clearly communicate and understand verbal and written communication and follow directions correctly.  Treatment Explanation - The following has been discussed with the patient:   RX ordered/plan of care  Anticipated outcomes  Possible risks and side effects  This patient would benefit  from PT intervention to resume normal activities.   Rehab potential is good.    Frequency:  1 X week, once daily  Duration:  for 6 weeks  Discharge Plan:  Achieve all LTG.  Independent in home treatment program.  Reach maximal therapeutic benefit.    Please refer to the daily flowsheet for treatment today, total treatment time and time spent performing 1:1 timed codes.

## 2018-08-30 ENCOUNTER — TELEPHONE (OUTPATIENT)
Dept: FAMILY MEDICINE | Facility: CLINIC | Age: 83
End: 2018-08-30

## 2018-08-30 NOTE — TELEPHONE ENCOUNTER
Forms received from  JL: Plan of Progress for Jacinto Barlow PA-C.  Forms placed in provider 'sign me' folder.  Please fax forms to [phone] after completion.    Thanks!  Aldair Guzman

## 2018-08-31 NOTE — TELEPHONE ENCOUNTER
Forms completed, signed, copy made for chart and faxed as requested.    Thanks!  Aldair Guzman

## 2018-09-04 ENCOUNTER — TELEPHONE (OUTPATIENT)
Dept: FAMILY MEDICINE | Facility: CLINIC | Age: 83
End: 2018-09-04

## 2018-09-04 DIAGNOSIS — I10 HYPERTENSION GOAL BP (BLOOD PRESSURE) < 140/90: ICD-10-CM

## 2018-09-04 RX ORDER — CARVEDILOL 25 MG/1
TABLET ORAL
Qty: 180 TABLET | Refills: 1 | Status: SHIPPED | OUTPATIENT
Start: 2018-09-04 | End: 2019-02-23

## 2018-09-04 NOTE — TELEPHONE ENCOUNTER
Other bp med was sent for 6 months. Prescription approved per [facility] Refill Protocol.  Nyasia Gonzalez RN

## 2018-09-04 NOTE — TELEPHONE ENCOUNTER
"Requested Prescriptions   Pending Prescriptions Disp Refills     carvedilol (COREG) 25 MG tablet [Pharmacy Med Name: Carvedilol Oral Tablet 25 MG]  Last Written Prescription Date:  12/8/2017  Last Fill Quantity: 180 tabs,  # refills: 1   Last office visit: 8/23/2018 with prescribing provider:  Cain   Future Office Visit:   Next 5 appointments (look out 90 days)     Oct 10, 2018 11:00 AM CDT   Return Visit with Henrry De Anda MD   [facility] ([facility])    [address] [place] [id number] [phone]                  180 tablet 0     Sig: Take 1 tablet (25 mg) by mouth 2 times daily (with meals)    Beta-Blockers Protocol Passed    9/4/2018 10:29 AM       Passed - Blood pressure under 140/90 in past 12 months    BP Readings from Last 3 Encounters:   08/23/18 100/62   02/02/18 134/76   12/08/17 130/62                Passed - Patient is age 6 or older       Passed - Recent (12 mo) or future (30 days) visit within the authorizing provider's specialty    Patient had office visit in the last 12 months or has a visit in the next 30 days with authorizing provider or within the authorizing provider's specialty.  See \"Patient Info\" tab in inbasket, or \"Choose Columns\" in Meds & Orders section of the refill encounter.              "

## 2018-09-04 NOTE — TELEPHONE ENCOUNTER
On 8/8/18 thiamine mononitrate 100 mg was signed by BRENDA Orozco to [facility] on [address].     Pt should have on file.    Not clear why this came to [facility] clinic.    Will route to BRENDA Orozco and he can route to his staff     Mary Walls, RN, BSN     .

## 2018-09-07 ENCOUNTER — THERAPY VISIT (OUTPATIENT)
Dept: PHYSICAL THERAPY | Facility: CLINIC | Age: 83
End: 2018-09-07
Payer: MEDICARE

## 2018-09-07 DIAGNOSIS — M54.2 CERVICALGIA: ICD-10-CM

## 2018-09-07 DIAGNOSIS — M54.6 ACUTE BILATERAL THORACIC BACK PAIN: ICD-10-CM

## 2018-09-07 PROCEDURE — 97110 THERAPEUTIC EXERCISES: CPT | Mod: GP | Performed by: PHYSICAL THERAPIST

## 2018-09-07 PROCEDURE — 97140 MANUAL THERAPY 1/> REGIONS: CPT | Mod: GP | Performed by: PHYSICAL THERAPIST

## 2018-09-19 ENCOUNTER — THERAPY VISIT (OUTPATIENT)
Dept: PHYSICAL THERAPY | Facility: CLINIC | Age: 83
End: 2018-09-19
Payer: MEDICARE

## 2018-09-19 DIAGNOSIS — E51.9 THIAMIN DEFICIENCY: ICD-10-CM

## 2018-09-19 DIAGNOSIS — M54.6 ACUTE BILATERAL THORACIC BACK PAIN: ICD-10-CM

## 2018-09-19 DIAGNOSIS — M54.2 CERVICALGIA: ICD-10-CM

## 2018-09-19 PROCEDURE — 97110 THERAPEUTIC EXERCISES: CPT | Mod: GP | Performed by: PHYSICAL THERAPIST

## 2018-09-19 PROCEDURE — 97140 MANUAL THERAPY 1/> REGIONS: CPT | Mod: GP | Performed by: PHYSICAL THERAPIST

## 2018-09-19 NOTE — TELEPHONE ENCOUNTER
thiamine mononitrate 100 MG TABS    Last Written Prescription Date:  8/8/2018  Last Fill Quantity: 30 tablet,   # refills: 11  Last Office Visit: 8/23/2018  Future Office visit:    Next 5 appointments (look out 90 days)     Oct 10, 2018 11:00 AM CDT   [facility]   [facility]    [address] [place] [id number] [phone]                   Routing refill request to provider for review/approval because:  Drug not on the [facility], [facility] or [facility] refill protocol or controlled substance

## 2018-09-20 RX ORDER — GAUZE BANDAGE 2" X 2"
100 BANDAGE TOPICAL DAILY
Qty: 0.01 TABLET | Refills: 0 | OUTPATIENT
Start: 2018-09-20

## 2018-09-21 NOTE — TELEPHONE ENCOUNTER
Refused Prescriptions:                       Disp   Refills    thiamine mononitrate 100 MG TABS           0.01 t*0        Sig: Take 100 mg by mouth daily  Refused By: MICHELA HAIDER  Reason for Refusal: Error      Closing encounter - no further actions needed at this time    Michela Haider RN

## 2018-10-01 ENCOUNTER — THERAPY VISIT (OUTPATIENT)
Dept: PHYSICAL THERAPY | Facility: CLINIC | Age: 83
End: 2018-10-01
Payer: MEDICARE

## 2018-10-01 DIAGNOSIS — M54.6 ACUTE BILATERAL THORACIC BACK PAIN: ICD-10-CM

## 2018-10-01 DIAGNOSIS — M54.2 CERVICALGIA: ICD-10-CM

## 2018-10-01 PROCEDURE — 97110 THERAPEUTIC EXERCISES: CPT | Mod: GP | Performed by: PHYSICAL THERAPIST

## 2018-10-01 PROCEDURE — 97140 MANUAL THERAPY 1/> REGIONS: CPT | Mod: GP | Performed by: PHYSICAL THERAPIST

## 2018-10-10 ENCOUNTER — OFFICE VISIT (OUTPATIENT)
Dept: NEUROLOGY | Facility: CLINIC | Age: 83
End: 2018-10-10
Payer: MEDICARE

## 2018-10-10 VITALS
HEART RATE: 74 BPM | WEIGHT: 104 LBS | RESPIRATION RATE: 16 BRPM | SYSTOLIC BLOOD PRESSURE: 108 MMHG | OXYGEN SATURATION: 95 % | TEMPERATURE: 98.2 F | DIASTOLIC BLOOD PRESSURE: 62 MMHG | BODY MASS INDEX: 18.13 KG/M2

## 2018-10-10 DIAGNOSIS — G31.84 MILD COGNITIVE IMPAIRMENT: Primary | ICD-10-CM

## 2018-10-10 PROCEDURE — 99214 OFFICE O/P EST MOD 30 MIN: CPT | Performed by: PSYCHIATRY & NEUROLOGY

## 2018-10-10 ASSESSMENT — MONTREAL COGNITIVE ASSESSMENT (MOCA)
10. [FLUENCY] NAME WORDS STARTING WITH DESIGNATED LETTER: 0
13. ORIENTATION SUBSCORE: 3
VISUOSPATIAL/EXECUTIVE SUBSCORE: 4
11. FOR EACH PAIR OF WORDS, WHAT CATEGORY DO THEY BELONG TO (OUT OF 2): 2
4. NAME EACH OF THE THREE ANIMALS SHOWN: 2
6. READ LIST OF DIGITS [FORWARD/BACKWARD]: 1
WHAT IS THE TOTAL SCORE (OUT OF 30): 13
9. REPEAT EACH SENTENCE: 0
WHAT LEVEL OF EDUCATION WAS ATTAINED: 1
8. SERIAL SUBTRACTION OF 7S: 0
7. [VIGILENCE] TAP WHEN HEARING DESIGNATED LETTER: 0
12. MEMORY INDEX SCORE: 0

## 2018-10-10 NOTE — PATIENT INSTRUCTIONS
AFTER VISIT SUMMARY (AVS):    At today's visit we thoroughly discussed current symptoms, results of the cognitive testing today, additionally needed evaluation/plan, which includes:  Orders Placed This Encounter   Procedures     NEUROPSYCHOLOGY REFERRAL     Additional recommendations after the work-up.    Next follow-up appointment is in the next 3 months or earlier if needed.    Please do not hesitate to call me with any questions or concerns.    Thanks.

## 2018-10-10 NOTE — MR AVS SNAPSHOT
After Visit Summary   10/10/2018    Ghada Magana    MRN: [id number]           Patient Information     Date Of Birth          1935        Visit Information        Provider Department      10/10/2018 11:00 AM Henrry De Anda MD [facility]        Today's Diagnoses     Mild cognitive impairment    -  1      Care Instructions    AFTER VISIT SUMMARY (AVS):    At today's visit we thoroughly discussed current symptoms, results of the cognitive testing today, additionally needed evaluation/plan, which includes:  Orders Placed This Encounter   Procedures     NEUROPSYCHOLOGY REFERRAL     Additional recommendations after the work-up.    Next follow-up appointment is in the next 3 months or earlier if needed.    Please do not hesitate to call me with any questions or concerns.    Thanks.           Follow-ups after your visit        Additional Services     NEUROPSYCHOLOGY REFERRAL       Your provider has referred you to:    [facility]: Adult Neuropsychology Clinic [facility] [phone] Preferred Provider: No preferred provider   [email or web address]    All scheduling is subject to the client's specific insurance plan & benefits, provider/location availability, and provider clinical specialities.  Please arrive 15 minutes early for your first appointment and bring your completed paperwork.    Please be aware that coverage of these services is subject to the terms and limitations of your health insurance plan.  Call member services at your health plan with any benefit or coverage questions.    Please bring the following to your appointment:  >>   Any x-rays, CTs or MRIs which have been performed.  Contact the facility where they were done to arrange for  prior to your scheduled appointment.  Any new CT, MRI or other procedures ordered by your specialist must be performed at a [place] facility or coordinated by your clinic's referral  office.    >>   List of current medications   >>   This referral request   >>   Any documents/labs given to you for this referral                  Follow-up notes from your care team     Return in about 3 months (around 1/10/2019).      Your next 10 appointments already scheduled     Oct 15, 2018 12:10 PM CDT   JL Spine with Mohsen Louie PT   [facility] ([facility])    [address] [facility] [address] [phone]            Jan 16, 2019 11:00 AM CST   Return Visit with Henrry De Anda MD   [facility] ([facility])    [address] [place] [address] [phone]              Who to contact     If you have questions or need follow up information about today's clinic visit or your schedule please contact [facility] directly at [phone].  Normal or non-critical lab and imaging results will be communicated to you by MyChart, letter or phone within 4 business days after the clinic has received the results. If you do not hear from us within 7 days, please contact the clinic through MyChart or phone. If you have a critical or abnormal lab result, we will notify you by phone as soon as possible.  Submit refill requests through [employer] or call your pharmacy and they will forward the refill request to us. Please allow 3 business days for your refill to be completed.          Additional Information About Your Visit        Care EveryWhere ID     This is your Care EveryWhere ID. This could be used by other organizations to access your [facility] medical records  [id number]        Your Vitals Were     Pulse Temperature Respirations Pulse Oximetry BMI (Body Mass Index)       74 98.2  F (36.8  C) (Oral) 16 95% 18.13 kg/m2        Blood Pressure from Last 3 Encounters:   10/10/18 108/62   08/23/18 100/62   02/02/18 134/76    Weight from Last 3 Encounters:   10/10/18 47.2 kg  (104 lb)   08/23/18 49 kg (108 lb)   02/02/18 49.4 kg (109 lb)              We Performed the Following     NEUROPSYCHOLOGY REFERRAL        Primary Care Provider Office Phone # Fax #    Chip Barlow PA-C [phone] [address] [address]        Equal Access to Services     [facility]: Hadii aad ku hadasho Soomaali, waaxda luqadaha, qaybta kaalmada adeegyada, waxay idiin hayaan adeeg khmanuelsh lazenonn . So [facility] [phone].    ATENCIÓN: Si habla español, tiene a dahl disposición servicios gratuitos de asistencia lingüística. Mansimariana al [phone].    We comply with applicable federal civil rights laws and [place] laws. We do not discriminate on the basis of race, color, national origin, age, disability, sex, sexual orientation, or gender identity.            Thank you!     Thank you for choosing [facility]  for your care. Our goal is always to provide you with excellent care. Hearing back from our patients is one way we can continue to improve our services. Please take a few minutes to complete the written survey that you may receive in the mail after your visit with us. Thank you!             Your Updated Medication List - Protect others around you: Learn how to safely use, store and throw away your medicines at www.disposemymeds.org.          This list is accurate as of 10/10/18 11:42 AM.  Always use your most recent med list.                   Brand Name Dispense Instructions for use Diagnosis    amLODIPine 5 MG tablet    NORVASC    90 tablet    Take 1 tablet (5 mg) by mouth daily    Hypertension goal BP (blood pressure) < 140/90       aspirin 81 MG tablet     100    1 tab po QD (Once per day)        CALCIUM 600 + D 600-200 MG-UNIT Tabs     0    2 per day.        carvedilol 25 MG tablet    COREG    180 tablet    Take 1 tablet (25 mg) by mouth 2 times daily (with meals)    Hypertension goal BP (blood pressure) < 140/90       losartan 100 MG tablet     COZAAR    90 tablet    Take 1 tablet (100 mg) by mouth daily    Hypertension goal BP (blood pressure) < 140/90       Multi-vitamin Tabs tablet   Generic drug:  multivitamin, therapeutic with minerals      take one daily        * sertraline 50 MG tablet    ZOLOFT    30 tablet    TAKE ONE TABLET BY MOUTH ONE TIME DAILY    Mild major depression (H)       * sertraline 100 MG tablet    ZOLOFT    90 tablet    Take 1 tablet (100 mg) by mouth daily    Mild major depression (H)       thiamine mononitrate 100 MG Tabs     30 tablet    Take 100 mg by mouth daily    Thiamin deficiency       TYLENOL 325 MG tablet   Generic drug:  acetaminophen      2 TABLETS EVERY 4 HOURS AS NEEDED        UNKNOWN MED DOSAGE      timolol eye gtts one drop in each eye twice daily        * Notice:  This list has 2 medication(s) that are the same as other medications prescribed for you. Read the directions carefully, and ask your doctor or other care provider to review them with you.

## 2018-10-10 NOTE — LETTER
10/10/2018         RE: Ghada Magana  [address] [facility] [address]        Dear Colleague,    Thank you for referring your patient, Ghada Magana, to the [facility]. Please see a copy of my visit note below.    ESTABLISHED PATIENT NEUROLOGY NOTE    DATE OF VISIT: 10/10/2018  CLINIC LOCATION: [facility]  MRN: [id number]  PATIENT NAME: Ghada Magana  YOB: 1935    PCP: MERON EASON PA-C    REASON FOR VISIT:   Chief Complaint   Patient presents with     Follow Up For     memory-no change     SUBJECTIVE:                                                      HISTORY OF PRESENT ILLNESS: Patient is here for follow up regarding cognitive complaints.  She was last seen on 10/11/2017.  At that time, one year follow-up was recommended, and vitamin B1 level was rechecked.  Please refer to my initial/other prior notes for further information.  The patient is accompanied by her , who participates in interview and serves as a collateral source of information.    Since the last visit the patient denies any significant memory worsening.  Her  adds that her problems are more related to mood than the memory.  No interval development of new neurological symptoms.  Her vitamin B1 level tested at the last visit was normal (83).  Fell twice, both time appeared to be mechanical falls.  Also reports increased lightheadedness when in upright position.    On review of systems, patient endorses no additional active complaints. Medications, allergies, family and social history were also reviewed. There are no changes reported by patient.  REVIEW OF SYSTEMS:                                                    10-system review was completed. Pertinent positives are included in HPI. The remainder of ROS is negative.  EXAM:                                                    Physical Exam:   Vitals: /62 (BP Location: Left arm, Patient Position:  Sitting, Cuff Size: Adult Regular)  Pulse 74  Temp 98.2  F (36.8  C) (Oral)  Resp 16  Wt 47.2 kg (104 lb)  SpO2 95%  BMI 18.13 kg/m2  Marco Cognitive Assessment:    Visuospatial/Executive : 4  Namin  Attention - Digits: 1  Attention - Letters: 0  Attention - Subtraction: 0  Language - Repeat: 0  Language - Fluency : 0  Abstraction: 2  Delayed Recall: 0  Orientation: 3  Education: 1  MOCA Score: 13  Administered by: : Rashaun Gale MA   Marco Cognitive Assessment Score:  1330.    General: pt is in NAD, cooperative.  Skin: normal turgor, moist mucous membranes, no lesions/rashes noticed.  HEENT: ATNC, white sclera, normal conjunctiva.  Respiratory: Symmetric lung excursion, no accessory respiratory muscle use.  Abdomen: Non distended.  Neurological: awake, cooperative, follows commands, no aphasia or dysarthria noted, cranial nerves II-XII: no ptosis, extraocular motility is full, face is symmetric, tongue is midline, equally moves all extremities, no dysmetria bilaterally, casual gait is normal.  DATA:   Labs: I personally reviewed the pertinent labs, as detailed in the history of present illness.  ASSESSMENT and PLAN:                                                    Assessment: 83-year-old female patient presents for the annual follow-up of cognitive complaints.  She denies any significant worsening of her symptoms, though her MoCA is 13/30 today compared to 15/30 on 2017.  In my opinion, the patient's presentation is consistent with mild cognitive impairment of amnestic type, and continued follow-up is needed given high chances that it could progress to Alzheimer's disease within the next 5 years.  I would like to repeat neuropsychologic testing to evaluate for interval changes/worsening.    Diagnoses:    ICD-10-CM    1. Mild cognitive impairment G31.84 NEUROPSYCHOLOGY REFERRAL     Plan: At today's visit we thoroughly discussed current symptoms, results of the cognitive testing today,  additionally needed evaluation/plan, which includes:  Orders Placed This Encounter   Procedures     NEUROPSYCHOLOGY REFERRAL     Additional recommendations after the work-up.    Next follow-up appointment is in the next 3 months or earlier if needed.    Total Time: 29 minutes with > 50% spent counseling the patient and her  on stated above assessment and recommendations, including nature of the diagnosis, needed w/u, and proposed plan.    Henrry De Anda MD  / Neurology         Again, thank you for allowing me to participate in the care of your patient.        Sincerely,        Henrry De Anda MD

## 2018-10-10 NOTE — PROGRESS NOTES
ESTABLISHED PATIENT NEUROLOGY NOTE    DATE OF VISIT: 10/10/2018  CLINIC LOCATION: [facility]  MRN: [id number]  PATIENT NAME: Ghada Magana  YOB: 1935    PCP: MERON EASON PA-C    REASON FOR VISIT:   Chief Complaint   Patient presents with     Follow Up For     memory-no change     SUBJECTIVE:                                                      HISTORY OF PRESENT ILLNESS: Patient is here for follow up regarding cognitive complaints.  She was last seen on 10/11/2017.  At that time, one year follow-up was recommended, and vitamin B1 level was rechecked.  Please refer to my initial/other prior notes for further information.  The patient is accompanied by her , who participates in interview and serves as a collateral source of information.    Since the last visit the patient denies any significant memory worsening.  Her  adds that her problems are more related to mood than the memory.  No interval development of new neurological symptoms.  Her vitamin B1 level tested at the last visit was normal (83).  Fell twice, both time appeared to be mechanical falls.  Also reports increased lightheadedness when in upright position.    On review of systems, patient endorses no additional active complaints. Medications, allergies, family and social history were also reviewed. There are no changes reported by patient.  REVIEW OF SYSTEMS:                                                    10-system review was completed. Pertinent positives are included in HPI. The remainder of ROS is negative.  EXAM:                                                    Physical Exam:   Vitals: /62 (BP Location: Left arm, Patient Position: Sitting, Cuff Size: Adult Regular)  Pulse 74  Temp 98.2  F (36.8  C) (Oral)  Resp 16  Wt 47.2 kg (104 lb)  SpO2 95%  BMI 18.13 kg/m2  Marco Cognitive Assessment:    Visuospatial/Executive : 4  Namin  Attention - Digits: 1  Attention - Letters:  0  Attention - Subtraction: 0  Language - Repeat: 0  Language - Fluency : 0  Abstraction: 2  Delayed Recall: 0  Orientation: 3  Education: 1  MOCA Score: 13  Administered by: : Rashaun Gale MA   Marco Cognitive Assessment Score:  13/30.    General: pt is in NAD, cooperative.  Skin: normal turgor, moist mucous membranes, no lesions/rashes noticed.  HEENT: ATNC, white sclera, normal conjunctiva.  Respiratory: Symmetric lung excursion, no accessory respiratory muscle use.  Abdomen: Non distended.  Neurological: awake, cooperative, follows commands, no aphasia or dysarthria noted, cranial nerves II-XII: no ptosis, extraocular motility is full, face is symmetric, tongue is midline, equally moves all extremities, no dysmetria bilaterally, casual gait is normal.  DATA:   Labs: I personally reviewed the pertinent labs, as detailed in the history of present illness.  ASSESSMENT and PLAN:                                                    Assessment: 83-year-old female patient presents for the annual follow-up of cognitive complaints.  She denies any significant worsening of her symptoms, though her MoCA is 13/30 today compared to 15/30 on 07/26/2017.  In my opinion, the patient's presentation is consistent with mild cognitive impairment of amnestic type, and continued follow-up is needed given high chances that it could progress to Alzheimer's disease within the next 5 years.  I would like to repeat neuropsychologic testing to evaluate for interval changes/worsening.    Diagnoses:    ICD-10-CM    1. Mild cognitive impairment G31.84 NEUROPSYCHOLOGY REFERRAL     Plan: At today's visit we thoroughly discussed current symptoms, results of the cognitive testing today, additionally needed evaluation/plan, which includes:  Orders Placed This Encounter   Procedures     NEUROPSYCHOLOGY REFERRAL     Additional recommendations after the work-up.    Next follow-up appointment is in the next 3 months or earlier if needed.    Total Time:  29 minutes with > 50% spent counseling the patient and her  on stated above assessment and recommendations, including nature of the diagnosis, needed w/u, and proposed plan.    Henrry De Anda MD  HP/ Neurology

## 2018-10-15 ENCOUNTER — TELEPHONE (OUTPATIENT)
Dept: FAMILY MEDICINE | Facility: CLINIC | Age: 83
End: 2018-10-15

## 2018-10-15 ENCOUNTER — THERAPY VISIT (OUTPATIENT)
Dept: PHYSICAL THERAPY | Facility: CLINIC | Age: 83
End: 2018-10-15
Payer: MEDICARE

## 2018-10-15 DIAGNOSIS — M54.6 ACUTE BILATERAL THORACIC BACK PAIN: ICD-10-CM

## 2018-10-15 DIAGNOSIS — M54.2 CERVICALGIA: ICD-10-CM

## 2018-10-15 PROCEDURE — G8982 BODY POS GOAL STATUS: HCPCS | Mod: GP | Performed by: PHYSICAL THERAPIST

## 2018-10-15 PROCEDURE — 97140 MANUAL THERAPY 1/> REGIONS: CPT | Mod: GP | Performed by: PHYSICAL THERAPIST

## 2018-10-15 PROCEDURE — G8983 BODY POS D/C STATUS: HCPCS | Mod: GP | Performed by: PHYSICAL THERAPIST

## 2018-10-15 PROCEDURE — 97110 THERAPEUTIC EXERCISES: CPT | Mod: GP | Performed by: PHYSICAL THERAPIST

## 2018-10-15 NOTE — PROGRESS NOTES
Subjective:  HPI                    Objective:  System    Physical Exam    General     ROS    Assessment/Plan:    DISCHARGE REPORT    Progress reporting period is from 8/29/2018 to 10/15/2018.       SUBJECTIVE  Subjective changes noted by patient:   Neck not too bad at night.  Pain comes and goes during the day.  Current pain level is : 4/10.     Previous pain level was  5/10.   Changes in function:  Yes (See Goal flowsheet attached for changes in current functional level)  Adverse reaction to treatment or activity: None    OBJECTIVE  Objective: Poor .    Tender L UT   FHP and FW shoulders continue    ASSESSMENT/PLAN  Updated problem list and treatment plan: Diagnosis 1:  C/T pain  Pain -  manual therapy, self management, education, directional preference exercise and home program  Decreased ROM/flexibility - manual therapy and therapeutic exercise  Impaired muscle performance - neuro re-education  Decreased function - therapeutic activities  STG/LTGs have been met or progress has been made towards goals:  Yes (See Goal flow sheet completed today.)  Assessment of Progress: The patient's condition has potential to improve.  Patient is meeting short term goals and is progressing towards long term goals.  Self Management Plans:  Patient has been instructed in a home treatment program.    Ghada continues to require the following intervention to meet STG and LTG's:  PT intervention is no longer required to meet STG/LTG.    Recommendations:  This patient is ready to be discharged from therapy and continue their home treatment program.  Instructed pt to F/U with MD.    Please refer to the daily flowsheet for treatment today, total treatment time and time spent performing 1:1 timed codes.

## 2018-10-15 NOTE — TELEPHONE ENCOUNTER
Patient/family was instructed to return call to [facility] RN directly on the RN Call back line at [phone].     Aldair Leach RN

## 2018-10-15 NOTE — TELEPHONE ENCOUNTER
Reason for Call:  Same Day Appointment, Requested Provider:  Chip Barlow PA-C    PCP: Chip Barlow    Reason for visit: Patient has low blood Pressure and getting dizzy, memory issues,     Duration of symptoms: 2 weeks    Have you been treated for this in the past? Yes    Additional comments: Patient is scheduled to be seen 10/25/18, but patients  is hoping to get her in sooner    Can we leave a detailed message on this number? YES    Phone number patient can be reached at: Home number on file [phone] (home)    Best Time: anytime    Call taken on 10/15/2018 at 1:11 PM by Kely Beard

## 2018-10-15 NOTE — MR AVS SNAPSHOT
After Visit Summary   10/15/2018    Ghada Magana    MRN: [id number]           Patient Information     Date Of Birth          1935        Visit Information        Provider Department      10/15/2018 12:10 PM Mohsen Louie, PT [facility]        Today's Diagnoses     Cervicalgia        Acute bilateral thoracic back pain           Follow-ups after your visit        Your next 10 appointments already scheduled     Nov 02, 2018 12:30 PM CDT   (Arrive by 12:15 PM)   Neuropsych Eval with Mikey Fisher, PhD [facility] Neuropsychology ([facility])    [address] [id number] [phone]            Jan 16, 2019 11:00 AM CST   Return Visit with Henrry De Anda MD   [facility] ([facility])    [address] [place] [address] [phone]              Who to contact     If you have questions or need follow up information about today's clinic visit or your schedule please contact [facility] directly at [phone].  Normal or non-critical lab and imaging results will be communicated to you by MyChart, letter or phone within 4 business days after the clinic has received the results. If you do not hear from us within 7 days, please contact the clinic through MyChart or phone. If you have a critical or abnormal lab result, we will notify you by phone as soon as possible.  Submit refill requests through [employer] or call your pharmacy and they will forward the refill request to us. Please allow 3 business days for your refill to be completed.          Additional Information About Your Visit        Care EveryWhere ID     This is your Care EveryWhere ID. This could be used by other organizations to access your [facility] medical records  [id number]         Blood Pressure from Last 3 Encounters:    10/10/18 108/62   08/23/18 100/62   02/02/18 134/76    Weight from Last 3 Encounters:   10/10/18 47.2 kg (104 lb)   08/23/18 49 kg (108 lb)   02/02/18 49.4 kg (109 lb)              We Performed the Following     JL PROGRESS NOTES REPORT     MANUAL THER TECH,1+REGIONS,EA 15 MIN     THERAPEUTIC EXERCISES        Primary Care Provider Office Phone # Fax #    Chip Barlow PA-C [phone]       [address] [place] [address]        Equal Access to Services     [facility]: Hadii aad ku hadasho Soomaali, waaxda luqadaha, qaybta kaalmada adeegyada, julieta mcclain hayaan ademark palafox . So [facility] [phone].    ATENCIÓN: Si habla español, tiene a dahl disposición servicios gratuitos de asistencia lingüística. [facility] [phone].    We comply with applicable federal civil rights laws and [place] laws. We do not discriminate on the basis of race, color, national origin, age, disability, sex, sexual orientation, or gender identity.            Thank you!     Thank you for choosing [facility]  for your care. Our goal is always to provide you with excellent care. Hearing back from our patients is one way we can continue to improve our services. Please take a few minutes to complete the written survey that you may receive in the mail after your visit with us. Thank you!             Your Updated Medication List - Protect others around you: Learn how to safely use, store and throw away your medicines at www.disposemymeds.org.          This list is accurate as of 10/15/18 12:57 PM.  Always use your most recent med list.                   Brand Name Dispense Instructions for use Diagnosis    amLODIPine 5 MG tablet    NORVASC    90 tablet    Take 1 tablet (5 mg) by mouth daily    Hypertension goal BP (blood pressure) < 140/90       aspirin 81 MG tablet     100    1 tab po QD (Once per day)        CALCIUM 600 + D 600-200 MG-UNIT Tabs     0    2 per  day.        carvedilol 25 MG tablet    COREG    180 tablet    Take 1 tablet (25 mg) by mouth 2 times daily (with meals)    Hypertension goal BP (blood pressure) < 140/90       losartan 100 MG tablet    COZAAR    90 tablet    Take 1 tablet (100 mg) by mouth daily    Hypertension goal BP (blood pressure) < 140/90       Multi-vitamin Tabs tablet   Generic drug:  multivitamin, therapeutic with minerals      take one daily        * sertraline 50 MG tablet    ZOLOFT    30 tablet    TAKE ONE TABLET BY MOUTH ONE TIME DAILY    Mild major depression (H)       * sertraline 100 MG tablet    ZOLOFT    90 tablet    Take 1 tablet (100 mg) by mouth daily    Mild major depression (H)       thiamine mononitrate 100 MG Tabs     30 tablet    Take 100 mg by mouth daily    Thiamin deficiency       TYLENOL 325 MG tablet   Generic drug:  acetaminophen      2 TABLETS EVERY 4 HOURS AS NEEDED        UNKNOWN MED DOSAGE      timolol eye gtts one drop in each eye twice daily        * Notice:  This list has 2 medication(s) that are the same as other medications prescribed for you. Read the directions carefully, and ask your doctor or other care provider to review them with you.

## 2018-10-16 NOTE — TELEPHONE ENCOUNTER
Patient is calling to discuss why she has an appointment.  She states she did not know anything about it.  Call was disconnected.  Please call  [phone]     Meredith Drubin  Patient Representative

## 2018-10-23 NOTE — TELEPHONE ENCOUNTER
"Ghada Magana is a 83 year old female who calls with dizziness.    NURSING ASSESSMENT:  Description:  Called and spoke with patient's . He isn't sure how long she's had the dizziness, or how often since she has issues with her memory and verbalizing things. She hasn't mentioned chest pain, weakness, numbness, difficulty speaking or walking, new confusion, fainting, change in heart rate/rhythm, headaches, vision changes, fever, recent head trauma, vomiting, diarrhea, bleeding, earache, ringing in ears, loss of hearing. He's checked her BP and couldn't recall what it was but that it was low.   Onset/duration:  unknown  Precip. factors:  n/a  Associated symptoms:  See description  Improves/worsens symptoms:  n/a  Pain scale (0-10)   0/10  Allergies:   Allergies   Allergen Reactions     Ace Inhibitors      Cough     Hctz      Increased nightime urination       NURSING PLAN: Nursing advice to patient see provider in 24 hours    RECOMMENDED DISPOSITION:  See in 24 hours   Will comply with recommendation: No- Barriers to comply with plan of care wants to see PCP, appt is scheduled for Thursday.  If further questions/concerns or if symptoms do not improve, worsen or new symptoms develop, call your PCP or [facility] as soon as possible.      Guideline used:  Telephone Triage Protocols for Nurses, Fifth Edition, Monica Arita \"dizziness\" and \"hypotension\"    Aldair Leach RN    "

## 2018-10-25 ENCOUNTER — OFFICE VISIT (OUTPATIENT)
Dept: FAMILY MEDICINE | Facility: CLINIC | Age: 83
End: 2018-10-25
Payer: MEDICARE

## 2018-10-25 VITALS
HEART RATE: 76 BPM | HEIGHT: 64 IN | BODY MASS INDEX: 17.42 KG/M2 | WEIGHT: 102 LBS | SYSTOLIC BLOOD PRESSURE: 80 MMHG | DIASTOLIC BLOOD PRESSURE: 42 MMHG | TEMPERATURE: 98.1 F

## 2018-10-25 DIAGNOSIS — Z23 NEED FOR PROPHYLACTIC VACCINATION AND INOCULATION AGAINST INFLUENZA: ICD-10-CM

## 2018-10-25 DIAGNOSIS — E55.9 VITAMIN D DEFICIENCY: ICD-10-CM

## 2018-10-25 DIAGNOSIS — R63.4 LOSS OF WEIGHT: Primary | ICD-10-CM
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
## 2018-10-25 DIAGNOSIS — R82.90 ABNORMAL URINALYSIS: ICD-10-CM

## 2018-10-25 DIAGNOSIS — R63.0 NO APPETITE: ICD-10-CM

## 2018-10-25 DIAGNOSIS — I95.9 HYPOTENSION, UNSPECIFIED HYPOTENSION TYPE: ICD-10-CM

## 2018-10-25 DIAGNOSIS — R42 DIZZINESS: ICD-10-CM

## 2018-10-25 DIAGNOSIS — Z86.2 HISTORY OF ANEMIA: ICD-10-CM

## 2018-10-25 DIAGNOSIS — G47.00 INSOMNIA, UNSPECIFIED TYPE: ICD-10-CM

## 2018-10-25 DIAGNOSIS — R41.3 MEMORY PROBLEM: ICD-10-CM

## 2018-10-25 LAB
ALBUMIN UR-MCNC: 30 MG/DL
APPEARANCE UR: CLEAR
BACTERIA #/AREA URNS HPF: ABNORMAL /HPF
BILIRUB UR QL STRIP: NEGATIVE
COLOR UR AUTO: YELLOW
ERYTHROCYTE [DISTWIDTH] IN BLOOD BY AUTOMATED COUNT: 13.9 % (ref 10–15)
GLUCOSE UR STRIP-MCNC: NEGATIVE MG/DL
HCT VFR BLD AUTO: 33.1 % (ref 35–47)
HGB BLD-MCNC: 10.6 G/DL (ref 11.7–15.7)
HGB UR QL STRIP: NEGATIVE
KETONES UR STRIP-MCNC: NEGATIVE MG/DL
LEUKOCYTE ESTERASE UR QL STRIP: ABNORMAL
MCH RBC QN AUTO: 30.1 PG (ref 26.5–33)
MCHC RBC AUTO-ENTMCNC: 32 G/DL (ref 31.5–36.5)
MCV RBC AUTO: 94 FL (ref 78–100)
NITRATE UR QL: NEGATIVE
NON-SQ EPI CELLS #/AREA URNS LPF: ABNORMAL /LPF
PH UR STRIP: 6.5 PH (ref 5–7)
PLATELET # BLD AUTO: 412 10E9/L (ref 150–450)
RBC # BLD AUTO: 3.52 10E12/L (ref 3.8–5.2)
RBC #/AREA URNS AUTO: ABNORMAL /HPF
SOURCE: ABNORMAL
SP GR UR STRIP: 1.02 (ref 1–1.03)
UROBILINOGEN UR STRIP-ACNC: 0.2 EU/DL (ref 0.2–1)
VIT B12 SERPL-MCNC: 942 PG/ML (ref 193–986)
WBC # BLD AUTO: 10.5 10E9/L (ref 4–11)
WBC #/AREA URNS AUTO: ABNORMAL /HPF

## 2018-10-25 PROCEDURE — 80053 COMPREHEN METABOLIC PANEL: CPT | Performed by: PHYSICIAN ASSISTANT

## 2018-10-25 PROCEDURE — 99214 OFFICE O/P EST MOD 30 MIN: CPT | Mod: 25 | Performed by: PHYSICIAN ASSISTANT

## 2018-10-25 PROCEDURE — G0008 ADMIN INFLUENZA VIRUS VAC: HCPCS | Performed by: PHYSICIAN ASSISTANT

## 2018-10-25 PROCEDURE — 81001 URINALYSIS AUTO W/SCOPE: CPT | Performed by: PHYSICIAN ASSISTANT

## 2018-10-25 PROCEDURE — 36415 COLL VENOUS BLD VENIPUNCTURE: CPT | Performed by: PHYSICIAN ASSISTANT

## 2018-10-25 PROCEDURE — 87086 URINE CULTURE/COLONY COUNT: CPT | Performed by: PHYSICIAN ASSISTANT

## 2018-10-25 PROCEDURE — 82607 VITAMIN B-12: CPT | Performed by: PHYSICIAN ASSISTANT

## 2018-10-25 PROCEDURE — 82306 VITAMIN D 25 HYDROXY: CPT | Performed by: PHYSICIAN ASSISTANT

## 2018-10-25 PROCEDURE — 84443 ASSAY THYROID STIM HORMONE: CPT | Performed by: PHYSICIAN ASSISTANT

## 2018-10-25 PROCEDURE — 90662 IIV NO PRSV INCREASED AG IM: CPT | Performed by: PHYSICIAN ASSISTANT

## 2018-10-25 PROCEDURE — 85027 COMPLETE CBC AUTOMATED: CPT | Performed by: PHYSICIAN ASSISTANT

## 2018-10-25 PROCEDURE — 82728 ASSAY OF FERRITIN: CPT | Performed by: PHYSICIAN ASSISTANT

## 2018-10-25 RX ORDER — MIRTAZAPINE 15 MG/1
15 TABLET, FILM COATED ORAL AT BEDTIME
Qty: 30 TABLET | Refills: 1 | Status: SHIPPED | OUTPATIENT
Start: 2018-10-25 | End: 2018-11-15

## 2018-10-25 NOTE — LETTER
November 2, 2018      Ghada Magana  [address] [facility] [address]        Dear Ghada,       Overall, your labs look good. You are a bit anemic and your sodium (salt) level is mildly low. Low sodium can make you a bit tired and weak. I'd suggest you add a little bit of salt to your diet. We can discuss these results when I see you.     Generally, things appear fine otherwise.     Let me know if you have questions right now.       Sincerely,        MERON EASON PA-C    Results for orders placed or performed in visit on 10/25/18   Comprehensive metabolic panel (BMP + Alb, Alk Phos, ALT, AST, Total. Bili, TP)   Result Value Ref [facility]    Sodium 132 (L) 133 - 144 mmol/L    Potassium 4.5 3.4 - 5.3 mmol/L    Chloride 98 94 - 109 mmol/L    Carbon Dioxide 26 20 - 32 mmol/L    Anion Gap 8 3 - 14 mmol/L    Glucose 95 70 - 99 mg/dL    Urea Nitrogen 19 7 - 30 mg/dL    Creatinine 0.77 0.52 - 1.04 mg/dL    GFR Estimate 71 >60 mL/min/1.7m2    GFR Estimate If Black 86 >60 mL/min/1.7m2    Calcium 9.8 8.5 - 10.1 mg/dL    Bilirubin Total 0.5 0.2 - 1.3 mg/dL    Albumin 3.5 3.4 - 5.0 g/dL    Protein Total 8.4 6.8 - 8.8 g/dL    Alkaline Phosphatase 97 40 - 150 U/L    ALT 25 0 - 50 U/L    AST 31 0 - 45 U/L   CBC with platelets   Result Value Ref [facility]    WBC 10.5 4.0 - 11.0 10e9/L    RBC Count 3.52 (L) 3.8 - 5.2 10e12/L    Hemoglobin 10.6 (L) 11.7 - 15.7 g/dL    Hematocrit 33.1 (L) 35.0 - 47.0 %    MCV 94 78 - 100 fl    MCH 30.1 26.5 - 33.0 pg    MCHC 32.0 31.5 - 36.5 g/dL    RDW 13.9 10.0 - 15.0 %    Platelet Count 412 150 - 450 10e9/L   *UA reflex to Microscopic and Culture ([facility] (except [place] and [place])   Result Value Ref [facility]    Color Urine Yellow     Appearance Urine Clear     Glucose Urine Negative NEG^Negative mg/dL    Bilirubin Urine Negative NEG^Negative    Ketones Urine Negative NEG^Negative mg/dL    Specific Gravity Urine 1.020 1.003 - 1.035    Blood Urine Negative  NEG^Negative    pH Urine 6.5 5.0 - 7.0 pH    Protein Albumin Urine 30 (A) NEG^Negative mg/dL    Urobilinogen Urine 0.2 0.2 - 1.0 EU/dL    Nitrite Urine Negative NEG^Negative    Leukocyte Esterase Urine Trace (A) NEG^Negative    Source Urine    Vitamin B12   Result Value Ref [facility]    Vitamin B12 942 193 - 986 pg/mL   Vitamin D Deficiency   Result Value Ref [facility]    Vitamin D Deficiency screening 71 20 - 75 ug/L   Ferritin   Result Value Ref [facility]    Ferritin 84 8 - 252 ng/mL   TSH   Result Value Ref [facility]    TSH 2.38 0.40 - 4.00 mU/L   Urine Microscopic   Result Value Ref [facility]    WBC Urine 5-10 (A) OTO5^0 - 5 /HPF    RBC Urine O - 2 OTO2^O - 2 /HPF    Squamous Epithelial /LPF Urine Many (A) FEW^Few /LPF    Bacteria Urine Many (A) NEG^Negative /HPF   Urine Culture Aerobic Bacterial   Result Value Ref [facility]    Specimen Description Midstream Urine     Culture Micro >100,000 colonies/mL  mixed urogenital lashell

## 2018-10-25 NOTE — PATIENT INSTRUCTIONS
1. Lower your Losartan (Cozaar) to 50 mgs daily - do 1/2 tablet of the 100 mg for now.  2. Check labs today   3. Start new medication (mirtzazapine) 1 pill in evenings - helps increase appetite, helps with sleep, helps with mood   4. Recheck with me 3-4 weeks

## 2018-10-25 NOTE — PROGRESS NOTES

## 2018-10-25 NOTE — MR AVS SNAPSHOT
After Visit Summary   10/25/2018    Ghada Magana    MRN: [id number]           Patient Information     Date Of Birth          1935        Visit Information        Provider Department      10/25/2018 9:40 AM Cihp Barlow PA-C [facility]        Today's Diagnoses     Loss of weight    -  1    Hypotension, unspecified hypotension type        Fall, initial encounter        Dizziness        Memory problem        History of anemia        Vitamin D deficiency         No appetite        Insomnia, unspecified type          Care Instructions    1. Lower your Losartan (Cozaar) to 50 mgs daily - do 1/2 tablet of the 100 mg for now.  2. Check labs today   3. Start new medication (mirtzazapine) 1 pill in evenings - helps increase appetite, helps with sleep, helps with mood   4. Recheck with me 3-4 weeks             Follow-ups after your visit        Your next 10 appointments already scheduled     Nov 02, 2018 12:30 PM CDT   (Arrive by 12:15 PM)   Neuropsych Eval with Mikey Fisher, PhD [facility] Neuro[facility] ([facility])    [address] [address] [phone]            Jan 16, 2019 11:00 AM CST   Return Visit with Henrry De Anda MD   [facility] ([facility])    [address] [place] [address] [phone]              Who to contact     If you have questions or need follow up information about today's clinic visit or your schedule please contact [facility] directly at [phone].  Normal or non-critical lab and imaging results will be communicated to you by MyChart, letter or phone within 4 business days after the clinic has received the results. If you do not hear from us within 7 days, please contact the clinic through MyChart or phone. If you have a critical or abnormal lab result, we will notify you by phone as soon  "as possible.  Submit refill requests through [employer] or call your pharmacy and they will forward the refill request to us. Please allow 3 business days for your refill to be completed.          Additional Information About Your Visit        Care EveryWhere ID     This is your Care EveryWhere ID. This could be used by other organizations to access your [facility] medical records  [id number]        Your Vitals Were     Pulse Temperature Height BMI (Body Mass Index)          76 98.1  F (36.7  C) (Oral) 5' 3.5\" (1.613 m) 17.79 kg/m2         Blood Pressure from Last 3 Encounters:   10/25/18 (!) 80/42   10/10/18 108/62   08/23/18 100/62    Weight from Last 3 Encounters:   10/25/18 102 lb (46.3 kg)   10/10/18 104 lb (47.2 kg)   08/23/18 108 lb (49 kg)              We Performed the Following     *UA reflex to Microscopic and Culture ([place] [facility] (except [place] and [place])     CBC with platelets     Comprehensive metabolic panel (BMP + Alb, Alk Phos, ALT, AST, Total. Bili, TP)     Ferritin     TSH     Vitamin B12     Vitamin D Deficiency          Today's Medication Changes          These changes are accurate as of 10/25/18 10:29 AM.  If you have any questions, ask your nurse or doctor.               Start taking these medicines.        Dose/Directions    mirtazapine 15 MG tablet   Commonly known as:  REMERON   Used for:  No appetite, Insomnia, unspecified type   Started by:  Chip Barlow PA-C        Dose:  15 mg   Take 1 tablet (15 mg) by mouth At Bedtime   Quantity:  30 tablet   Refills:  1         These medicines have changed or have updated prescriptions.        Dose/Directions    sertraline 100 MG tablet   Commonly known as:  ZOLOFT   This may have changed:  Another medication with the same name was removed. Continue taking this medication, and follow the directions you see here.   Used for:  Mild major depression (H)   Changed by:  Chip Barlow PA-C        Dose:  100 mg   Take 1 " tablet (100 mg) by mouth daily   Quantity:  90 tablet   Refills:  1            Where to get your medicines      These medications were sent to [facility] [address] [facility] [address] [facility]  [address] [facility] [address]     Phone:  [phone]     mirtazapine 15 MG tablet                Primary Care Provider Office Phone # Fax #    Chip Bari Barlow PA-C [phone]       [address] [facility] [address]        Equal Access to Services     MARLINE [facility] CenterMILLER : Hadii aad ku hadasho Soomaali, waaxda luqadaha, qaybta kaalmada adeegyada, waxay idiin hayaan kathrin palafox . So [facility] [phone].    ATENCIÓN: Si habla español, tiene a dahl disposición servicios gratuitos de asistencia lingüística. Tricia al [phone].    We comply with applicable federal civil rights laws and [place] laws. We do not discriminate on the basis of race, color, national origin, age, disability, sex, sexual orientation, or gender identity.            Thank you!     Thank you for choosing [facility]  for your care. Our goal is always to provide you with excellent care. Hearing back from our patients is one way we can continue to improve our services. Please take a few minutes to complete the written survey that you may receive in the mail after your visit with us. Thank you!             Your Updated Medication List - Protect others around you: Learn how to safely use, store and throw away your medicines at www.disposemymeds.org.          This list is accurate as of 10/25/18 10:29 AM.  Always use your most recent med list.                   Brand Name Dispense Instructions for use Diagnosis    amLODIPine 5 MG tablet    NORVASC    90 tablet    Take 1 tablet (5 mg) by mouth daily    Hypertension goal BP (blood pressure) < 140/90       aspirin 81 MG tablet     100    1 tab po QD (Once per day)        CALCIUM 600 + D 600-200 MG-UNIT Tabs     0    2 per day.         carvedilol 25 MG tablet    COREG    180 tablet    Take 1 tablet (25 mg) by mouth 2 times daily (with meals)    Hypertension goal BP (blood pressure) < 140/90       losartan 100 MG tablet    COZAAR    90 tablet    Take 1 tablet (100 mg) by mouth daily    Hypertension goal BP (blood pressure) < 140/90       mirtazapine 15 MG tablet    REMERON    30 tablet    Take 1 tablet (15 mg) by mouth At Bedtime    No appetite, Insomnia, unspecified type       Multi-vitamin Tabs tablet   Generic drug:  multivitamin, therapeutic with minerals      take one daily        sertraline 100 MG tablet    ZOLOFT    90 tablet    Take 1 tablet (100 mg) by mouth daily    Mild major depression (H)       thiamine mononitrate 100 MG Tabs     30 tablet    Take 100 mg by mouth daily    Thiamin deficiency       TYLENOL 325 MG tablet   Generic drug:  acetaminophen      2 TABLETS EVERY 4 HOURS AS NEEDED        UNKNOWN MED DOSAGE      timolol eye gtts one drop in each eye twice daily

## 2018-10-25 NOTE — PROGRESS NOTES
"  SUBJECTIVE:   Ghada Magana is a 83 year old female who presents to clinic today for the following health issues:    Ongoing issues per  and somewhat per Pat that she's had balance, dizziness challenges. Feels \"off balance.\" Denies focal dizziness. Her mood seems low. \"She has no energy. Tires really easily.\" That's been ongoing for a few months, but a bit worse lately. Her appetite is poor. She has no interest in foods it seems, tends to eat less at times.     Has known mild dementia. Has just seen neurology with confirmation that she still has mild dementia, but not significant.    Her blood pressures are running low per . 100/60-70 range generally.     Patient Active Problem List   Diagnosis     Congenital anomaly of lung     Osteoporosis     Anemia     Hyperlipidemia LDL goal <130     Hypertension goal BP (blood pressure) < 140/90     Advanced directives, counseling/discussion     Chronic cough     MCI (mild cognitive impairment) with memory loss     Mycobacterium avium infection (H)      Current Outpatient Prescriptions   Medication     amLODIPine (NORVASC) 5 MG tablet     ASPIRIN 81 MG OR TABS     CALCIUM 600 + D 600-200 MG-UNIT OR TABS     carvedilol (COREG) 25 MG tablet     losartan (COZAAR) 100 MG tablet     mirtazapine (REMERON) 15 MG tablet     MULTI-VITAMIN OR TABS     sertraline (ZOLOFT) 100 MG tablet     thiamine mononitrate 100 MG TABS     TYLENOL 325 MG PO TABS     UNKNOWN MED DOSAGE     [DISCONTINUED] sertraline (ZOLOFT) 50 MG tablet     No current facility-administered medications for this visit.       Problem list and histories reviewed & adjusted, as indicated.  Additional history: as documented    Labs reviewed in EPIC    Reviewed and updated as needed this visit by clinical staff  Tobacco  Allergies  Meds  Med Hx  Surg Hx  Fam Hx  Soc Hx      Reviewed and updated as needed this visit by Provider         ROS:  Constitutional, HEENT, cardiovascular, pulmonary, GI, , " "musculoskeletal, neuro, skin, endocrine and psych systems are negative, except as otherwise noted.    OBJECTIVE:     BP (!) 80/42 (Cuff Size: Adult Regular)  Pulse 76  Temp 98.1  F (36.7  C) (Oral)  Ht 5' 3.5\" (1.613 m)  Wt 102 lb (46.3 kg)  BMI 17.79 kg/m2  Body mass index is 17.79 kg/(m^2).  GENERAL: healthy, alert and no distress  HENT: ear canals and TM's normal, nose and mouth without ulcers or lesions  NECK: no adenopathy, no asymmetry, masses, or scars and thyroid normal to palpation  RESP: has scattered crackles in her lungs, otherwise   CV: regular rate and rhythm, normal S1 S2, no S3 or S4, no murmur, click or rub, no peripheral edema and peripheral pulses strong  ABDOMEN: soft, nontender, no hepatosplenomegaly, no masses and bowel sounds normal  MS: no gross musculoskeletal defects noted, no edema  SKIN: no suspicious lesions or rashes  NEURO: Normal strength and tone, mentation intact and speech normal  PSYCH: mentation appears normal, affect normal/bright    ASSESSMENT/PLAN:     (R63.4) Loss of weight  (primary encounter diagnosis)  Comment:   Plan: Comprehensive metabolic panel (BMP + Alb, Alk         Phos, ALT, AST, Total. Bili, TP), CBC with         platelets, *UA reflex to Microscopic and         Culture ([facility] (except         [place] and [place]), Vitamin B12, Vitamin         D Deficiency        Some decreased mood, some decreased appetite. I think this is a combination of her depression, her dementia and possibly chronic illness (lung disease - sees pulmonary). Will check labs. See below     (I95.9) Hypotension, unspecified hypotension type  Comment:   Plan: Reduce BP medication to 50 mg Losartan. Hopefully if we can get her BP a bit better/less low she will feel better    (W19.XXXA) Fall, initial encounter  Comment:   Plan: TSH        Reviewed options for fall management.     (R42) Dizziness  Comment:   Plan: Comprehensive metabolic panel (BMP + Alb, Alk         " Phos, ALT, AST, Total. Bili, TP), CBC with         platelets, *UA reflex to Microscopic and         Culture ([place] [facility] (except         [place] and [place]), TSH        As noted - getting BP up will hopefully help    (R41.3) Memory problem  Comment:   Plan: TSH        Reviewed - seeing neurology, see previous note    (Z86.2) History of anemia  Comment:   Plan: Ferritin        Recheck     (E55.9) Vitamin D deficiency   Comment:   Plan: Vitamin D Deficiency        Recheck     (R63.0) No appetite  Comment:   Plan: mirtazapine (REMERON) 15 MG tablet        Reviewed options. See above. I think starting Mirtazapine will help her sleep, mood, insomnia, poor diet. Follow up in 3-4 weeks.    (G47.00) Insomnia, unspecified type  Comment:   Plan: mirtazapine (REMERON) 15 MG tablet        As noted     Follow up 3 weeks.     MERON EASON PA-C  [facility]

## 2018-10-26 LAB
ALBUMIN SERPL-MCNC: 3.5 G/DL (ref 3.4–5)
ALP SERPL-CCNC: 97 U/L (ref 40–150)
ALT SERPL W P-5'-P-CCNC: 25 U/L (ref 0–50)
ANION GAP SERPL CALCULATED.3IONS-SCNC: 8 MMOL/L (ref 3–14)
AST SERPL W P-5'-P-CCNC: 31 U/L (ref 0–45)
BACTERIA SPEC CULT: NORMAL
BILIRUB SERPL-MCNC: 0.5 MG/DL (ref 0.2–1.3)
BUN SERPL-MCNC: 19 MG/DL (ref 7–30)
CALCIUM SERPL-MCNC: 9.8 MG/DL (ref 8.5–10.1)
CHLORIDE SERPL-SCNC: 98 MMOL/L (ref 94–109)
CO2 SERPL-SCNC: 26 MMOL/L (ref 20–32)
CREAT SERPL-MCNC: 0.77 MG/DL (ref 0.52–1.04)
DEPRECATED CALCIDIOL+CALCIFEROL SERPL-MC: 71 UG/L (ref 20–75)
FERRITIN SERPL-MCNC: 84 NG/ML (ref 8–252)
GFR SERPL CREATININE-BSD FRML MDRD: 71 ML/MIN/1.7M2
GLUCOSE SERPL-MCNC: 95 MG/DL (ref 70–99)
POTASSIUM SERPL-SCNC: 4.5 MMOL/L (ref 3.4–5.3)
PROT SERPL-MCNC: 8.4 G/DL (ref 6.8–8.8)
SODIUM SERPL-SCNC: 132 MMOL/L (ref 133–144)
SPECIMEN SOURCE: NORMAL
TSH SERPL DL<=0.005 MIU/L-ACNC: 2.38 MU/L (ref 0.4–4)

## 2018-11-01 ENCOUNTER — TRANSFERRED RECORDS (OUTPATIENT)
Dept: HEALTH INFORMATION MANAGEMENT | Facility: CLINIC | Age: 83
End: 2018-11-01

## 2018-11-02 ENCOUNTER — OFFICE VISIT (OUTPATIENT)
Dept: NEUROPSYCHOLOGY | Facility: CLINIC | Age: 83
End: 2018-11-02
Payer: MEDICARE

## 2018-11-02 DIAGNOSIS — R41.844 FRONTAL LOBE AND EXECUTIVE FUNCTION DEFICIT: ICD-10-CM

## 2018-11-02 DIAGNOSIS — F03.90 SENILE DEMENTIA, UNCOMPLICATED (H): Primary | ICD-10-CM

## 2018-11-02 DIAGNOSIS — R41.3 MEMORY LOSS: ICD-10-CM

## 2018-11-02 DIAGNOSIS — F41.9 ANXIETY: ICD-10-CM

## 2018-11-02 DIAGNOSIS — F33.1 MAJOR DEPRESSIVE DISORDER, RECURRENT EPISODE, MODERATE (H): ICD-10-CM

## 2018-11-02 NOTE — MR AVS SNAPSHOT
After Visit Summary   11/2/2018    Ghada Magana    MRN: [id number]           Patient Information     Date Of Birth          1935        Visit Information        Provider Department      11/2/2018 12:30 PM Mikey Fisher, PhD [facility] Neuropsychology        Today's Diagnoses     Senile dementia, uncomplicated    -  1    Memory loss        Frontal lobe and executive function deficit        Major depressive disorder, recurrent episode, moderate (H)        Anxiety           Follow-ups after your visit        Your next 10 appointments already scheduled     Nov 15, 2018  9:40 AM CST   SHORT with Chip Barlow PA-C   [facility]    [address] [place] [address] [phone]            Jan 16, 2019 11:00 AM CST   Return Visit with Henrry De Anda MD   [facility]    [address] [place] [address] [phone]              Who to contact     Please call your clinic at [phone] to:    Ask questions about your health    Make or cancel appointments    Discuss your medicines    Learn about your test results    Speak to your doctor            Additional Information About Your Visit        Care EveryWhere ID     This is your Care EveryWhere ID. This could be used by other organizations to access your [place] medical records  [id number]         Blood Pressure from Last 3 Encounters:   10/25/18 (!) 80/42   10/10/18 108/62   08/23/18 100/62    Weight from Last 3 Encounters:   10/25/18 46.3 kg (102 lb)   10/10/18 47.2 kg (104 lb)   08/23/18 49 kg (108 lb)              We Performed the Following     [id number] TESTING, PER HR/PSYCHOLOGIST        Primary Care Provider Office Phone # Fax #    Chip Barlow PA-C [phone]       [address] [place] [address]        Equal Access to Services     MARLINE OLIVER  AH: Zoila poolemanuel Ambrose, waaxda luqadaha, qaybta kaorestes enamorado, julieta pain hayaakaleigh lordmark jefferson laHalielizeth shelton. So [facility] [phone].    ATENCIÓN: Si daniella janine, tiene a dahl disposición servicios gratuitos de asistencia lingüística. Llame al [phone].    We comply with applicable federal civil rights laws and [place] laws. We do not discriminate on the basis of race, color, national origin, age, disability, sex, sexual orientation, or gender identity.            Thank you!     Thank you for choosing [facility] NEUROPSYCHOLOGY  for your care. Our goal is always to provide you with excellent care. Hearing back from our patients is one way we can continue to improve our services. Please take a few minutes to complete the written survey that you may receive in the mail after your visit with us. Thank you!             Your Updated Medication List - Protect others around you: Learn how to safely use, store and throw away your medicines at www.disposemymeds.org.          This list is accurate as of 11/2/18 11:59 PM.  Always use your most recent med list.                   Brand Name Dispense Instructions for use Diagnosis    amLODIPine 5 MG tablet    NORVASC    90 tablet    Take 1 tablet (5 mg) by mouth daily    Hypertension goal BP (blood pressure) < 140/90       aspirin 81 MG tablet     100    1 tab po QD (Once per day)        CALCIUM 600 + D 600-200 MG-UNIT Tabs     0    2 per day.        carvedilol 25 MG tablet    COREG    180 tablet    Take 1 tablet (25 mg) by mouth 2 times daily (with meals)    Hypertension goal BP (blood pressure) < 140/90       losartan 100 MG tablet    COZAAR    90 tablet    Take 1 tablet (100 mg) by mouth daily    Hypertension goal BP (blood pressure) < 140/90       mirtazapine 15 MG tablet    REMERON    30 tablet    Take 1 tablet (15 mg) by mouth At Bedtime    No appetite, Insomnia, unspecified type       Multi-vitamin Tabs tablet   Generic drug:  multivitamin, therapeutic with  minerals      take one daily        sertraline 100 MG tablet    ZOLOFT    90 tablet    Take 1 tablet (100 mg) by mouth daily    Mild major depression (H)       thiamine mononitrate 100 MG Tabs     30 tablet    Take 100 mg by mouth daily    Thiamin deficiency       TYLENOL 325 MG tablet   Generic drug:  acetaminophen      2 TABLETS EVERY 4 HOURS AS NEEDED        UNKNOWN MED DOSAGE      timolol eye gtts one drop in each eye twice daily

## 2018-11-06 NOTE — PROGRESS NOTES
Name: Ghada Magana   MR#: [id number]  YOB: 1935  Date of Exam: 11/02/2018    Neuropsychology Laboratory  [address], [facility] [address]  [place]  [address] [phone]  NEUROPSYCHOLOGICAL EVALUATION    IDENTIFYING INFORMATION  Ghada Magana is an 83 year old, right handed, retired , with 12 years of formal education. She was accompanied to the evaluation by her , Neeraj.     BACKGROUND INFORMATION / INTERVIEW FINDINGS    Records indicate that Ms. Magana's medical history includes hypertension, hyperlipidemia, mycobacterium avium infection, chronic cough, anemia, osteoporosis, and congenital anomaly of the lung. She has also been diagnosed with mild cognitive impairment. A CT scan of her head on 06/08/2017 documented moderate generalized volume loss. I saw the patient for a neuropsychology exam on 07/31/2017. She demonstrated weaknesses in this exam that were felt to raise some question of bilateral frontal and temporal region dysfunction. The etiology was felt to be not certain, but was felt to be compatible with cerebrovascular disease. She was also reporting symptoms of depression and anxiety, and these mental health factors were felt to be contributing to the weaknesses noted in exam. In the exam, weaknesses and variability were identified in aspects of expressive language, some executive abilities, and some aspects of verbal memory. In the interval, she has been started on an antidepressant medication. The current follow-up exam was requested by Dr. Henrry De Anda, in this context.    Of note, the patient was initially interviewed alone, as her  [place] [facility] the [place] of her interview. Some time after the initial portion of her interview, her  returned to our clinic, and he joined in the meeting.    On interview, Ms. Magana reported that she did not know if she has  "had changes in her thinking over the last year. She reported that she used to go out more, and now does not drive. She reported that she has suffered a few falls, including the fall where she struck her head and had a cut. She reported that this fall occurred three or four months ago, and she did not lose consciousness. She did not go to the hospital. She reported that her last fall occurred three weeks before this appointment. She noted that she typically has falls and home. She stated that the falls \"just happen,  and denied being dizzy beforehand. She did note that she has some episodes where she feels \"woozy.\" She later reported that she sometimes notices problems with her thinking. She described instances where she had trouble finding dishes or other  items in their home. She stated that she misplaces items. During this portion of the interview, there was a period of time where we were trying to ascertain where the patient's  was located. The patient was vague in her description of her 's location, and it became apparent that she was not entirely certain where he had gone. I asked the patient if she had a cell phone and could call him, but she stated that she did not have a phone. She also indicated that she did not know her 's cell phone number.    As noted above, the patient's  arrived and was then present for a portion of the interview. He reported that there has been some change in her thinking over the last 15 months, but she has not had a dramatic decline. He stated that she has a little confusion about where to find items in their home such as a medicine that she uses routinely. He described an incident in which she forgot the code to open their garage door. He stated that she has spotty memory issues. He noted that she forgets people. The patient did acknowledge that she may forget people s surnames. He stated that he encourages her to think about her actions before engaging " "in actions. He reported that she forgot their anniversary. He did note that she uses a calendar and datebook. He stated that she used to be very prompt about sending out cards for birthdays and anniversaries of loved ones, but no longer does so. He stated that this has been a change in the last 15 months. He stated that she may forget phone numbers, and then pushes too many numbers on the telephone when calling people. He otherwise denied having identified cognitive changes or difficulties.    Regarding mental health, the patient reported that she did not know how her mood was. When pressed, she stated that she had  no idea what kind of mood  that she has. Her  stated that her mood changes quickly. He reported that she can be \"mopey.\" The patient denied feeling sad. She reported that she has been prescribed a medicine for depression, and noted that she recently had an increase the dose of this medicine. She has also been given a medication for sleep. The patient and her  reported that they are not sure if they have seen an improvement in her mood since she was prescribed this medicine. They noted that their daughter has been dealing with ovarian cancer, which has been a stressor. The patient denied suicidal ideation.    With respect to other medical background, Ms. Magana denied interval TBI, stroke, or seizure. She reported that her sleep is sometimes good, but noted that she sometimes cannot sleep well and wakes up a lot. She reported that she typically sleeps eight hours on average, and had a pretty good night of sleep prior to the current exam. She denied pain. She denied significant changes in her medical history in the last 15 months. Per records, her current medications include amlodipine, aspirin, calcium, carvedilol, losartan, mirtazapine, multivitamin, sertraline, thiamine, and acetaminophen. She reported that she consumes a small alcoholic drink per night, but denied other substance " "use.    Ms. Magana continues to live at home with her . She manages her own basic daily activities. Her  has been managing her medicines for the last year. He also now manages their finances, and he prepares their meals. She no longer drives. The patient and her  denied significant changes in their family situation, her educational background, or her work history. She remains retired. She spends much of her time quilting and knitting.    BEHAVIORAL OBSERVATIONS  Ms. Magana was polite and largely cooperative with the exam. She had difficulties with hearing, and the exam was completed with the use of a \"Pocket Talker\" voice amplification device. Her speech was notable for moderate difficulties with word finding, mild dysfluency, mild dysarticulation, and mildly typical prosody. Paraphasias were not noted. Comprehension was rated as mildly impaired. She had difficulty on a standardized measure of verbal comprehension. Her thought processes were notable for difficulties with memory and troubles understanding executive functioning measures. Her mood was depressed with flat affect. Her effort was rated is adequate, as she required prompting you take guesses on some measures, and also produced drawings of different objects on a memory task. The current results are felt to be a broadly accurate depiction of her cognitive functioning.    RESULTS OF EXAM  Her performances on measures of neuropsychological functioning were as follows:      She was severely disoriented to time (she refused to guess the year, even when provided with multiple choice options) . She was disoriented to place (she refused to guess on the city or the name of the clinic). She misstated her address, but was otherwise oriented to various aspects of personal information. Auditory attention for digits was low average. Learning of words in a list format was impaired. Delayed recall of list words was impaired, as she was unable to " recollect any of the list stimuli. Percent retention a list words was impaired. Delayed recognition of list words was impaired, and notable for poor discrimination with five false positive errors. Learning of story information was borderline impaired. Delayed recall of story information was low average compared to lenient age corrected norms. Delayed recognition of story information was performed at chance levels. Learning of simple geometric shapes and their spatial locations was borderline impaired. Delayed recall of these shapes and their locations was impaired. She loli a number of pictures on this trial including a cat, a house, a tree, and a star, among others. Percent retention of the shapes was impaired. Delayed recognition of the shapes, however, was normal. Visuospatial judgments for variably oriented lines were average. Visual problem-solving with blocks was average. Her drawing of a complicated geometric figure was borderline impaired, and notable for a slow and disorganized approach with poor appreciation of the figure s details. Comprehension of phrases and short stories was impaired. Verbal associative fluency was impaired. Semantic verbal fluency was impaired. Naming to confrontation was borderline impaired. Verbal abstract reasoning was low average. Speeded visual sequencing under focused attention was performed in the low average to average range. A similar measure with a divided attention component was impaired, with three errors. Speeded word reading was performing the borderline impaired to low average range. Speeded color naming was impaired. Speeded inhibition of an overlearned response was impaired, and discontinued as she had difficulty understanding the task instructions. Speeded visual motor coding was average.    She endorsed items consistent with moderate symptoms of depression, and moderate symptoms of anxiety on self-report measures. During administration these measures, there was some  uncertainty as to whether the patient was fully understanding the content of the questions.    IMPRESSIONS  Dementia. Ms. Magana demonstrated deficits that are consistent with dysfunction of the frontal and bilateral temporal brain regions. 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 Relative to her exam from one year ago, there has been a mild decline in her cognition. She also now is no longer managing several of her instrumental daily activities. Thus, her deficits are consistent with a mild dementia syndrome. She has noteworthy deficits in orientation, anterograde memory, expressive language, and executive functioning. There are also weaknesses in attention and learning. Basic aspects of visual processing and visual problem-solving are normal, as is psychomotor speed. Relative preservation was additionally identified on a measure of nonverbal recognition memory. She is also reporting moderately elevated symptoms of depression and anxiety. In fact, she is now reporting more severe symptoms of anxiety relative to one year ago. As noted above, there was some question as to whether the patient was fully comprehending the content on these measures. In any case, I do suspect that psychological factors are contributing to some of the variability in this exam, and may also be contributing to some of her cognitive variability in day-to-day life. However, I do not think that psychological factors are fully responsible for her cognitive dysfunction.    RECOMMENDATIONS  Preliminary results and recommendations were provided to the patient and her  over the telephone on 11/06/2018, and all questions were answered.     1. In spite of treatment, she remains depressed and anxious. If medically indicated, consideration could be given to modified treatment of her mental health.    2. The patient will require ongoing support and supervision of her daily activities. It is likely that  these support needs will increase in the future.    3. The patient has problems with hearing. A referral to an audiologist would be of benefit.    4. The patient and her  would benefit from being connected with the Alzheimer's Association. The Alzheimer s Association has many good resources for patients and their family members.    5. Follow-up neuropsychological evaluation is again recommended in 1 year, if clinically indicated. The current results could be used as a baseline in the future.     Mikey Fisher, Ph.D., L.P., ABPP-CN   / Licensed Psychologist [id number]  Department of Rehabilitation Medicine  Division of Adult Neuropsychology  [facility]    Time spent:  four hours professional time, including record review, data integration, and report writing (CPT 84608); two hours of testing administered by a practicum student (0 hours billed) and interpreted by a neuropsychologist (CPT 49729). Diagnoses: F03.90, R41.3, R41.844, F33.1, F41.9.

## 2018-11-07 NOTE — PROGRESS NOTES
__ Orientation            Time          ___-60____        Place        ____0____        Personal Info.     ____3____        Presidents                   0            WAIS-IV   FSIQ____VCI_____PRI_____ WMI____PSI____     Raw  Age SS  __Similarities  __12___ ___6___  __Vocabulary  _______ _______  __Information  _______          _______  __Comprehension _______ _______  __Block Design  __24___ ___10___  __Matrix Reas.  _______ _______  __Visual Puzzles _______ _______  __Picture Comp. _______ _______  __Figure Weights _______ _______  __Digit Span  __15___ ___6___  __Arithmetic  ________             _____  __L-N Sequencing _______ _______  __Symbol Search _______ _______  __Coding  __33___ ___9___  __Cancellation  _______ _______    __HVLT - R        Trial    1      2        3      Total   _2_   _5_   _5_    _12_         Raw     Tscore       Immediate Recall _12_  _29_       Delayed Recall __0_  _23        Retention (%)  _0  _?20_       Rec/Dis Ind.  #Hits _8_   #FPs_5_  Tscore _?20_    __BVMT - R        Trial    1      2        3      Total   _2_   _4_   _6_    _12_         Raw     Tscore       Immediate Recall __12_  36_       Delayed Recall __0_  _<20_        Retention (%)  __0%_  _<1%_       Rec/Dis Ind.  #Hits _6_   #FPs_1_  Tscore _>16_    __Stephen-Jessicaeith/Fadia Complex Figure Test    Raw  T-score              %ile  Copy   __22.5__              2-5                   Time               _528 __                          __COWA   Raw__13___ Tscore__27___  __Semantic Fluency/Animals   Raw = 7  Tscore = 22  __BNT   Raw = 26 %ile = 3  __Complex Ideational Material   Raw = 9  T-Score = 25    __Trail Making Test   A =     68         Errors = 0    %ile = 17-33    B =   296         Errors = 3    %ile = <8  __Stroop                       Raw         %ile        Word = _54_      __8-17__        Color =  _20_      __<8__        C/W   =  _d/c_     __ n/a__     __JoLO   Raw = 14 %ile = 31-37    __BDI-II    Raw__24__ Interp.__moderate___   __BAI     Raw__24__ Interp. __moderate___    __WMS-III   LM1 = 14 SS = 5   LM2 = 3  SS = 6   LM2R = 15 Zscore = -1.73

## 2018-11-15 ENCOUNTER — OFFICE VISIT (OUTPATIENT)
Dept: FAMILY MEDICINE | Facility: CLINIC | Age: 83
End: 2018-11-15
Payer: MEDICARE

## 2018-11-15 VITALS
DIASTOLIC BLOOD PRESSURE: 58 MMHG | WEIGHT: 101 LBS | HEART RATE: 76 BPM | BODY MASS INDEX: 17.24 KG/M2 | TEMPERATURE: 98.1 F | HEIGHT: 64 IN | SYSTOLIC BLOOD PRESSURE: 130 MMHG

## 2018-11-15 DIAGNOSIS — F03.A0 MILD DEMENTIA (H): Primary | ICD-10-CM

## 2018-11-15 DIAGNOSIS — F32.0 MILD MAJOR DEPRESSION (H): ICD-10-CM

## 2018-11-15 DIAGNOSIS — G31.84 MCI (MILD COGNITIVE IMPAIRMENT) WITH MEMORY LOSS: ICD-10-CM

## 2018-11-15 PROCEDURE — 99214 OFFICE O/P EST MOD 30 MIN: CPT | Performed by: PHYSICIAN ASSISTANT

## 2018-11-15 RX ORDER — MIRTAZAPINE 30 MG/1
30 TABLET, FILM COATED ORAL AT BEDTIME
Qty: 90 TABLET | Refills: 1 | Status: SHIPPED | OUTPATIENT
Start: 2018-11-15 | End: 2019-05-14

## 2018-11-15 NOTE — PROGRESS NOTES
"  SUBJECTIVE:   Ghada Magana is a 83 year old female who presents to clinic today for the following health issues:      Recheck memory - patient says that things have been slower and slower.   says that as the day goes on, her memory deteriorates. Did have a full 5 hour Neuropsych work up a month or so ago. Mostly cognitive decline, mild per the work up.     Neeraj notes that she gets confused during the day. Tends to get anxious because she gets \"stuck\" on things, can't seem to get off of certain ideas and thoughts she thinks regarding dates / times.    He's struggling with caring for her a bit. She is not falling or injuring herself but frequent prompts, reminders etc. She sleeps okay. Has little appetite. Weight has come down a bit. \"She doesn't ever want to eat.\" Per Neeraj.    Patient Active Problem List   Diagnosis     Congenital anomaly of lung     Osteoporosis     Anemia     Hyperlipidemia LDL goal <130     Hypertension goal BP (blood pressure) < 140/90     Advanced directives, counseling/discussion     Chronic cough     MCI (mild cognitive impairment) with memory loss     Mycobacterium avium infection (H)     Mild dementia     Mild major depression (H)      Current Outpatient Prescriptions   Medication     amLODIPine (NORVASC) 5 MG tablet     ASPIRIN 81 MG OR TABS     CALCIUM 600 + D 600-200 MG-UNIT OR TABS     carvedilol (COREG) 25 MG tablet     losartan (COZAAR) 100 MG tablet     mirtazapine (REMERON) 30 MG tablet     MULTI-VITAMIN OR TABS     sertraline (ZOLOFT) 100 MG tablet     thiamine mononitrate 100 MG TABS     TYLENOL 325 MG PO TABS     UNKNOWN MED DOSAGE     [DISCONTINUED] mirtazapine (REMERON) 15 MG tablet     No current facility-administered medications for this visit.       Problem list and histories reviewed & adjusted, as indicated.  Additional history: as documented    Labs reviewed in EPIC    Reviewed and updated as needed this visit by clinical staff  Tobacco  Allergies  Meds  " "Med Hx  Surg Hx  Fam Hx  Soc Hx      Reviewed and updated as needed this visit by Provider         ROS:  Constitutional, HEENT, cardiovascular, pulmonary, gi and gu systems are negative, except as otherwise noted.    OBJECTIVE:     /58 (Cuff Size: Adult Regular)  Pulse 76  Temp 98.1  F (36.7  C) (Oral)  Ht 5' 3.5\" (1.613 m)  Wt 101 lb (45.8 kg)  BMI 17.61 kg/m2  Body mass index is 17.61 kg/(m^2).  GENERAL: healthy, alert and no distress  Psych: Appropriate appearance.  Alert and oriented times 3; coherent speech, normal   rate and volume, mostly able to articulate logical thoughts, mostly able   to abstract reason, no tangential thoughts, no hallucinations   or delusions.  Normal behavior.  Her affect is flat.      ASSESSMENT/PLAN:     (F03.90) Mild dementia  (primary encounter diagnosis)  Comment:   Plan: Continued challenges. Mostly struggling at home. Neeraj her  is really having a challenging time with all of this I think. Still living safely at home. Reviewed options. See below.    (G31.84) MCI (mild cognitive impairment) with memory loss  Comment:   Plan:     (F32.0) Mild major depression (H)  Comment:   Plan: mirtazapine (REMERON) 30 MG tablet        Depression is contributing but not the main cause of her challenges. Increased the Mirtazapine to help with sleep and her poor appetite. Some of their challenges do not have an easy fix as I tried to explain - her cognitive abilities have decreased as part of normal aging processes and there's not exactly going to be a perfect solution.    Advised to continue to stay active, social, eat well, get good sleep, etc.     I spent 25 minutes with them over 50% of which was counseling today    MERON EASON PA-C  [facility]  "

## 2018-11-15 NOTE — MR AVS SNAPSHOT
"              After Visit Summary   11/15/2018    Ghada Magana    MRN: [id number]           Patient Information     Date Of Birth          1935        Visit Information        Provider Department      11/15/2018 9:40 AM Chip Barlow PA-C [facility]        Today's Diagnoses     Mild dementia    -  1    MCI (mild cognitive impairment) with memory loss        Mild major depression (H)           Follow-ups after your visit        Your next 10 appointments already scheduled     Jan 16, 2019 11:00 AM CST   Return Visit with Henrry De Anda MD   [facility] ([facility])    [address] [place] [id number] [phone]              Who to contact     If you have questions or need follow up information about today's clinic visit or your schedule please contact [facility] directly at [phone].  Normal or non-critical lab and imaging results will be communicated to you by MyChart, letter or phone within 4 business days after the clinic has received the results. If you do not hear from us within 7 days, please contact the clinic through MyChart or phone. If you have a critical or abnormal lab result, we will notify you by phone as soon as possible.  Submit refill requests through [employer] or call your pharmacy and they will forward the refill request to us. Please allow 3 business days for your refill to be completed.          Additional Information About Your Visit        Care EveryWhere ID     This is your Care EveryWhere ID. This could be used by other organizations to access your [facility] medical records  [id number]        Your Vitals Were     Pulse Temperature Height BMI (Body Mass Index)          76 98.1  F (36.7  C) (Oral) 5' 3.5\" (1.613 m) 17.61 kg/m2         Blood Pressure from Last 3 Encounters:   11/15/18 130/58   10/25/18 (!) 80/42   10/10/18 108/62    Weight from Last 3 Encounters: "   11/15/18 101 lb (45.8 kg)   10/25/18 102 lb (46.3 kg)   10/10/18 104 lb (47.2 kg)              Today, you had the following     No orders found for display         Today's Medication Changes          These changes are accurate as of 11/15/18 10:28 AM.  If you have any questions, ask your nurse or doctor.               These medicines have changed or have updated prescriptions.        Dose/Directions    losartan 100 MG tablet   Commonly known as:  COZAAR   This may have changed:  how much to take   Used for:  Hypertension goal BP (blood pressure) < 140/90        Dose:  100 mg   Take 1 tablet (100 mg) by mouth daily   Quantity:  90 tablet   Refills:  1       mirtazapine 30 MG tablet   Commonly known as:  REMERON   This may have changed:    - medication strength  - how much to take   Used for:  Mild major depression (H)   Changed by:  Chip Barlow PA-C        Dose:  30 mg   Take 1 tablet (30 mg) by mouth At Bedtime   Quantity:  90 tablet   Refills:  1            Where to get your medicines      These medications were sent to [facility] [address] [place] [address]     Phone:  [phone]     mirtazapine 30 MG tablet                Primary Care Provider Office Phone # Fax #    Chip Barlow PA-C [phone] [address] [facility] [address]        Equal Access to Services     [facility] AH: Hadii ron ku hadasho Soomaali, waaxda luqadaha, qaybta kaalmada adeegyada, julieta mcclain haylolisn kathrin shelton. So [facility] [phone].    ATENCIÓN: Si habla español, tiene a dahl disposición servicios gratuitos de asistencia lingüística. Tricia cisneros [phone].    We comply with applicable federal civil rights laws and [place] laws. We do not discriminate on the basis of race, color, national origin, age, disability, sex, sexual orientation, or gender identity.            Thank you!     Thank you for choosing [facility]  for your care. Our goal is always to provide you with excellent care. Hearing back from our patients is one way we can continue to improve our services. Please take a few minutes to complete the written survey that you may receive in the mail after your visit with us. Thank you!             Your Updated Medication List - Protect others around you: Learn how to safely use, store and throw away your medicines at www.disposemymeds.org.          This list is accurate as of 11/15/18 10:28 AM.  Always use your most recent med list.                   Brand Name Dispense Instructions for use Diagnosis    amLODIPine 5 MG tablet    NORVASC    90 tablet    Take 1 tablet (5 mg) by mouth daily    Hypertension goal BP (blood pressure) < 140/90       aspirin 81 MG tablet     100    1 tab po QD (Once per day)        CALCIUM 600 + D 600-200 MG-UNIT Tabs     0    2 per day.        carvedilol 25 MG tablet    COREG    180 tablet    Take 1 tablet (25 mg) by mouth 2 times daily (with meals)    Hypertension goal BP (blood pressure) < 140/90       losartan 100 MG tablet    COZAAR    90 tablet    Take 1 tablet (100 mg) by mouth daily    Hypertension goal BP (blood pressure) < 140/90       mirtazapine 30 MG tablet    REMERON    90 tablet    Take 1 tablet (30 mg) by mouth At Bedtime    Mild major depression (H)       Multi-vitamin Tabs tablet   Generic drug:  multivitamin, therapeutic with minerals      take one daily        sertraline 100 MG tablet    ZOLOFT    90 tablet    Take 1 tablet (100 mg) by mouth daily    Mild major depression (H)       thiamine mononitrate 100 MG Tabs     30 tablet    Take 100 mg by mouth daily    Thiamin deficiency       TYLENOL 325 MG tablet   Generic drug:  acetaminophen      2 TABLETS EVERY 4 HOURS AS NEEDED        UNKNOWN MED DOSAGE      timolol eye gtts one drop in each eye twice daily

## 2018-11-19 ENCOUNTER — TELEPHONE (OUTPATIENT)
Dept: FAMILY MEDICINE | Facility: CLINIC | Age: 83
End: 2018-11-19

## 2018-11-19 NOTE — TELEPHONE ENCOUNTER
Reason for Call:  Other     Detailed comments: Patient  requesting for appt for tomorrow, pcp fully book, next appt available is 11/29/18. Patient  requesting for pcp nurse to call regarding patient memory problem. Per , something snapped and went terribly wrong over the weekend. Please advise.     Phone Number Patient can be reached at: Home number on file [phone] (home)    Best Time: Anytime    Can we leave a detailed message on this number? YES    Call taken on 11/19/2018 at 4:16 PM by Angelica Seth

## 2018-11-19 NOTE — TELEPHONE ENCOUNTER
"Route to PCP.  really wants patient to see you due to acute changes. I did tell him he should have her seen tonight because this could be numerous things such as UTI or stroke, amongst other less worrisome things. Are you able to fit them in tomorrow morning? They wouldn't be able to come during C3PO time because he has something else going on at that time, but could come later in the afternoon.    Ghada Magana is a 83 year old female who calls with confusion.    NURSING ASSESSMENT:  Description:  Spoke with . He states \"Something snapped and went off the wall this weekend. She is making no sense, talking about things that are going on that aren't going on. Just scribbling things.\" He says this started Thursday night. \"The mind is gone now. I don't even know this person.\" She is A/O to place and person, but not the situation. He says \"this is completely new\". He had numerous stories of odd things she has done over before that she started to do over the weekend that he didn't understand.   Onset/duration:  Started Thursday  Precip. factors:  Patient has a history of memory problems  Associated symptoms:  See description  Improves/worsens symptoms:  n/a  Allergies:   Allergies   Allergen Reactions     Ace Inhibitors      Cough     Hctz      Increased nightime urination     NURSING PLAN: Nursing advice to patient go to ER due to acute changes in mental status    RECOMMENDED DISPOSITION:  To ED  Will comply with recommendation: No- Barriers to comply with plan of care patient's  wants to see PCP only.  If further questions/concerns or if symptoms do not improve, worsen or new symptoms develop, call your PCP or [facility] as soon as possible.      Guideline used:  Telephone Triage Protocols for Nurses, Fifth Edition, Monica Arita \"confusion\"    Aldair Leach RN    "

## 2018-11-20 ENCOUNTER — APPOINTMENT (OUTPATIENT)
Dept: CT IMAGING | Facility: CLINIC | Age: 83
End: 2018-11-20
Attending: FAMILY MEDICINE
Payer: MEDICARE

## 2018-11-20 ENCOUNTER — HOSPITAL ENCOUNTER (EMERGENCY)
Facility: CLINIC | Age: 83
Discharge: HOME | End: 2018-11-20
Attending: FAMILY MEDICINE | Admitting: FAMILY MEDICINE
Payer: MEDICARE

## 2018-11-20 VITALS
SYSTOLIC BLOOD PRESSURE: 145 MMHG | HEART RATE: 88 BPM | RESPIRATION RATE: 16 BRPM | HEIGHT: 62 IN | WEIGHT: 101 LBS | TEMPERATURE: 96.2 F | DIASTOLIC BLOOD PRESSURE: 78 MMHG | OXYGEN SATURATION: 95 % | BODY MASS INDEX: 18.58 KG/M2

## 2018-11-20 DIAGNOSIS — F03.90 DEMENTIA WITHOUT BEHAVIORAL DISTURBANCE, UNSPECIFIED DEMENTIA TYPE: ICD-10-CM

## 2018-11-20 LAB
ALBUMIN SERPL-MCNC: 3.3 G/DL (ref 3.4–5)
ALP SERPL-CCNC: 116 U/L (ref 40–150)
ALT SERPL W P-5'-P-CCNC: 30 U/L (ref 0–50)
AMMONIA PLAS-SCNC: NORMAL UMOL/L (ref 10–50)
ANION GAP SERPL CALCULATED.3IONS-SCNC: 8 MMOL/L (ref 3–14)
AST SERPL W P-5'-P-CCNC: 35 U/L (ref 0–45)
BILIRUB SERPL-MCNC: 0.6 MG/DL (ref 0.2–1.3)
BUN SERPL-MCNC: 22 MG/DL (ref 7–30)
CALCIUM SERPL-MCNC: 9.3 MG/DL (ref 8.5–10.1)
CHLORIDE SERPL-SCNC: 99 MMOL/L (ref 94–109)
CO2 SERPL-SCNC: 28 MMOL/L (ref 20–32)
CREAT SERPL-MCNC: 0.6 MG/DL (ref 0.52–1.04)
GFR SERPL CREATININE-BSD FRML MDRD: >90 ML/MIN/1.7M2
GLUCOSE SERPL-MCNC: 108 MG/DL (ref 70–99)
POTASSIUM SERPL-SCNC: 5.2 MMOL/L (ref 3.4–5.3)
PROT SERPL-MCNC: 8.4 G/DL (ref 6.8–8.8)
SODIUM SERPL-SCNC: 135 MMOL/L (ref 133–144)
TROPONIN I SERPL-MCNC: <0.015 UG/L (ref 0–0.04)
TSH SERPL DL<=0.005 MIU/L-ACNC: 3.22 MU/L (ref 0.4–4)

## 2018-11-20 PROCEDURE — 99285 EMERGENCY DEPT VISIT HI MDM: CPT | Mod: 25 | Performed by: FAMILY MEDICINE

## 2018-11-20 PROCEDURE — 84443 ASSAY THYROID STIM HORMONE: CPT | Performed by: FAMILY MEDICINE

## 2018-11-20 PROCEDURE — 70450 CT HEAD/BRAIN W/O DYE: CPT

## 2018-11-20 PROCEDURE — 80053 COMPREHEN METABOLIC PANEL: CPT | Performed by: FAMILY MEDICINE

## 2018-11-20 PROCEDURE — 93010 ELECTROCARDIOGRAM REPORT: CPT | Mod: Z6 | Performed by: FAMILY MEDICINE

## 2018-11-20 PROCEDURE — 84484 ASSAY OF TROPONIN QUANT: CPT | Performed by: FAMILY MEDICINE

## 2018-11-20 PROCEDURE — 93005 ELECTROCARDIOGRAM TRACING: CPT | Performed by: FAMILY MEDICINE

## 2018-11-20 NOTE — ED TRIAGE NOTES
Increased confusion for the past 2-3 years but has worsened in the last 3 days. No facial asymmetry, no lateral weakness, denies numbness/tingling, speech clear.

## 2018-11-20 NOTE — TELEPHONE ENCOUNTER
Spoke to jeanette he has not taken her to the ER, explained that this is the most appropriate course to take, he went on to say he needs to run errands this morning to  [place], encouraged him to take her first to ER. He states he will do that then.   Mar Fairchild,Clinic Rn  [place]

## 2018-11-20 NOTE — DISCHARGE INSTRUCTIONS
Thank you for choosing [facility].     Please closely monitor for further symptoms. Return to the Emergency Department if you develop any new or worsening signs or symptoms.    If you received any opiate pain medications or sedatives during your visit, please do not drive for at least 8 hours.     Labs, cultures or final xray interpretations may still need to be reviewed.  We will call you if your plan of care needs to be changed.    Please follow up with your primary care physician or clinic as soon as possible.      For Caregivers: Daily Care for People With Dementia  Over time, people with dementia will need more and more help with daily tasks. These include eating meals, taking medicines, and getting enough exercise. They also include personal care needs, such as bathing and dressing. To reduce stress, make these activities part of a routine. Ask family and friends to lend a hand. And be aware that your loved one s abilities can change from day to day. If you have problems meeting your loved one s needs, it s time to get help. Talk with a  or local support agency--such as a local Alzheimer s [employer] chapter.   Activity and exercise  Regular activity is good for your loved one s body and mind. It may even help slow the progression of the disease. Keep to your loved one s old routines when possible. It also helps to:    Do things together. Go for a walk, garden, or bake a cake. Basic, repetitive activities are good choices.    Be active as often as possible. This releases pent-up energy, which can reduce restlessness and improve sleep.    Include social activities. Take your loved one to see friends and family. But try to keep things simple. Loud noises, crowds, or too many people talking at once can be upsetting.  Taking medicines  Be sure all prescribed medicines are taken as directed. These tips can help:    Provide supervision if your loved one can't safely  take medicines alone.    Set a routine so medicines are taken at the same time each day.    Ensure that all medicines are taken. A pillbox can help you keep track.    Plan ahead. Refill prescriptions before they run out.  Eating meals  At mealtime, serve healthy foods with plenty of fluids. These tips can also help:    Keep meals simple. Too many choices can be overwhelming. Try to maintain a calm, quiet atmosphere while you eat.    Place healthy snacks, such as fresh fruit, out where they can be seen.    Watch eating habits. People with dementia may eat too little or too much. Talk to the healthcare provider if you have concerns.    Try finger foods if regular meals become too difficult for your loved one to eat.  Dressing  People with dementia may have trouble choosing what to wear. It s OK if clothes don t always match. But if help is needed:    Choose clothing that is easy to put on and take off. Use shoes or slippers that fasten with a hook and loop.    Lay out a fresh outfit each day. Place clothes in the order they should be put on.    If more help is needed, hand over clothing items one at a time. Explain how each item should be put on.    Put dirty clothes away so they re not worn again.  Bathing and grooming  Getting your loved one to bathe can be a real challenge. Try these tips:    Treat bathing as a routine activity. But be flexible. A daily bath is probably unrealistic.    Prepare bath items ahead of time, and be sure to test the water temperature.    Don't leave your loved one alone in the bath or shower.               Try visiting a barbershop or [employer] salon for help with hair washing, hair styling, and shaving.  Using the toilet  In later stages, people with dementia may have trouble controlling their bladder or bowel (incontinence). To ease problems:    Set a routine for using the toilet (for example, every 3 hours) and stick to it.    Limit beverages before bedtime to prevent accidents. A bedside  commode may also help.    Be understanding if accidents happen. Your loved one may be as upset as you.    At one point it may be necessary to have them wear an adult diaper.      Talk to a healthcare provider if incontinence develops suddenly. It may signal other health issues that can be treated.  When to call the healthcare provider  Call the healthcare provider if you notice a sudden change in your loved one s behavior or emotions. These changes may be due to dementia. But they could also signal other health problems that can be treated.   Date Last Reviewed: 3/1/2018    8556-9400 [employer]. [address] [place] [address]. All rights reserved. This information is not intended as a substitute for professional medical care. Always follow your healthcare professional's instructions.          The Difference Between Delirium and Dementia  Dementia and delirium are both health conditions that change a person s ability to think clearly and care for themselves. They do share some similar signs and symptoms. But they have different causes, treatment, and outcomes.  Delirium is seen as a medical emergency that needs to be treated right away. But it can often be mistaken for dementia. In some cases, these conditions can occur at the same time. Learn how the two are different, and what you can do to help a person who has signs of either or both.  What is delirium?    Delirium is a sudden change in a person s mental state that fluctuates over short periods of time. A person will have trouble paying attention or following a conversation. Thinking and speech may be confused, illogical, unclear, and unpredictable.    A person s mental state may vary from agitated and watchful to sluggish and sleepy.    Delirium is a medical emergency. Usually, there is some underlying cause.    Delirium is treated by finding the cause. Once the cause is treated, the delirium can go away.  What is dementia?  Dementia is  a range of signs that a person s brain is losing function. With dementia, a person s ability to think, remember, and communicate with others gets weaker over time. This process occurs over years and usually progresses slowly. At first, a person may sometimes be forgetful or confused. Questions will be asked over and over. Basic information may be forgotten. Over time, he or she will have trouble following directions and doing daily tasks. The person will have trouble talking with and understanding others. Eventually, a person with dementia may forget who people are and not know where he or she is. The person may also be sanders or restless.  Knowing the difference    Dementia Delirium   Common signs Signs include forgetfulness and confusion. The person will have trouble speaking with and understanding others. This occurs over long periods of time. Signs include sudden changes in mental state. Changes may range from agitation to tiredness.   When signs appear There is a slow change in mental state and behavior over months or years. There is a sudden change in mental state and behavior over hours or days.   Thinking and attention The person may often seem confused. Over time, the person s thinking will not be sensible or logical. As the disease worsens, the person will not be able to focus well. He or she will often not be able to talk with or understand others clearly. In some cases, the person may see or hear things that others can t (hallucinations). The person will not be able to remember events that just happened, and lose memory of events in the past. The person may not remember who people are, or where they are. The person may not recognize common objects. The person may be confused and disoriented. The person may have trouble focusing and talking with others. It is likely that the person will not be able to tell a healthcare provider about his or her symptoms. The person may see or hear things that others can t  (hallucinations). The person may not be able to remember something that just happened.   Getting a diagnosis  A person with signs of dementia or delirium will need to be diagnosed correctly. In some cases, you can help. You can tell the healthcare provider how the person's mental state is different from their normal state. This can help the provider diagnose the problem.  Healthcare providers will look at different parts of a person's health. They will look at what medicine a person is taking. They will find out if the person has an infection, or if he or she has an illness that has gotten worse. They may talk with the person to learn more about his or her mental state. And they may do tests to see if there may be a cause for delirium.  When to get medical help  Someone with dementia may also have signs of delirium. This can show that there is another problem.  If someone--with or without dementia--has sudden changes in mental state, call his or her healthcare provider right away. Or call 911 or your local emergency number. Tell the healthcare provider about the signs of delirium you have seen.  Date Last Reviewed: 4/1/2017 2000-2018 [employer]. [address] [place] [address]. All rights reserved. This information is not intended as a substitute for professional medical care. Always follow your healthcare professional's instructions.

## 2018-11-20 NOTE — TELEPHONE ENCOUNTER
I would recommend the ER as was suggested. I can't order acute brain imaging in our clinic.    Chip Barlow, JESSICAS, PA-C

## 2018-11-20 NOTE — ED AVS SNAPSHOT
[facility], Emergency Department    [address]    [place] [address]    Phone:  [phone]    Fax:  [phone]                                       Ghada Magana   MRN: [id number]    Department:  [facility], Emergency Department   Date of Visit:  11/20/2018           Patient Information     Date Of Birth          1935        Your diagnoses for this visit were:     Dementia without behavioral disturbance, unspecified dementia type        You were seen by Garrison Joes MD.        Discharge Instructions       Thank you for choosing [facility].     Please closely monitor for further symptoms. Return to the Emergency Department if you develop any new or worsening signs or symptoms.    If you received any opiate pain medications or sedatives during your visit, please do not drive for at least 8 hours.     Labs, cultures or final xray interpretations may still need to be reviewed.  We will call you if your plan of care needs to be changed.    Please follow up with your primary care physician or clinic as soon as possible.      For Caregivers: Daily Care for People With Dementia  Over time, people with dementia will need more and more help with daily tasks. These include eating meals, taking medicines, and getting enough exercise. They also include personal care needs, such as bathing and dressing. To reduce stress, make these activities part of a routine. Ask family and friends to lend a hand. And be aware that your loved one s abilities can change from day to day. If you have problems meeting your loved one s needs, it s time to get help. Talk with a  or local support agency--such as a local Alzheimer s Association chapter.   Activity and exercise  Regular activity is good for your loved one s body and mind. It may even help slow the progression of the disease. Keep to your loved one s old routines when possible. It also helps to:    Do things  together. Go for a walk, garden, or bake a cake. Basic, repetitive activities are good choices.    Be active as often as possible. This releases pent-up energy, which can reduce restlessness and improve sleep.    Include social activities. Take your loved one to see friends and family. But try to keep things simple. Loud noises, crowds, or too many people talking at once can be upsetting.  Taking medicines  Be sure all prescribed medicines are taken as directed. These tips can help:    Provide supervision if your loved one can't safely take medicines alone.    Set a routine so medicines are taken at the same time each day.    Ensure that all medicines are taken. A pillbox can help you keep track.    Plan ahead. Refill prescriptions before they run out.  Eating meals  At mealtime, serve healthy foods with plenty of fluids. These tips can also help:    Keep meals simple. Too many choices can be overwhelming. Try to maintain a calm, quiet atmosphere while you eat.    Place healthy snacks, such as fresh fruit, out where they can be seen.    Watch eating habits. People with dementia may eat too little or too much. Talk to the healthcare provider if you have concerns.    Try finger foods if regular meals become too difficult for your loved one to eat.  Dressing  People with dementia may have trouble choosing what to wear. It s OK if clothes don t always match. But if help is needed:    Choose clothing that is easy to put on and take off. Use shoes or slippers that fasten with a hook and loop.    Lay out a fresh outfit each day. Place clothes in the order they should be put on.    If more help is needed, hand over clothing items one at a time. Explain how each item should be put on.    Put dirty clothes away so they re not worn again.  Bathing and grooming  Getting your loved one to bathe can be a real challenge. Try these tips:    Treat bathing as a routine activity. But be flexible. A daily bath is probably  unrealistic.    Prepare bath items ahead of time, and be sure to test the water temperature.    Don't leave your loved one alone in the bath or shower.               Try visiting a barbershop or [employer] salon for help with hair washing, hair styling, and shaving.  Using the toilet  In later stages, people with dementia may have trouble controlling their bladder or bowel (incontinence). To ease problems:    Set a routine for using the toilet (for example, every 3 hours) and stick to it.    Limit beverages before bedtime to prevent accidents. A bedside commode may also help.    Be understanding if accidents happen. Your loved one may be as upset as you.    At one point it may be necessary to have them wear an adult diaper.      Talk to a healthcare provider if incontinence develops suddenly. It may signal other health issues that can be treated.  When to call the healthcare provider  Call the healthcare provider if you notice a sudden change in your loved one s behavior or emotions. These changes may be due to dementia. But they could also signal other health problems that can be treated.   Date Last Reviewed: 3/1/2018    4597-5983 The "[employer]. [address] [place], [place] [address]. All rights reserved. This information is not intended as a substitute for professional medical care. Always follow your healthcare professional's instructions.          The Difference Between Delirium and Dementia  Dementia and delirium are both health conditions that change a person s ability to think clearly and care for themselves. They do share some similar signs and symptoms. But they have different causes, treatment, and outcomes.  Delirium is seen as a medical emergency that needs to be treated right away. But it can often be mistaken for dementia. In some cases, these conditions can occur at the same time. Learn how the two are different, and what you can do to help a person who has signs of either or both.  What  is delirium?    Delirium is a sudden change in a person s mental state that fluctuates over short periods of time. A person will have trouble paying attention or following a conversation. Thinking and speech may be confused, illogical, unclear, and unpredictable.    A person s mental state may vary from agitated and watchful to sluggish and sleepy.    Delirium is a medical emergency. Usually, there is some underlying cause.    Delirium is treated by finding the cause. Once the cause is treated, the delirium can go away.  What is dementia?  Dementia is a range of signs that a person s brain is losing function. With dementia, a person s ability to think, remember, and communicate with others gets weaker over time. This process occurs over years and usually progresses slowly. At first, a person may sometimes be forgetful or confused. Questions will be asked over and over. Basic information may be forgotten. Over time, he or she will have trouble following directions and doing daily tasks. The person will have trouble talking with and understanding others. Eventually, a person with dementia may forget who people are and not know where he or she is. The person may also be sanders or restless.  Knowing the difference    Dementia Delirium   Common signs Signs include forgetfulness and confusion. The person will have trouble speaking with and understanding others. This occurs over long periods of time. Signs include sudden changes in mental state. Changes may range from agitation to tiredness.   When signs appear There is a slow change in mental state and behavior over months or years. There is a sudden change in mental state and behavior over hours or days.   Thinking and attention The person may often seem confused. Over time, the person s thinking will not be sensible or logical. As the disease worsens, the person will not be able to focus well. He or she will often not be able to talk with or understand others clearly. In  some cases, the person may see or hear things that others can t (hallucinations). The person will not be able to remember events that just happened, and lose memory of events in the past. The person may not remember who people are, or where they are. The person may not recognize common objects. The person may be confused and disoriented. The person may have trouble focusing and talking with others. It is likely that the person will not be able to tell a healthcare provider about his or her symptoms. The person may see or hear things that others can t (hallucinations). The person may not be able to remember something that just happened.   Getting a diagnosis  A person with signs of dementia or delirium will need to be diagnosed correctly. In some cases, you can help. You can tell the healthcare provider how the person's mental state is different from their normal state. This can help the provider diagnose the problem.  Healthcare providers will look at different parts of a person's health. They will look at what medicine a person is taking. They will find out if the person has an infection, or if he or she has an illness that has gotten worse. They may talk with the person to learn more about his or her mental state. And they may do tests to see if there may be a cause for delirium.  When to get medical help  Someone with dementia may also have signs of delirium. This can show that there is another problem.  If someone--with or without dementia--has sudden changes in mental state, call his or her healthcare provider right away. Or call 911 or your local emergency number. Tell the healthcare provider about the signs of delirium you have seen.  Date Last Reviewed: 4/1/2017 2000-2018 The [employer]. [address] [place] [address], [place] [address]. All rights reserved. This information is not intended as a substitute for professional medical care. Always follow your healthcare professional's  instructions.          Your next 10 appointments already scheduled     Jan 16, 2019 11:00 AM CST   Return Visit with Henrry De Anda MD   [facility] ([facility])    [facility] [address] [place] [id number] [phone]              24 Hour Appointment Hotline       To make an appointment at any [facility], call [phone] ([phone]). If you don't have a family doctor or clinic, we will help you find one. [facility] are conveniently located to serve the needs of you and your family.             Review of your medicines      Our records show that you are taking the medicines listed below. If these are incorrect, please call your family doctor or clinic.        Dose / Directions Last dose taken    amLODIPine 5 MG tablet   Commonly known as:  NORVASC   Dose:  5 mg   Quantity:  90 tablet        Take 1 tablet (5 mg) by mouth daily   Refills:  1        aspirin 81 MG tablet   Quantity:  100        1 tab po QD (Once per day)   Refills:  3        CALCIUM 600 + D 600-200 MG-UNIT Tabs   Quantity:  0        2 per day.   Refills:  0        carvedilol 25 MG tablet   Commonly known as:  COREG   Quantity:  180 tablet        Take 1 tablet (25 mg) by mouth 2 times daily (with meals)   Refills:  1        losartan 100 MG tablet   Commonly known as:  COZAAR   Dose:  100 mg   Quantity:  90 tablet        Take 1 tablet (100 mg) by mouth daily   Refills:  1        mirtazapine 30 MG tablet   Commonly known as:  REMERON   Dose:  30 mg   Quantity:  90 tablet        Take 1 tablet (30 mg) by mouth At Bedtime   Refills:  1        Multi-vitamin Tabs tablet   Generic drug:  multivitamin, therapeutic with minerals        take one daily   Refills:  0        sertraline 100 MG tablet   Commonly known as:  ZOLOFT   Dose:  100 mg   Quantity:  90 tablet        Take 1 tablet (100 mg) by mouth daily   Refills:  1        thiamine mononitrate 100 MG Tabs   Dose:  100 mg   Quantity:   30 tablet        Take 100 mg by mouth daily   Refills:  11        TYLENOL 325 MG tablet   Generic drug:  acetaminophen        2 TABLETS EVERY 4 HOURS AS NEEDED   Refills:  0        UNKNOWN MED DOSAGE        timolol eye gtts one drop in each eye twice daily   Refills:  0                Procedures and tests performed during your visit     Ammonia (on ice)    Comprehensive metabolic panel    EKG 12 lead    Head CT w/o contrast    TSH with free T4 reflex    Troponin I      Orders Needing Specimen Collection     Ordered          11/20/18 1307  UA with Microscopic reflex to Culture - STAT, [facility]: STAT, Needs to be Collected     Scheduled Task Status   11/20/18 1308 Collect UA with Microscopic reflex to Culture Open   Order Class:  PCU Collect                11/20/18 1307  Drug abuse screen 6 urine (chem dep) ([facility]) - STAT, [facility]: STAT, Needs to be Collected     Scheduled Task Status   11/20/18 1308 Collect Drug abuse screen 6 urine (chem dep) ([facility]) Open   Order Class:  PCU Collect                  Pending Results     No orders found from 11/18/2018 to 11/21/2018.            Pending Culture Results     No orders found from 11/18/2018 to 11/21/2018.            Pending Results Instructions     If you had any lab results that were not finalized at the time of your Discharge, you can call the ED Lab Result RN at [phone]. You will be contacted by this team for any positive Lab results or changes in treatment. The nurses are available 7 days a week from 10A to 6:30P.  You can leave a message 24 hours per day and they will return your call.        Thank you for choosing [facility]       Thank you for choosing [facility] for your care. Our goal is always to provide you with excellent care. Hearing back from our patients is one way we can continue to improve our services. Please take a few minutes to complete the written survey that you may receive in the mail after you visit with us. Thank you!        Care EveryWhere ID     This  is your Care EveryWhere ID. This could be used by other organizations to access your [facility] medical records  [id number]        Equal Access to Services     MARLINE OLIVER : Zoila Ambrose, kailee booth, maria isabel enamorado, julieta shelton. So [facility] [phone].    ATENCIÓN: Si habla español, tiene a dahl disposición servicios gratuitos de asistencia lingüística. Llame al [phone].    We comply with applicable federal civil rights laws and [place] laws. We do not discriminate on the basis of race, color, national origin, age, disability, sex, sexual orientation, or gender identity.            After Visit Summary       This is your record. Keep this with you and show to your community pharmacist(s) and doctor(s) at your next visit.

## 2018-11-20 NOTE — ED AVS SNAPSHOT
[facility], Emergency Department    [address]    [facility] [address]    Phone:  [phone]    Fax:  [phone]                                       Ghada Magana   MRN: [id number]    Department:  [facility], Emergency Department   Date of Visit:  11/20/2018           After Visit Summary Signature Page     I have received my discharge instructions, and my questions have been answered. I have discussed any challenges I see with this plan with the nurse or doctor.    ..........................................................................................................................................  Patient/Patient Representative Signature      ..........................................................................................................................................  Patient Representative Print Name and Relationship to Patient    ..................................................               ................................................  Date                                   Time    ..........................................................................................................................................  Reviewed by Signature/Title    ...................................................              ..............................................  Date                                               Time          22EPIC Rev 08/18

## 2018-11-21 ENCOUNTER — TELEPHONE (OUTPATIENT)
Dept: FAMILY MEDICINE | Facility: CLINIC | Age: 83
End: 2018-11-21

## 2018-11-21 LAB — INTERPRETATION ECG - MUSE: NORMAL

## 2018-11-21 NOTE — TELEPHONE ENCOUNTER
Reason for Call:  Medication or medication refill:    Do you use a [facility]?  Name of the pharmacy and phone number for the current request:  attach below    Name of the medication requested: Mirtazapine (REMERON) 30 MG tablet    Other request:. Rhett is wanting to let provider know that medication that was prescribed for his's wife was reduced to 15 mg by the pharmacy. Please call Rhett to discuses solutions.     Can we leave a detailed message on this number? YES    Phone number patient can be reached at: Home number on file [phone] (home)    Best Time: Anytime    Call taken on 11/21/2018 at 1:59 PM by Tanisha Kelsey

## 2018-11-21 NOTE — TELEPHONE ENCOUNTER
"Route to PCP to please advise plan for patient's ongoing/worsening dementia.    Phone call to patient's  Rhett to discuss other issue and he asked for  or other referral to assist with possible placement for patient due to cognitive decline.    Patient seen in ER yesterday and note indicates:  \"Per family there are not acute safety issues although they are thinking that in the near future they may need more services.  Attempted to connect them with social work but was unable to do so in the time frame acceptable to them, and they would like to be discharged and state they will follow-up through their primary care provider to discuss future plans for this patient.\"    Rhett states he would like to discuss options with his children as well. Wondering if he should start with clinic visit with PCP, discuss with social work/care coordinator, or other recommendations?    Adolfo Rees RN          "

## 2018-11-21 NOTE — TELEPHONE ENCOUNTER
Phone call to pharmacy. They were only able to dispense 7 of the 30 mg mirtazapine tablets due to low stock.  Staff member there states they expect more 30 mg tablets on Friday 11/23.  Updated Rhett. He will assure that 30 mg tablets can be dispensed on Friday. If not, he will call to clinic and we can either try a different pharmacy or change script to take (2) 15 mg tablets.    Adolfo Rees RN

## 2018-11-23 NOTE — TELEPHONE ENCOUNTER
Patient's  is calling back to speak with a  today if possible. Please call Rhett at [phone].    Thanks!  Aldair Guzman

## 2018-11-26 ENCOUNTER — PATIENT OUTREACH (OUTPATIENT)
Dept: CARE COORDINATION | Facility: CLINIC | Age: 83
End: 2018-11-26

## 2018-11-26 ASSESSMENT — ACTIVITIES OF DAILY LIVING (ADL): DEPENDENT_IADLS:: INDEPENDENT

## 2018-11-26 NOTE — PROGRESS NOTES
"Clinic Care Coordination Contact 11/26/18  Care Team Conversations-SW    Referral received to assist the pt's  with resources to assist him with his wife's cares. Phone call made to pt to introduce self and role and the purpose for my call. Pt's , Rhett, explained that he has been caring for his wife who has demential but she is becoming out of hand. He said she has started to oppose any sort of help and doesn't want to eat anymore. She yells \"no\" when he tries to help her with things that she is needing help with.     He described several scenarios where she was inappropriately dressed and was standing in the door way to go outside or she refused to pull her pants up in the house, etc.     Conversation with him about a variety of resources including services in the home, adult day, AL and LTC. We also discussed payment sources for those services. Pt's spouse will look over his finances with his daughter but from the sounds of it they will not be needing county resources in the near future.     He asked if I would call his daughter, Mariya and discuss the above information with her. Sariah was very helpful and supportive and agrees that AL/memory care is the best place moving forward. She provided this writer with verbal permission to send her a non secure email with a list of AL's to investigate. I encouraged her to call them and check availability and pricing as well as tour them.    She will contact SW as soon as they find a suitable AL and we will work on the clinic side to assist with her admission.    Goal Statement: I would like assistance moving to a memory care setting  Measure of Success: Whether or not she finds a place to move to  Supportive Steps to Achieve: SW to provide lists of AL's in the area and family will research and tour.  Barriers: Pt is declining quickly and may need faster intervention than finding an AL facility.  Strengths: Pt's family is supportive and wants to assist her with " this process.  Date to Achieve By: March 2019  Patient expressed understanding of goal: yes    UPDATE: Pt's daughter, Sariah, called this writer to inquire about the home care companies that were discussed. She is wondering if it would be helpful to hire a private pay company to assist temporarily while they are looking for an AL. SW emailed her a list of private pay companies with her verbal permission to send it non-secure.    Yen Hung, TERRIEW covering for TERRIE EdwardsW  Care Coordinator Social Work    [facility] [place]  [phone]  11/26/2018 3:31 PM

## 2018-11-26 NOTE — TELEPHONE ENCOUNTER
Team RN - please call  Neeraj.    I am sorry things have been so hard. I did review the ER notes in detail and it appears nothing was found on work up suggesting a medical cause other than her dementia.     I want them to be connect with our social work team urgently to discuss options and a plan as they will have more recommendations than I have right now.     I will route to Pamela Chica. Please let them know she will be in touch soon.    Thank you.  Chip Barlow, MPAS, PA-C

## 2018-11-26 NOTE — TELEPHONE ENCOUNTER
Reason for Call:  Other     Detailed comments:  Patients  is calling to speak with Jacinto Barlow. Patient has become uncontrollable and  can no longer deal with her.  Patient said things got bad over a week ago and because of the holidays it has been hard connecting with him and nursing staff.  Last Tuesday, spouse took patient to [facility] and they were there all day.  Spouse said Jacinto Barlow should be able to see the report from the hospital.  Spouse, Rhett would like to speak to Jacinto Barlow today.     Phone Number Patient can be reached at: Home number on file [phone] (home)    Best Time: any    Can we leave a detailed message on this number? YES    Call taken on 11/26/2018 at 9:14 AM by Page Quiroz

## 2018-11-26 NOTE — TELEPHONE ENCOUNTER
RN reached out via phone x3.  First two times busy signal, then third time kept ringing with no option for VM.  Will need to try again later.      Maria Elena Uriostegui RN

## 2018-11-29 ENCOUNTER — PATIENT OUTREACH (OUTPATIENT)
Dept: CARE COORDINATION | Facility: CLINIC | Age: 83
End: 2018-11-29

## 2018-11-29 NOTE — LETTER
[facility]  Complex Care Plan  About Me  Patient Name:  Ghada Magana    YOB: 1935  Age:     83 year old   [place] MRN:   [id number] Telephone Information:    Home Phone [phone]   Mobile [phone]       Address:    [address] [facility] [address] Email address:  No e-mail address on record      Emergency Contact(s)  Name Relationship Lgl Grd Work Phone Home Phone Mobile Phone   1. JT MAGANA Spouse  none [phone] none   2. AMIRA DUKES Daughter  none [phone]           Primary language:  English     needed? No   [employer]:  [phone] op. 1  Other communication barriers:    Preferred Method of Communication:  Not assessed   Current living arrangement:  Withj spouse   Mobility Status/ Medical Equipment:  Not assessed     Health Maintenance  Health Maintenance Reviewed:      My Access Plan  Medical Emergency 911   Primary Clinic Line [facility] [phone]   24 Hour Appointment Line [phone] or  [phone]) (toll-free)   24 Hour Nurse Line [phone] (toll-free)   Preferred Urgent Care  Not assessed    Preferred Hospital  Not assessed    Preferred Pharmacy [facility] [address] [place] [address]     Behavioral Health Crisis Line The National Suicide Prevention Lifeline at 1-952.600.6934 or 911     My Care Team Members    Patient Care Team       Relationship Specialty Notifications Start End    Chip aBrlow PA-C PCP - General   9/17/03     Phone: [phone] Fax: [phone] [address] [facility] [address]    Chip Barlow PA-C Referring Physician Physician Assistant  4/18/17     Comment:  Referring to Neurology    Phone: [phone] Fax: [phone]         [address] [place] [address]    Dao Ordonez MD MD Neurology  4/18/17     Phone: [phone] Fax:  [phone]         [address] [facility] [address] [facility] [address]    Kenzie Campo, RN Nurse Coordinator Neurology Admissions 6/9/17     Phone: [phone] Pager: [phone] Fax: [phone]       Pamela Coto BSW Lead Care Coordinator Primary Care - CC  11/29/18     Phone: [phone] Fax: [phone]                My Care Plans  Self Management and Treatment Plan  Goals and (Comments)  Goals        Lifestyle    Goal Statement: I would like assistance moving to a memory care setting     Notes - Note created  11/29/2018  4:17 PM by Pamela Coto BSW    Goal from SW 11/26/2018:     Goal Statement: I would like assistance moving to a memory care setting  Measure of Success: Whether or not she finds a place to move to  Supportive Steps to Achieve:  to provide lists of AL's in the area and family will research and tour.  Barriers: Pt is declining quickly and may need faster intervention than finding an AL facility.  Strengths: Pt's family is supportive and wants to assist her with this process.  Date to Achieve By: March 2019  Patient expressed understanding of goal: yes             Action Plans on File: None           Advance Care Plans/Directives Type:        My Medical and Care Information  Problem List   Patient Active Problem List   Diagnosis     Congenital anomaly of lung     Osteoporosis     Anemia     Hyperlipidemia LDL goal <130     Hypertension goal BP (blood pressure) < 140/90     Advanced directives, counseling/discussion     Chronic cough     MCI (mild cognitive impairment) with memory loss     Mycobacterium avium infection (H)     Mild dementia     Mild major depression (H)          Care Coordination Start Date: 11/26/2018   Frequency of Care Coordination:  2 weeks    Form Last Updated: 11/29/2018

## 2018-11-29 NOTE — LETTER
Health Care Home - Access Care Plan    About Me  Patient Name:  Ghada Magana    YOB: 1935  Age:                             83 year old   [place] MRN:            [id number] Telephone Information:     Home Phone [phone]   Mobile [phone]       Address:    [address] [facility] [address] Email address:  No e-mail address on record      Emergency Contact(s)  Name Relationship Lgl Grd Work Phone Home Phone Mobile Phone   1. JT MAGANA Spouse  none [phone] none   2. AMIRA DUKES Daughter  none [phone]             Health Maintenance:  Not assessed     My Access Plan  Medical Emergency 911   Questions or concerns during clinic hours Primary Clinic Line, I will call the clinic directly: [facility] [phone]   24 Hour Appointment Line [phone] or  [phone]) (toll free)   24 Hour Nurse Line [phone] (toll free)   Questions or concerns outside clinic hours 24 Hour Appointment Line, I will call the after-hours on-call line:   [facility] [phone] or [phone]) (toll-free)   Preferred Urgent Care  Not assessed    Preferred Hospital  Not assessed   Preferred Pharmacy [facility] [address] [place] [address]     Behavioral Health Crisis Line The National Suicide Prevention Lifeline at 1-917.803.7182 or 911     My Care Team Members  Patient Care Team       Relationship Specialty Notifications Start End    Chip Barlow PA-C PCP - General   9/17/03     Phone: [phone] Fax: [phone]         [address] [facility] [address]    Chip Barlow PA-C Referring Physician Physician Assistant  4/18/17     Comment:  Referring to Neurology    Phone: [phone] Fax: [phone]         [address] [facility] [address]    Dao Ordonez MD MD Neurology  4/18/17     Phone: [phone] Fax: [phone]          [address] [facility] [address] [facility] [address]    Kenzie Campo, RN Nurse Coordinator Neurology Admissions 6/9/17     Phone: [phone] Pager: [phone] Fax: [phone]       Pamela Coto BSW Lead Care Coordinator Primary Care - CC  11/29/18     Phone: [phone] Fax: [phone]               My Medical and Care Information  Problem List   Patient Active Problem List   Diagnosis     Congenital anomaly of lung     Osteoporosis     Anemia     Hyperlipidemia LDL goal <130     Hypertension goal BP (blood pressure) < 140/90     Advanced directives, counseling/discussion     Chronic cough     MCI (mild cognitive impairment) with memory loss     Mycobacterium avium infection (H)     Mild dementia     Mild major depression (H)

## 2018-11-29 NOTE — LETTER
[place] CARE COORDINATION    November 29, 2018    Ghada Magana  [address] [facility] [address]      Dear Ghada,    I am a clinic care coordinator who works with MERON EASON PA-C at [facility]. I wanted to introduce myself and provide you with my contact information so that you can call me with questions or concerns about your health care. Below is a description of clinic care coordination and how I can further assist you.     The clinic care coordinator is a registered nurse and/or  who understand the health care system. The goal of clinic care coordination is to help you manage your health and improve access to the [facility] in the most efficient manner. The registered nurse can assist you in meeting your health care goals by providing education, coordinating services, and strengthening the communication among your providers. The  can assist you with financial, behavioral, psychosocial, chemical dependency, counseling, and/or psychiatric resources.    Please feel free to contact me at [phone] with any questions or concerns. We at [facility] are focused on providing you with the highest-quality healthcare experience possible and that all starts with you.     Sincerely,     Pamela Coto, LEBRON, MSW    Clinical Care Coordination  [facility]  [phone]    Enclosed: I have enclosed a copy of a 24 Hour Access Plan. This has helpful phone numbers for you to call when needed. Please keep this in an easy to access place to use as needed.

## 2018-11-29 NOTE — TELEPHONE ENCOUNTER
Please see new SW encounter.    LEBRON Lennon, HUNTER   [facility]   [phone]  11/29/2018 4:02 PM

## 2018-11-30 ASSESSMENT — ACTIVITIES OF DAILY LIVING (ADL)
DEPENDENT_IADLS:: CLEANING;COOKING;LAUNDRY;SHOPPING;MEAL PREPARATION;MEDICATION MANAGEMENT;TRANSPORTATION;MONEY MANAGEMENT

## 2018-12-02 DIAGNOSIS — I10 HYPERTENSION GOAL BP (BLOOD PRESSURE) < 140/90: ICD-10-CM

## 2018-12-03 NOTE — TELEPHONE ENCOUNTER
"Requested Prescriptions   Pending Prescriptions Disp Refills     amLODIPine (NORVASC) 5 MG tablet [Pharmacy Med Name: AmLODIPine Besylate Oral Tablet 5 MG]  Last Written Prescription Date:  12/8/2017  Last Fill Quantity: 90 tablet,  # refills: 1   Last office visit: 11/15/2018 with prescribing provider:  CHAU Barlow   Future Office Visit:   Next 5 appointments (look out 90 days)     Jan 16, 2019 11:00 AM CST   Return Visit with Henrry De Anda MD   [facility] ([facility])    [address] [place] [address] [phone]               90 tablet 0     Sig: TAKE ONE TABLET BY MOUTH ONE TIME DAILY    Calcium Channel Blockers Protocol  Failed    12/2/2018 12:49 PM       Failed - Blood pressure under 140/90 in past 12 months    BP Readings from Last 3 Encounters:   11/20/18 145/78   11/15/18 130/58   10/25/18 (!) 80/42          Passed - Recent (12 mo) or future (30 days) visit within the authorizing provider's specialty    Patient had office visit in the last 12 months or has a visit in the next 30 days with authorizing provider or within the authorizing provider's specialty.  See \"Patient Info\" tab in inbasket, or \"Choose Columns\" in Meds & Orders section of the refill encounter.         Passed - Patient is age 18 or older       Passed - No active pregnancy on record       Passed - Normal serum creatinine on file in past 12 months    Recent Labs   Lab Test  11/20/18   1334   CR  0.60          Passed - No positive pregnancy test in past 12 months          "

## 2018-12-04 RX ORDER — AMLODIPINE BESYLATE 5 MG/1
TABLET ORAL
Qty: 90 TABLET | Refills: 1 | Status: SHIPPED | OUTPATIENT
Start: 2018-12-04 | End: 2019-06-11

## 2018-12-21 ENCOUNTER — PATIENT OUTREACH (OUTPATIENT)
Dept: CARE COORDINATION | Facility: CLINIC | Age: 83
End: 2018-12-21

## 2018-12-21 NOTE — PROGRESS NOTES
Clinic Care Coordination Contact--Social Work Follow Up Call/Assessment   Care Team Conversations    Psychosocial: ZAINA had sent a blank Consent to Communicate form to Patient and spouse, these were returned signed, dated and completed.  Given to  to scan and they will send to abstract.      ZAINA had recently discussed additional home services with Patient.  Referral made to [facility], Patient's spouse was accepting of these services as thought they may be partially (or more) covered under [place] benefits.  [facility] works with [employer] to assist with application process.  ZAINA called [facility] for update ([phone]) and spoke with Le.  Per Le, Neeraj met with family and Patient was to be receiving overnight care.  This occurred for one night.  Patient's spouse did not want someone in the night when he himself is often up until 2 am.  The shift is currently for 2 and 1/2 hours with understanding from Patient's spouse that this could be shorter.  Patient is receiving this care every other day for evening cares.  There is confusion regarding whom to accept care requests from, Patient's daughter Sariah is informing Isis that more care is needed while Patient's spouse does not want this.  Le informed that Patient's spouse is now in the hospital and a daughter is caring for Patient in the interim.  Le could not speak to if Patient is applying for [employer].  Le will check with Neeraj or have Neeraj call ZAINA with this information.      Plan: 1) ZAINA will call Patient's daughter Sariah in 2 weeks for update and needs assessment.  Consent to Communicate on file     LEBRON Lennon, HUNTER   [facility]   [phone]  12/21/2018 12:25 PM

## 2019-01-15 NOTE — PROGRESS NOTES
"ESTABLISHED PATIENT NEUROLOGY NOTE    DATE OF VISIT: 1/16/2019  CLINIC LOCATION: [facility]  MRN: [id number]  PATIENT NAME: Ghada Magana  YOB: 1935    PCP: MERON EASON PA-C    REASON FOR VISIT:   Chief Complaint   Patient presents with     Follow Up     Neuropsy     SUBJECTIVE:                                                      HISTORY OF PRESENT ILLNESS: Patient is here for follow up regarding cognitive impairment.  Last visit was on 10/10/2018.  At that time, I referred the patient for repeat neuropsychologic evaluation.  Please refer to my initial/other prior notes for further information.  The patient is accompanied by her , who participates in interview and serves as a collateral source of information.    Since the last visit, the patient reports that her memory is \"okay\".  She denies interval development of new neurological symptoms.    Per 's report, the patient became confused, disoriented, and at times agitated refusing to eat and dress or do anything in November 2018 (at one point the  felt that he is not able to deal with the patient at home anymore), eventually resulting in emergency room visit on 11/20/2018.  At that time, head CT was negative for acute intracranial pathology.  Generalized atrophy of the brain and small vessel ischemic disease changes were noted.  Basic labs were also unrevealing, including CMP, troponin level, and TSH.  Ammonia specimen  hemolyzed.  The patient was discharged home.  The patient was started on Remeron by primary care provider.  Gradually, her confusion resolved, though cognitive decline continues.    Her neuropsychologic evaluation from 11/02/2018 demonstrated deficits suggesting dysfunction of the frontal and bilateral temporal brain regions consistent with mixed dementia syndrome.  Mild decline in her condition noticed compared to the exam one year ago.  Depression and anxiety felt possibly contributory, " but not fully responsive for her cognitive dysfunction.  Her CPT was 4.6/5.6 on 08/14/2017.    On review of systems, patient endorses no additional active complaints. Medications, allergies, family and social history were also reviewed. There are no changes reported by patient.  REVIEW OF SYSTEMS:                                                    10-system review was completed. Pertinent positives are included in HPI. The remainder of ROS is negative.  EXAM:                                                    Physical Exam:   Vitals: /86 (BP Location: Left arm, Patient Position: Sitting, Cuff Size: Adult Regular)   Pulse 84   Temp 97.8  F (36.6  C) (Oral)   Resp 16   Wt 46.7 kg (103 lb)   SpO2 95%   BMI 18.84 kg/m      General: pt is in NAD, cooperative.  Skin: normal turgor, moist mucous membranes, no lesions/rashes noticed.  HEENT: ATNC, white sclera, normal conjunctiva.  Respiratory: Symmetric lung excursion, no accessory respiratory muscle use.  Abdomen: Non distended.  Neurological: awake, cooperative, follows commands, no exam changes compared to the last visit.  DATA:   Neuropsychologic evaluation: I personally reviewed   ASSESSMENT and PLAN:                                                    Assessment: 83-year-old female patient  with cognitive decline presents for follow-up after the completion of repeat neuropsychologic testing, which was consistent with mild dementia.  At this point, her clinical presentation is most consistent with Alzheimer's disease, neurodegenerative condition with no cure, but available treatments that could slow down the cognitive decline.  I think it would be reasonable to obtain a repeat CPT to help planning for appropriate living arrangement, especially in light of recent confusion episode.  We also discussed available treatments (donepezil) along with common side effects.  The patient is willing to proceed.  In addition, the patient was advised to continue working  with primary care provider on proper treatment of her mental health conditions that might be contributory to her cognitive dysfunction.    Diagnoses:    ICD-10-CM    1. Late onset Alzheimer's disease without behavioral disturbance G30.1 donepezil (ARICEPT) 5 MG tablet    F02.80 OCCUPATIONAL THERAPY REFERRAL     Plan: At today's visit we thoroughly discussed results of neuropsychological testing that were consistent with dementia.  We also reviewed in detail most likely diagnosis (Alzheimer's disease), available treatment options and the plan, which includes:  Orders Placed This Encounter   Procedures     OCCUPATIONAL THERAPY REFERRAL     The following medications were ordered: Donepezil (ARICEPT) 5 MG tablet: I counseled the patient to take 1 tablet (5 mg) by mouth at bedtime and contact my clinic with any intolerable side effects.  We will increase the dose at the next visit if she tolerates it well.    I also advised the patient to stay physically and mentally active with particular emphasis on daily mentally stimulating activities of her choice (such as crosswords, puzzles, sudoku, etc.), stretching exercises, walking, and healthy eating.    Additional recommendations after the work-up.    Next follow-up appointment is in the next 3-4 weeks or earlier if needed.    Total Time: 45 minutes with > 50% spent counseling the patient and her  on stated above assessment and recommendations.    Henrry De Anda MD  HP/ Neurology

## 2019-01-16 ENCOUNTER — OFFICE VISIT (OUTPATIENT)
Dept: NEUROLOGY | Facility: CLINIC | Age: 84
End: 2019-01-16
Payer: MEDICARE

## 2019-01-16 VITALS
RESPIRATION RATE: 16 BRPM | DIASTOLIC BLOOD PRESSURE: 86 MMHG | BODY MASS INDEX: 18.84 KG/M2 | HEART RATE: 84 BPM | WEIGHT: 103 LBS | OXYGEN SATURATION: 95 % | SYSTOLIC BLOOD PRESSURE: 138 MMHG | TEMPERATURE: 97.8 F

## 2019-01-16 DIAGNOSIS — G30.1 LATE ONSET ALZHEIMER'S DISEASE WITHOUT BEHAVIORAL DISTURBANCE (H): Primary | ICD-10-CM

## 2019-01-16 DIAGNOSIS — F02.80 LATE ONSET ALZHEIMER'S DISEASE WITHOUT BEHAVIORAL DISTURBANCE (H): Primary | ICD-10-CM

## 2019-01-16 PROCEDURE — 99215 OFFICE O/P EST HI 40 MIN: CPT | Performed by: PSYCHIATRY & NEUROLOGY

## 2019-01-16 RX ORDER — DONEPEZIL HYDROCHLORIDE 5 MG/1
5 TABLET, FILM COATED ORAL AT BEDTIME
Qty: 45 TABLET | Refills: 0 | Status: SHIPPED | OUTPATIENT
Start: 2019-01-16 | End: 2019-02-27

## 2019-01-16 NOTE — PATIENT INSTRUCTIONS
AFTER VISIT SUMMARY (AVS):    At today's visit we thoroughly discussed results of neuropsychological testing that were consistent with dementia, most likely diagnosis (Alzheimer's disease), available treatment options and the plan, which includes:  Orders Placed This Encounter   Procedures     OCCUPATIONAL THERAPY REFERRAL     The following medications were ordered: Donepezil (ARICEPT) 5 MG tablet: Take 1 tablet (5 mg) by mouth at bedtime.  Please contact my clinic with any intolerable side effects.  We will increase the dose at the next visit if you tolerate it well.    Stay physically and mentally active with particular emphasis on daily mentally stimulating activities of your choice (such as crosswords, puzzles, sudoku, etc.), stretching exercises, walking, and healthy eating.    Additional recommendations after the work-up.    Next follow-up appointment is in the next 3-4 weeks or earlier if needed.    Please do not hesitate to call me with any questions or concerns.    Thanks.

## 2019-01-25 ENCOUNTER — PATIENT OUTREACH (OUTPATIENT)
Dept: CARE COORDINATION | Facility: CLINIC | Age: 84
End: 2019-01-25

## 2019-01-25 NOTE — PROGRESS NOTES
Clinic Care Coordination Contact--Social Work Chart Review    Situation: Patient chart reviewed by care coordinator.    Background: Care team request for [facility] to assist with supportive home resources, ZAINA discussed with Patient's family, [facility] initial assessment done and minimal services in place with [facility].  Spouse did not see additional support was needed in the home, he was willing to allow assessment as services could eventually be covered under VA benefit.  [facility] will assist with this application process     Assessment: Patient seen by neurology on 1/16/2019.  Per this note, repeat neuropsychology assessment, deficits indicate possible dysfunctions in frontal and bilateral temporal brain regions consistent with mixed dementia syndrome.  CPT in 2017 was 4.6/5.6, suggested this be repeated for assistance of planning for most appropriate living arrangement for Patient.      Plan/Recommendations: SW will call Patient's daughter in 3-4 weeks for update and needs assessment     Pamela Coto, LEBRON, MSW   [facility]   [phone]  1/25/2019 3:10 PM

## 2019-02-19 ENCOUNTER — PATIENT OUTREACH (OUTPATIENT)
Dept: CARE COORDINATION | Facility: CLINIC | Age: 84
End: 2019-02-19

## 2019-02-19 NOTE — PROGRESS NOTES
Clinic Care Coordination Contact--Social Work Follow Up Call/Assessment   Care Team Conversations    Psychosocial: Care team request for SW to assist with supportive home resources, SW discussed with Patient's family, [facility] initial assessment done and minimal services in place with [facility].  Spouse did not see additional support was needed in the home, he was willing to allow assessment as services could eventually be covered under VA benefit.  [facility] will assist with this application process      Patient seen by neurology on 1/16/2019.  Per this note, repeat neuropsychology assessment, deficits indicate possible dysfunctions in frontal and bilateral temporal brain regions consistent with mixed dementia syndrome.  CPT in 2017 was 4.6/5.6, suggested this be repeated for assistance of planning for most appropriate living arrangement for Patient.      Call to [facility] ([phone]) to obtain update on services.  Per Le, Patient has no current services.  They have reached out to Patient's spouse to discuss and he declines need for services.      Plan: 1) SW will close to Care Coordination and update PCP    Pamela Coto, TERRIEW, MSW   [facility]   [phone]  2/19/2019 12:36 PM

## 2019-02-23 DIAGNOSIS — I10 HYPERTENSION GOAL BP (BLOOD PRESSURE) < 140/90: ICD-10-CM

## 2019-02-23 DIAGNOSIS — F32.0 MILD MAJOR DEPRESSION (H): ICD-10-CM

## 2019-02-25 DIAGNOSIS — F02.80 LATE ONSET ALZHEIMER'S DISEASE WITHOUT BEHAVIORAL DISTURBANCE (H): ICD-10-CM

## 2019-02-25 DIAGNOSIS — G30.1 LATE ONSET ALZHEIMER'S DISEASE WITHOUT BEHAVIORAL DISTURBANCE (H): ICD-10-CM

## 2019-02-25 RX ORDER — CARVEDILOL 25 MG/1
TABLET ORAL
Qty: 180 TABLET | Refills: 0 | Status: SHIPPED | OUTPATIENT
Start: 2019-02-25 | End: 2019-06-17

## 2019-02-25 RX ORDER — SERTRALINE HYDROCHLORIDE 100 MG/1
TABLET, FILM COATED ORAL
Qty: 90 TABLET | Refills: 0 | Status: SHIPPED | OUTPATIENT
Start: 2019-02-25 | End: 2019-06-11

## 2019-02-25 NOTE — TELEPHONE ENCOUNTER
"Requested Prescriptions   Pending Prescriptions Disp Refills     donepezil (ARICEPT) 5 MG tablet  Last Written Prescription Date:  1/16/2019  Last Fill Quantity: 45 tablet,  # refills: 0   Last Office Visit: 11/15/2018   Future Office Visit:    Next 5 appointments (look out 90 days)    Feb 27, 2019  1:00 PM CST  Return Visit with Henrry De Anda MD  [facility] ([facility]) [address] [place] [address] [phone]        45 tablet 0     Sig: Take 1 tablet (5 mg) by mouth At Bedtime 6 weeks (42 Days) supply    Miscellaneous Dementia Agents Passed - 2/25/2019  2:43 PM       Passed - Recent (12 mo) or future (30 days) visit within the authorizing provider's specialty    Patient had office visit in the last 12 months or has a visit in the next 30 days with authorizing provider or within the authorizing provider's specialty.  See \"Patient Info\" tab in inbasket, or \"Choose Columns\" in Meds & Orders section of the refill encounter.           Passed - Medication is active on med list       Passed - Patient is 18 years of age or older          "

## 2019-02-25 NOTE — TELEPHONE ENCOUNTER
"Requested Prescriptions   Pending Prescriptions Disp Refills     sertraline (ZOLOFT) 100 MG tablet [Pharmacy Med Name: Sertraline HCl Oral Tablet 100 MG]  Last Written Prescription Date:  8/23/2018  Last Fill Quantity: 90 tablet,  # refills: 1   Last office visit: 11/15/2018 with prescribing provider:  CHAU Barlow   Future Office Visit:   Next 5 appointments (look out 90 days)    Feb 27, 2019  1:00 PM CST  [facility] Visit with Henrry De Anda MD  [facility] [address] [place] [id number] [phone]          90 tablet 0     Sig: Take 1 tablet by mouth once daily.    SSRIs Protocol Failed - 2/23/2019  2:58 PM       Failed - PHQ-9 score less than 5 in past 6 months    Please review last PHQ-9 score.   No flowsheet data found.  No flowsheet data found.         Passed - Medication is active on med list       Passed - Patient is age 18 or older       Passed - No active pregnancy on record       Passed - No positive pregnancy test in last 12 months       Passed - Recent (6 mo) or future (30 days) visit within the authorizing provider's specialty    Patient had office visit in the last 6 months or has a visit in the next 30 days with authorizing provider or within the authorizing provider's specialty.  See \"Patient Info\" tab in inbasket, or \"Choose Columns\" in Meds & Orders section of the refill encounter.                    carvedilol (COREG) 25 MG tablet [Pharmacy Med Name: Carvedilol Oral Tablet 25 MG]  Last Written Prescription Date:  9/4/2018  Last Fill Quantity: 180 tablet,  # refills: 1   Last office visit: 11/15/2018 with prescribing provider:  CHAU Barlow   Future Office Visit:   Next 5 appointments (look out 90 days)    Feb 27, 2019  1:00 PM CST  Return Visit with Henrry De Anda MD  [facility] [address] [place] [id number] [phone]        180 tablet 0     " "Sig: TAKE ONE TABLET (25MG) BY MOUTH TWO TIMES DAILY (WITH MEALS)    Beta-Blockers Protocol Passed - 2/23/2019  2:58 PM       Passed - Blood pressure under 140/90 in past 12 months    BP Readings from Last 3 Encounters:   01/16/19 138/86   11/20/18 145/78   11/15/18 130/58          Passed - Patient is age 6 or older       Passed - Recent (12 mo) or future (30 days) visit within the authorizing provider's specialty    Patient had office visit in the last 12 months or has a visit in the next 30 days with authorizing provider or within the authorizing provider's specialty.  See \"Patient Info\" tab in inbasket, or \"Choose Columns\" in Meds & Orders section of the refill encounter.             Passed - Medication is active on med list          "

## 2019-02-25 NOTE — TELEPHONE ENCOUNTER
PHQ-9 score:  No flowsheet data found.      Coreg: Prescription approved per [facility] Refill Protocol.    Sertraline: Routing refill request to provider for review/approval because:  No PHQ9 on file, no plan at November visit    Lita Koch RN  [facility]

## 2019-02-26 ENCOUNTER — HOSPITAL ENCOUNTER (OUTPATIENT)
Dept: OCCUPATIONAL THERAPY | Facility: CLINIC | Age: 84
Setting detail: THERAPIES SERIES
End: 2019-02-26
Attending: PSYCHIATRY & NEUROLOGY
Payer: MEDICARE

## 2019-02-26 DIAGNOSIS — F02.80 LATE ONSET ALZHEIMER'S DISEASE WITHOUT BEHAVIORAL DISTURBANCE (H): ICD-10-CM

## 2019-02-26 DIAGNOSIS — G30.1 LATE ONSET ALZHEIMER'S DISEASE WITHOUT BEHAVIORAL DISTURBANCE (H): ICD-10-CM

## 2019-02-26 PROCEDURE — 96125 COGNITIVE TEST BY HC PRO: CPT | Mod: GO | Performed by: OCCUPATIONAL THERAPIST

## 2019-02-26 PROCEDURE — 97165 OT EVAL LOW COMPLEX 30 MIN: CPT | Mod: GO | Performed by: OCCUPATIONAL THERAPIST

## 2019-02-26 PROCEDURE — 97535 SELF CARE MNGMENT TRAINING: CPT | Mod: GO | Performed by: OCCUPATIONAL THERAPIST

## 2019-02-26 ASSESSMENT — ACTIVITIES OF DAILY LIVING (ADL): COMMUNITY_MOBILITY: DOES NOT DRIVE

## 2019-02-26 NOTE — PROGRESS NOTES
"   02/26/19 1500   Signing Clinician's Name / Credentials   Signing clinician's name / credentials Leanne Frank OTR/L,ATP   Session Number   Session Number CPT eval and rec.   OT Goal 1   Goal Identifier CPT   Goal Description Patient will participate in CPT in order to determine safety and home reccomendations.   Target Date 02/26/19   Self Care/Home Management   Treatment Detail Reviewed how spouse can assist with safety.  Rec. he remind and watch her pills more.  Rec. no driving still.: keep a routine, be consistent, do not argue or bring up memory changes, and be patient. Also educated on progressive memory process and need to keepy Pat safe and from wandering.  Challenging for therapist to educate due to Spouse  as he chose to talk over her at times and didn't appear to have a full understanding of safety, insight, throughness, and problem solving deficits.     Self-Care/Home Mgmt/ADL, Compensatory, Meal Prep Minutes (00199) 30 Minutes   Skilled Intervention Rec. based on cog status and CPT test   Patient Response Daughter and Spouse receptive to info but not fully understanding cognitive changes and needs.  Spouse reports trying to reason, have her \"think\", and thinks she will remember.   Progress goals met no further Ot needed at this time   Assessments Completed   Assessments Completed CPT 4.0/5.6   Standardized Cognitive Testing Minutes (88046) 45 Minutes   Education   Learner Patient;Significant other;Family   Readiness Acceptance;Nonacceptance   Method Demonstration;Explanation;Literature;Booklet/handout   Response Verbalizes understanding;Demonstrates understanding;Needs reinforcement;No evidence of learning   Education Notes Issued handout for 4.0 CPT level   Total Session Time   Timed Code Treatment Minutes 60   Total Treatment Time (sum of timed and untimed services) 90     "

## 2019-02-26 NOTE — PROGRESS NOTES
Cognitive Performance Test    SUMMARY OF TEST:    The Cognitive Performance Test (CPT) is a standardized performance-based assessment to measure working memory/executive function processing capacities that underlie functional performance. Subtasks include common basic and instrumental activities of daily living (ADL/IADL) which are rated based on the manner in which patients respond to task demands of varying complexity. The total CPT score describes a level of functioning that indicates how information is processed, implications for functional activities, potential safety risks and a recommended level of supervision or assist based on cognitive function. The highest total score on this test is in the range of 5.6 to 5.8.    DATE OF TESTING: August 14, 2017    RESULTS OF TESTING:                                                                                         CPT Subtest Results    MEDBOX:4/6 SHOP/GLOVES: 3.5/6 PHONE: NT/6   WASH:  4/5 TRAVEL: 4/6 TOAST: 5/5   DRESS: 4/5   TOTAL CPT SCORE:  24.5/33     Average CPT Score  4.0/5.6    INTERPRETATION OF TEST RESULTS:    Based on the Cognitive Performance Test, this patient scored at CPT Level 4.0  See CPT Levels reference below.    Summary of functional cognitive status:   Patient requires task set up for most activities. Unaware of memory deficits and errors in task and unable to correct with specific cues.      Factors affecting performance:  No additional problems noted    Recommendations:    24 hour supervision with routine and safety measure for wandering.                                            TIME ADMINISTERING TEST: 30    TIME FOR INTERPRETATION AND PREPARATION OF REPORT: 15    TOTAL TIME: 15    CPT Levels Reference:    Patient's Average CPT Score:  4.0                                                                                                                                                Individual scores range along a continuum as outlined  "below.  In addition to cognitive status, other factors may affect safety in a home environment.  Please refer to specific recommendations for this patient.    ___5.6-5.8  Normal functioning (absence of cognitive-functional disability).  Independent in managing personal affairs, monitors and directs own behavior.  Uses complex information to carry out daily activities with safety and accuracy.    Proficient with instrumental activities of daily living (IADL) and learning new activity.  Problems are anticipated, errors are avoided, and consequences of actions are considered.      ___5.0   Mild cognitive-functional disability; deficits in working memory and executive thought processes. Difficulty using complex information. Problems may be observed with recent memory, judgment, reasoning and planning ahead. May be impulsive or have difficulty anticipating consequences.  Safety:  May require assistance to plan ahead; or to manage complex medication schedules, appointments or finances.  Hazardous activities may need to be monitored or limited.  ADL:  Mild functional decline.  Able to complete basic self-care and routine household tasks.  May have difficulty with complex daily tasks such as reading, writing, meal preparation, shopping or driving.   Learns through hands on teaching. Self-centered behavior or difficulty considering the needs of others may be seen related to trouble seeing the  whole picture\". Can appear disorganized or uninhibited.    __4.5  Mild to moderate cognitive-functional disability. Significant deficits in working memory and executive thought processes. Judgment, reasoning and planning show obvious impairment.  Distractible with inability to shift attention/actions given competing stimuli.  Difficulty with problem solving and managing details. Complex daily tasks performed with inconsistency, difficulty, or error.     Safety:  Medications should be monitored, stove use may require supervision, and " driving ability may be affected.  Impaired safety awareness with inability to anticipate potential problems.  May not recognize or respond to emergent situations. Requires frequent check-in support.   ADL:  Mild difficulty with simple everyday self-care tasks. Benefits from structured, routine activity.  Will likely need reminders to complete tasks outside of the routine. Requires assistance with planning and IADL tasks like shopping and finances. Learns concrete tasks through repetition, but performance may not generalize. Tends to be impulsive with poor insight. Self centered behavior or inability to consider the needs of others is common.    _x__4.0  Moderate cognitive-functional disability; abstract to concrete thought processes. Working memory and executive function impairments are obvious. Difficulty with planning and problem solving.  Behavior is goal-directed, but unable to follow multi-step directions, is easily distracted, and may not recognize mistakes.  Inability to anticipate hazards or understand precautions.  Safety:  Recommend 24-hour supervision for safety. Supervision needed for medication management and for hazardous activities. May not be able to follow a restricted diet. Can get lost in unfamiliar surroundings. Generally, persons functioning at level 4 should not be driving.   ADL:  Some decline in quality or frequency of ADL.  [place] enhanced by use of a routine, simple concrete directions, and caregiver set-up of needed items. Complex tasks such as money or home management typically requires assistance.  Relies heavily on vision to guide behavior; will ignore objects/hazards not in plain sight and can be distracted by irrelevant objects. Often has poor insight.  Able to carry out social conversation and may verbally  cover  for deficits leading caregivers to believe they are capable of functioning independently.       ___3.5  Moderate cognitive-functional disability; increased cues  needed for task completion. Aware of concrete task steps but needs prompting or cues to initiate and complete simple tasks. Attention span is limited, simple directions may need to be repeated, and re-focus to a topic or task may be required.  Safety:  24-hour supervision required for safety and for assistance with daily tasks. Assistance required with medications, and access to medication should be limited. Meals, nutrition and dietary restrictions need to be monitored.  All hazardous activities should be restricted or supervised. Should not drive. Prone to wandering and can become lost.  ADL:  Moderate functional decline. Familiar tasks usually requires set-up of supplies and directions to complete steps. May need objects handed to them for task initiation. Function best with a set schedule in familiar surroundings with familiar people. All complex tasks must be done by others. Vocabulary is diminished and speech often unfocused.       Electronically signed by:  Leanne MCKEON, ATP       Occupational Therapist, Assistive   [phone]      fax: [phone]      [email or web address], [address]  [place]   [address]

## 2019-02-26 NOTE — PROGRESS NOTES
ESTABLISHED PATIENT NEUROLOGY NOTE    DATE OF VISIT: 2/6/2019  CLINIC LOCATION: [facility]  MRN: [id number]  PATIENT NAME: Ghada Magana  YOB: 1935    PCP: MERON EASON PA-C    REASON FOR VISIT:   Chief Complaint   Patient presents with     Follow Up     memory      SUBJECTIVE:                                                      HISTORY OF PRESENT ILLNESS: Patient is here for follow up regarding Alzheimer's disease.  She was last seen on 01/16/2019.  At that time, we started her on low-dose of donepezil.  Please refer to my initial/other prior notes for further information.  The patient is accompanied by her , who participates in interview.    Since the last visit, the patient reports no significant changes in her symptoms.  She denies interval development of new focal neurological symptoms.  She tolerates donepezil 5 mg daily well without noticeable side effects.    On review of systems, patient endorses no additional active complaints. Medications, allergies, family and social history were also reviewed. There are no changes reported by patient.  REVIEW OF SYSTEMS:                                                    10-system review was completed. Pertinent positives are included in HPI. The remainder of ROS is negative.  EXAM:                                                    Physical Exam:   Vitals: /78 (BP Location: Left arm, Patient Position: Sitting, Cuff Size: Adult Regular)   Pulse 90   Temp 98.5  F (36.9  C) (Oral)   Resp 16   Wt 46.7 kg (103 lb)   SpO2 95%   BMI 18.84 kg/m      General: pt is in NAD, cooperative.  Skin: normal turgor, moist mucous membranes, no lesions/rashes noticed.  HEENT: ATNC, white sclera, normal conjunctiva.  Respiratory: Symmetric lung excursion, no accessory respiratory muscle use.  Abdomen: Non distended.  Neurological: awake, cooperative, follows commands, no aphasia or dysarthria noted, cranial nerves II-XII: no ptosis,  extraocular motility is full, face is symmetric, tongue is midline, equally moves all extremities, no dysmetria bilaterally, casual gait is normal.  ASSESSMENT and PLAN:                                                    Assessment: 83-year-old female patient with Alzheimer's disease presents for follow-up after initiation of donepezil, which at low dose she tolerates well without noticeable side effects.  I reviewed with her and her  that I would like to increase the dose to recommended to assure full effect.  The patient verbalized understanding.  She is willing to proceed.  At the next follow-up in approximately 6 months from now, I would like to repeat her cognitive screening test (MoCA).    Diagnoses:    ICD-10-CM    1. Late onset Alzheimer's disease without behavioral disturbance G30.1 donepezil (ARICEPT) 10 MG tablet    F02.80      Plan: At today's visit we thoroughly discussed current symptoms, available treatment options, and the plan.  We will slightly increase her donepezil to 10 mg at bedtime.    Next follow-up appointment is in the next 6 months or earlier if needed.    Total Time: 25 minutes with > 50% spent counseling the patient and her  on stated above assessment and recommendations.    Henrry De Anda MD  [facility]/ Neurology

## 2019-02-26 NOTE — TELEPHONE ENCOUNTER
LOV: 11/15/2018    Does sig mean this med is for 6 weeks in duration or fill?      Thanks! Miranda Blake RN

## 2019-02-26 NOTE — PROGRESS NOTES
02/26/19 1300   Quick Adds   Quick Adds Certification   Type of Visit Outpatient Occupational Therapy Re-Evaluation   General Information   Start Of Care Date 02/26/19   Referring Physician Henrry Pichardo   Orders Evaluate and treat as indicated   Orders Date 01/16/19   Medical Diagnosis Memory loss   Onset of Illness/Injury or Date of Surgery 01/16/19  (order)   Surgical/Medical History Reviewed Yes   Additional Occupational Profile Info/Pertinent History of Current Problem History of congenital lung anomalty, OA, HTN and recent memory loss.  Here today for f/u cognitive testing to determine changes and need for assistance   Comments/Observations Here today with spouse and daugher whom waited in waiting room.   Role/Living Environment   Current Community Support Family/friend caregiver   Patient role/Employment history Disabled   Community/Avocational Activities [employer]   Current Living Environment House  (with spouse)   Role/Living Environment Comments Lives with spouse and    Patient/family Goals Statement Patient thinks she is here today to talk about glasses.   Cognitive Status Examination   Cognitive Comment 4.6/5.6 on CPT 8/14/17   Visual Perception   Visual Perception Wears glasses   Posture   Posture Kyphosis   Bathing   Level of [place] - Bathing independent   Upper Body Dressing   Level of [place]: Dress Upper Body independent   Lower Body Dressing   Level of [place]: Dress Lower Body independent   Toileting   Level of [place]: Toilet independent   Grooming   Level of [place]: Grooming independent   Eating/Self-Feeding   Level of [place]: Eating independent   Instrumental Activities of Daily Living Assessment   Meal Planning/Preparation spouse completes and always has   Home/Financial Management spouse completes and always has   Communication/Computer Use n/a   Community Mobility Does not drive   Planned Therapy Interventions   Planned Therapy Interventions ADL  training;Cognitive performance testing;Cognitive skills   Intervention Comments CPT   OT Goal 1   Goal Identifier CPT   Goal Description Patient will participate in CPT in order to determine safety and home reccomendations.   Target Date 02/26/19   Clinical Impression   Criteria for Skilled Therapeutic Interventions Met Yes, treatment indicated   OT Diagnosis Memory loss limits safety with adls and iadls   Influenced by the following impairments memory loss   Assessment of Occupational Performance 1-3 Performance Deficits   Identified Performance Deficits Unable to participate in IADLS such as driving, cooking, cleaning, bills and unaware of deficits   Therapy Frequency once   Predicted Duration of Therapy Intervention (days/wks) today   Risks and Benefits of Treatment have been explained. Yes   Patient, Family & other staff in agreement with plan of care Yes   Clinical Impression Comments Skilled ot services needed to complete CPT and make rec for safety based on cognition.    Education Assessment   Barriers To Learning Cognitive   Preferred Learning Style Listening;Demonstration   Therapy Certification   Certification date from 02/26/19   Certification date to 02/26/19   Total Evaluation Time   OT Ambreenal, Low Complexity Minutes (73701) 15

## 2019-02-27 ENCOUNTER — OFFICE VISIT (OUTPATIENT)
Dept: NEUROLOGY | Facility: CLINIC | Age: 84
End: 2019-02-27
Payer: MEDICARE

## 2019-02-27 VITALS
BODY MASS INDEX: 18.84 KG/M2 | TEMPERATURE: 98.5 F | SYSTOLIC BLOOD PRESSURE: 128 MMHG | OXYGEN SATURATION: 95 % | RESPIRATION RATE: 16 BRPM | DIASTOLIC BLOOD PRESSURE: 78 MMHG | WEIGHT: 103 LBS | HEART RATE: 90 BPM

## 2019-02-27 DIAGNOSIS — F02.80 LATE ONSET ALZHEIMER'S DISEASE WITHOUT BEHAVIORAL DISTURBANCE (H): ICD-10-CM

## 2019-02-27 DIAGNOSIS — G30.1 LATE ONSET ALZHEIMER'S DISEASE WITHOUT BEHAVIORAL DISTURBANCE (H): ICD-10-CM

## 2019-02-27 PROCEDURE — 99214 OFFICE O/P EST MOD 30 MIN: CPT | Performed by: PSYCHIATRY & NEUROLOGY

## 2019-02-27 RX ORDER — DONEPEZIL HYDROCHLORIDE 10 MG/1
10 TABLET, FILM COATED ORAL AT BEDTIME
Qty: 90 TABLET | Refills: 1 | Status: SHIPPED | OUTPATIENT
Start: 2019-02-27 | End: 2019-08-21

## 2019-02-27 RX ORDER — DONEPEZIL HYDROCHLORIDE 5 MG/1
5 TABLET, FILM COATED ORAL AT BEDTIME
Qty: 30 TABLET | Refills: 0 | Status: SHIPPED | OUTPATIENT
Start: 2019-02-27 | End: 2019-02-27

## 2019-02-27 NOTE — LETTER
2/27/2019         RE: Ghada Magana  [address] [facility] [address]        Dear Colleague,    [facility] Please see a copy of my visit note below.    ESTABLISHED PATIENT NEUROLOGY NOTE    DATE OF VISIT: 2/6/2019  [facility]  MRN: [id number]  PATIENT NAME: Ghada Magana  YOB: 1935    PCP: MERON EASNO PA-C    REASON FOR VISIT:   Chief Complaint   Patient presents with     Follow Up     memory      SUBJECTIVE:                                                      HISTORY OF PRESENT ILLNESS: Patient is here for follow up regarding Alzheimer's disease.  She was last seen on 01/16/2019.  At that time, we started her on low-dose of donepezil.  Please refer to my initial/other prior notes for further information.  The patient is accompanied by her , who participates in interview.    Since the last visit, the patient reports no significant changes in her symptoms.  She denies interval development of new focal neurological symptoms.  She tolerates donepezil 5 mg daily well without noticeable side effects.    On review of systems, patient endorses no additional active complaints. Medications, allergies, family and social history were also reviewed. There are no changes reported by patient.  REVIEW OF SYSTEMS:                                                    10-system review was completed. Pertinent positives are included in HPI. The remainder of ROS is negative.  EXAM:                                                    Physical Exam:   Vitals: /78 (BP Location: Left arm, Patient Position: Sitting, Cuff Size: Adult Regular)   Pulse 90   Temp 98.5  F (36.9  C) (Oral)   Resp 16   Wt 46.7 kg (103 lb)   SpO2 95%   BMI 18.84 kg/m       General: pt is in NAD, cooperative.  Skin: normal turgor, moist mucous membranes, no lesions/rashes noticed.  HEENT:  ATNC, white sclera, normal conjunctiva.  Respiratory: Symmetric lung excursion, no accessory respiratory muscle use.  Abdomen: Non distended.  Neurological: awake, cooperative, follows commands, no aphasia or dysarthria noted, cranial nerves II-XII: no ptosis, extraocular motility is full, face is symmetric, tongue is midline, equally moves all extremities, no dysmetria bilaterally, casual gait is normal.  ASSESSMENT and PLAN:                                                    Assessment: 83-year-old female patient with Alzheimer's disease presents for follow-up after initiation of donepezil, which at low dose she tolerates well without noticeable side effects.  I reviewed with her and her  that I would like to increase the dose to recommended to assure full effect.  The patient verbalized understanding.  She is willing to proceed.  At the next follow-up in approximately 6 months from now, I would like to repeat her cognitive screening test (MoCA).    Diagnoses:    ICD-10-CM    1. Late onset Alzheimer's disease without behavioral disturbance G30.1 donepezil (ARICEPT) 10 MG tablet    F02.80      Plan: At today's visit we thoroughly discussed current symptoms, available treatment options, and the plan.  We will slightly increase her donepezil to 10 mg at bedtime.    Next follow-up appointment is in the next 6 months or earlier if needed.    Total Time: 25 minutes with > 50% spent counseling the patient and her  on stated above assessment and recommendations.    Henrry De Anda MD  / Neurology         Again, thank you for allowing me to participate in the care of your patient.        Sincerely,        Henrry De Anda MD

## 2019-02-27 NOTE — PATIENT INSTRUCTIONS
AFTER VISIT SUMMARY (AVS):    At today's visit we thoroughly discussed current symptoms, available treatment options, and the plan.  We will slightly increase your donepezil to 10 mg at bedtime.  We will recheck your cognitive testing at the next follow-up visit.    Next follow-up appointment is in the next 6 months or earlier if needed.    Please do not hesitate to call me with any questions or concerns.    Thanks.

## 2019-02-27 NOTE — TELEPHONE ENCOUNTER
Patient seen if office today and medication adjusted per Dr. De Anda to 10 mg daily at bedtime. Called [facility] and cancel 5 mg script.    Rashaun Gale MA

## 2019-03-04 DIAGNOSIS — F32.0 MILD MAJOR DEPRESSION (H): ICD-10-CM

## 2019-03-04 NOTE — TELEPHONE ENCOUNTER
"Requested Prescriptions   Pending Prescriptions Disp Refills     sertraline (ZOLOFT) 100 MG tablet [Pharmacy Med Name: Sertraline HCl Oral Tablet 100 MG]  Last Written Prescription Date:  2/25/2019  Last Fill Quantity: 90 tabs,  # refills: 0   Last office visit: 11/15/2018 with prescribing provider:  Cain   Future Office Visit:     90 tablet 0     Sig: Take 1 tablet by mouth once daily.    SSRIs Protocol Failed - 3/4/2019 12:07 PM       Failed - PHQ-9 score less than 5 in past 6 months    Please review last PHQ-9 score.          Passed - Medication is active on med list       Passed - Patient is age 18 or older       Passed - No active pregnancy on record       Passed - No positive pregnancy test in last 12 months       Passed - Recent (6 mo) or future (30 days) visit within the authorizing provider's specialty    Patient had office visit in the last 6 months or has a visit in the next 30 days with authorizing provider or within the authorizing provider's specialty.  See \"Patient Info\" tab in inbasket, or \"Choose Columns\" in Meds & Orders section of the refill encounter.              "

## 2019-03-05 RX ORDER — SERTRALINE HYDROCHLORIDE 100 MG/1
TABLET, FILM COATED ORAL
Qty: 90 TABLET | Refills: 0 | OUTPATIENT
Start: 2019-03-05

## 2019-03-05 NOTE — TELEPHONE ENCOUNTER
This is a duplicate.  Script sent to same pharmacy on 2/25/19 for 90 days of med with receipt confirmed.  Refused with note to pharmacy.    Maria Elena Uriostegui RN

## 2019-03-07 ENCOUNTER — TRANSFERRED RECORDS (OUTPATIENT)
Dept: HEALTH INFORMATION MANAGEMENT | Facility: CLINIC | Age: 84
End: 2019-03-07

## 2019-05-02 ENCOUNTER — OFFICE VISIT (OUTPATIENT)
Dept: FAMILY MEDICINE | Facility: CLINIC | Age: 84
End: 2019-05-02
Payer: MEDICARE

## 2019-05-02 VITALS
TEMPERATURE: 97.4 F | WEIGHT: 104.2 LBS | DIASTOLIC BLOOD PRESSURE: 60 MMHG | HEIGHT: 63 IN | SYSTOLIC BLOOD PRESSURE: 118 MMHG | BODY MASS INDEX: 18.46 KG/M2 | HEART RATE: 72 BPM

## 2019-05-02 DIAGNOSIS — H93.12 TINNITUS, LEFT EAR: Primary | ICD-10-CM

## 2019-05-02 DIAGNOSIS — J34.89 DRAINAGE FROM NOSE: ICD-10-CM

## 2019-05-02 DIAGNOSIS — H61.22 IMPACTED CERUMEN OF LEFT EAR: ICD-10-CM

## 2019-05-02 PROCEDURE — 99213 OFFICE O/P EST LOW 20 MIN: CPT | Performed by: PHYSICIAN ASSISTANT

## 2019-05-02 RX ORDER — IPRATROPIUM BROMIDE 42 UG/1
2 SPRAY, METERED NASAL 4 TIMES DAILY
Qty: 15 ML | Refills: 11 | Status: SHIPPED | OUTPATIENT
Start: 2019-05-02 | End: 2019-11-12 | Stop reason: DRUGHIGH

## 2019-05-02 ASSESSMENT — ANXIETY QUESTIONNAIRES
7. FEELING AFRAID AS IF SOMETHING AWFUL MIGHT HAPPEN: SEVERAL DAYS
5. BEING SO RESTLESS THAT IT IS HARD TO SIT STILL: MORE THAN HALF THE DAYS
6. BECOMING EASILY ANNOYED OR IRRITABLE: SEVERAL DAYS

## 2019-05-02 ASSESSMENT — MIFFLIN-ST. JEOR: SCORE: 887.81

## 2019-05-02 ASSESSMENT — PATIENT HEALTH QUESTIONNAIRE - PHQ9: 5. POOR APPETITE OR OVEREATING: MORE THAN HALF THE DAYS

## 2019-05-02 NOTE — PROGRESS NOTES
"  SUBJECTIVE:   Ghada Magana is a 84 year old female who presents to clinic today for the following   health issues:    Concern - Ear ringing  Onset:  4 days ago    Description:    Patient is here for left ear ringing     Intensity: moderate    Progression of Symptoms:  intermittent    Accompanying Signs & Symptoms:  Runny nose, hearing difficulty     Previous history of similar problem:   none    Precipitating factors:   Worsened by: none    Alleviating factors:  Improved by: none    Therapies Tried and outcome: none    Nose running constantly.  This is not a new issue. Has had for years. Is bothersome. Nasal steroid doesn't work. Eating makes it worse.       Additional history: as documented    Reviewed  and updated as needed this visit by clinical staff         Reviewed and updated as needed this visit by Provider         Labs reviewed in EPIC    ROS:  Constitutional, HEENT, cardiovascular, pulmonary, gi and gu systems are negative, except as otherwise noted.    OBJECTIVE:     /60 (BP Location: Right arm, Patient Position: Sitting, Cuff Size: Adult Regular)   Pulse 72   Temp 97.4  F (36.3  C) (Oral)   Ht 1.594 m (5' 2.75\")   Wt 47.3 kg (104 lb 3.2 oz)   BMI 18.61 kg/m    Body mass index is 18.61 kg/m .  GENERAL: healthy, alert and no distress  HENT: The left canal has cerumen impacted within the canal. After flushed/removed by the MA, the canal and ear drum were normal on exam.     ASSESSMENT/PLAN:     (H93.12) Tinnitus, left ear  (primary encounter diagnosis)  Comment:   Plan: She has a reassuring, normal exam without concerning findings. This could be due to the cerumen seen on exam. Recommend that she watch for non resolution. There are no pulsatile features or other concerning features. Warning symptoms of worsening condition discussed and patient shows good understanding.     (J34.89) Drainage from nose  Comment:   Plan: ipratropium (ATROVENT) 0.06 % nasal spray        Chronic. May be simple " functional issue. Will treat with ipratropium for a trial.     (H61.22) Impacted cerumen of left ear  Comment:   Plan: Cleared.     Follow up as needed.  LYNN Wei, PACatrachitaC

## 2019-05-02 NOTE — NURSING NOTE
Patient identified using two patient identifiers.  Ear exam showing wax occlusion completed by provider.  Solution: warm water was placed in the left ear(s) via irrigation tool: elephant ear.

## 2019-05-14 DIAGNOSIS — F32.0 MILD MAJOR DEPRESSION (H): ICD-10-CM

## 2019-05-14 RX ORDER — MIRTAZAPINE 30 MG/1
30 TABLET, FILM COATED ORAL AT BEDTIME
Qty: 90 TABLET | Refills: 1 | Status: SHIPPED | OUTPATIENT
Start: 2019-05-14 | End: 2019-11-11

## 2019-06-11 DIAGNOSIS — I10 HYPERTENSION GOAL BP (BLOOD PRESSURE) < 140/90: ICD-10-CM

## 2019-06-11 DIAGNOSIS — F32.0 MILD MAJOR DEPRESSION (H): ICD-10-CM

## 2019-06-11 NOTE — TELEPHONE ENCOUNTER
"Requested Prescriptions   Pending Prescriptions Disp Refills     sertraline (ZOLOFT) 100 MG tablet [Pharmacy Med Name: Sertraline HCl Oral Tablet 100 MG] 90 tablet 0     Sig: TAKE ONE TABLET BY MOUTH ONE TIME DAILY       Last Written Prescription Date:  02/25/19  Last Fill Quantity: 90,  # refills: 0   Last office visit: 5/2/2019 with prescribing provider:     Future Office Visit:   Next 5 appointments (look out 90 days)    Aug 28, 2019  1:00 PM CDT  Return Visit with Henrry De Anda MD  [facility] [address] [place] [id number] [phone]             SSRIs Protocol Failed - 6/11/2019  3:53 PM        Failed - PHQ-9 score less than 5 in past 6 months     Please review last PHQ-9 score.           Passed - Medication is active on med list        Passed - Patient is age 18 or older        Passed - No active pregnancy on record        Passed - No positive pregnancy test in last 12 months        Passed - Recent (6 mo) or future (30 days) visit within the authorizing provider's specialty         Patient had office visit in the last 6 months or has a visit in the next 30 days with authorizing provider or within the authorizing provider's specialty.  See \"Patient Info\" tab in inbasket, or \"Choose Columns\" in Meds & Orders section of the refill encounter.            amLODIPine (NORVASC) 5 MG tablet [Pharmacy Med Name: amLODIPine Besylate Oral Tablet 5 MG] 90 tablet 0     Sig: TAKE ONE TABLET BY MOUTH ONE TIME DAILY       Last Written Prescription Date:  12/04/2018  Last Fill Quantity: 90,  # refills: 1   Last office visit: 5/2/2019 with prescribing provider:     Future Office Visit:   Next 5 appointments (look out 90 days)    Aug 28, 2019  1:00 PM CDT  Return Visit with Henrry De Anda MD  [facility] [address] [place] [id number] [phone]             Calcium " "Channel Blockers Protocol  Passed - 6/11/2019  3:53 PM        Passed - Blood pressure under 140/90 in past 12 months     BP Readings from Last 3 Encounters:   05/02/19 118/60   02/27/19 128/78   01/16/19 138/86                 Passed - Recent (12 mo) or future (30 days) visit within the authorizing provider's specialty     Patient had office visit in the last 12 months or has a visit in the next 30 days with authorizing provider or within the authorizing provider's specialty.  See \"Patient Info\" tab in inbasket, or \"Choose Columns\" in Meds & Orders section of the refill encounter.              Passed - Medication is active on med list        Passed - Patient is age 18 or older        Passed - No active pregnancy on record        Passed - Normal serum creatinine on file in past 12 months     Recent Labs   Lab Test 11/20/18  1334   CR 0.60             Passed - No positive pregnancy test in past 12 months          "

## 2019-06-13 RX ORDER — SERTRALINE HYDROCHLORIDE 100 MG/1
TABLET, FILM COATED ORAL
Qty: 90 TABLET | Refills: 0 | Status: SHIPPED | OUTPATIENT
Start: 2019-06-13 | End: 2019-09-15

## 2019-06-13 RX ORDER — AMLODIPINE BESYLATE 5 MG/1
TABLET ORAL
Qty: 90 TABLET | Refills: 0 | Status: SHIPPED | OUTPATIENT
Start: 2019-06-13 | End: 2019-09-15

## 2019-06-17 DIAGNOSIS — I10 HYPERTENSION GOAL BP (BLOOD PRESSURE) < 140/90: ICD-10-CM

## 2019-06-17 NOTE — TELEPHONE ENCOUNTER
"Requested Prescriptions   Pending Prescriptions Disp Refills     carvedilol (COREG) 25 MG tablet [Pharmacy Med Name: Carvedilol Oral Tablet 25 MG]  Last Written Prescription Date:  2/25/2019  Last Fill Quantity: 180 tablet,  # refills: 0   Last office visit: 5/2/2019 with prescribing provider:  CHAU Barlow   Future Office Visit:   Next 5 appointments (look out 90 days)    Aug 28, 2019  1:00 PM CDT  Return Visit with Henrry De Anda MD  [facility] [address] [place] [id number] [phone]          180 tablet 0     Sig: TAKE 1 TABLET BY MOUTH TWICE DAILY WITH MEALS       Beta-Blockers Protocol Passed - 6/17/2019  3:24 PM        Passed - Blood pressure under 140/90 in past 12 months     BP Readings from Last 3 Encounters:   05/02/19 118/60   02/27/19 128/78   01/16/19 138/86           Passed - Patient is age 6 or older        Passed - Recent (12 mo) or future (30 days) visit within the authorizing provider's specialty     Patient had office visit in the last 12 months or has a visit in the next 30 days with authorizing provider or within the authorizing provider's specialty.  See \"Patient Info\" tab in inbasket, or \"Choose Columns\" in Meds & Orders section of the refill encounter.              Passed - Medication is active on med list          "

## 2019-06-19 RX ORDER — CARVEDILOL 25 MG/1
TABLET ORAL
Qty: 180 TABLET | Refills: 0 | Status: SHIPPED | OUTPATIENT
Start: 2019-06-19 | End: 2019-09-15

## 2019-06-19 NOTE — TELEPHONE ENCOUNTER
Prescription approved per [facility] Refill Protocol.  Kristin Solis RN

## 2019-08-19 DIAGNOSIS — G30.1 LATE ONSET ALZHEIMER'S DISEASE WITHOUT BEHAVIORAL DISTURBANCE (H): ICD-10-CM

## 2019-08-19 DIAGNOSIS — F02.80 LATE ONSET ALZHEIMER'S DISEASE WITHOUT BEHAVIORAL DISTURBANCE (H): ICD-10-CM

## 2019-08-19 NOTE — TELEPHONE ENCOUNTER
"Requested Prescriptions   Pending Prescriptions Disp Refills     donepezil (ARICEPT) 10 MG tablet 90 tablet 1     Sig: Take 1 tablet (10 mg) by mouth At Bedtime  Last Written Prescription Date:  2/27/2019  Last Fill Quantity: 90 tablet,  # refills: 1   Last Office Visit: 5/2/2019   Future Office Visit:    Next 5 appointments (look out 90 days)    Aug 28, 2019  1:00 PM CDT  Return Visit with Henrry De Anda MD  [facility] ([facility]) [address] [id number] [phone]              Miscellaneous Dementia Agents Passed - 8/19/2019 11:28 AM        Passed - Recent (12 mo) or future (30 days) visit within the authorizing provider's specialty     Patient had office visit in the last 12 months or has a visit in the next 30 days with authorizing provider or within the authorizing provider's specialty.  See \"Patient Info\" tab in inbasket, or \"Choose Columns\" in Meds & Orders section of the refill encounter.            Passed - Medication is active on med list        Passed - Patient is 18 years of age or older          "

## 2019-08-21 RX ORDER — DONEPEZIL HYDROCHLORIDE 10 MG/1
10 TABLET, FILM COATED ORAL AT BEDTIME
Qty: 30 TABLET | Refills: 0 | Status: SHIPPED | OUTPATIENT
Start: 2019-08-21 | End: 2019-09-11

## 2019-08-21 NOTE — TELEPHONE ENCOUNTER
Team coordinators-Please contact patient to schedule neurology follow up.  Plan per 2/27/19 neurology visit was to follow up in 6 months.    30 day supply refilled according to refill protocol.    Thank you!  PJ AlexanderN, RN

## 2019-08-30 ENCOUNTER — TRANSFERRED RECORDS (OUTPATIENT)
Dept: HEALTH INFORMATION MANAGEMENT | Facility: CLINIC | Age: 84
End: 2019-08-30

## 2019-09-01 ENCOUNTER — TRANSFERRED RECORDS (OUTPATIENT)
Dept: HEALTH INFORMATION MANAGEMENT | Facility: CLINIC | Age: 84
End: 2019-09-01

## 2019-09-09 ENCOUNTER — TELEPHONE (OUTPATIENT)
Dept: FAMILY MEDICINE | Facility: CLINIC | Age: 84
End: 2019-09-09

## 2019-09-09 NOTE — TELEPHONE ENCOUNTER
This patient was discharged from [facility] on 09/06/2019.    Discharge Diagnosis: E coli UTI with sepsis    Follow-up instructions: Follow up with PCP, Jacinto Barlow in 1 to 5 days    A follow-up visit has not been scheduled.      Number of ED/ER visits in the last 12 months:  1     Please follow-up with patient.    Page Quiroz

## 2019-09-10 NOTE — TELEPHONE ENCOUNTER
First attempt.     Patient/family was instructed to return call to [facility] RN directly on the RN Call back line at [phone].     Patient does have a hospital follow up scheduled for 9/11/19    Hannah Gutierrez RN

## 2019-09-11 ENCOUNTER — OFFICE VISIT (OUTPATIENT)
Dept: FAMILY MEDICINE | Facility: CLINIC | Age: 84
End: 2019-09-11
Payer: MEDICARE

## 2019-09-11 ENCOUNTER — OFFICE VISIT (OUTPATIENT)
Dept: NEUROLOGY | Facility: CLINIC | Age: 84
End: 2019-09-11
Payer: MEDICARE

## 2019-09-11 VITALS
WEIGHT: 102 LBS | TEMPERATURE: 98.1 F | BODY MASS INDEX: 18.77 KG/M2 | HEART RATE: 99 BPM | SYSTOLIC BLOOD PRESSURE: 138 MMHG | HEIGHT: 62 IN | DIASTOLIC BLOOD PRESSURE: 82 MMHG

## 2019-09-11 VITALS
HEART RATE: 99 BPM | TEMPERATURE: 98.1 F | BODY MASS INDEX: 18.3 KG/M2 | SYSTOLIC BLOOD PRESSURE: 138 MMHG | WEIGHT: 102.5 LBS | DIASTOLIC BLOOD PRESSURE: 82 MMHG | OXYGEN SATURATION: 96 %

## 2019-09-11 DIAGNOSIS — G30.1 LATE ONSET ALZHEIMER'S DISEASE WITHOUT BEHAVIORAL DISTURBANCE (H): Primary | ICD-10-CM

## 2019-09-11 DIAGNOSIS — A04.72 C. DIFFICILE COLITIS: Primary | ICD-10-CM

## 2019-09-11 DIAGNOSIS — F02.80 LATE ONSET ALZHEIMER'S DISEASE WITHOUT BEHAVIORAL DISTURBANCE (H): Primary | ICD-10-CM

## 2019-09-11 PROCEDURE — 99215 OFFICE O/P EST HI 40 MIN: CPT | Performed by: PSYCHIATRY & NEUROLOGY

## 2019-09-11 PROCEDURE — 99495 TRANSJ CARE MGMT MOD F2F 14D: CPT | Performed by: FAMILY MEDICINE

## 2019-09-11 RX ORDER — DONEPEZIL HYDROCHLORIDE 10 MG/1
10 TABLET, FILM COATED ORAL AT BEDTIME
Qty: 90 TABLET | Refills: 1 | Status: SHIPPED | OUTPATIENT
Start: 2019-09-11 | End: 2020-03-11

## 2019-09-11 RX ORDER — MEMANTINE HYDROCHLORIDE 5 MG/1
TABLET ORAL
Qty: 360 TABLET | Refills: 1 | Status: SHIPPED | OUTPATIENT
Start: 2019-09-11 | End: 2019-11-23

## 2019-09-11 ASSESSMENT — MONTREAL COGNITIVE ASSESSMENT (MOCA)
VISUOSPATIAL/EXECUTIVE SUBSCORE: 3
WHAT LEVEL OF EDUCATION WAS ATTAINED: 1
12. MEMORY INDEX SCORE: 0
WHAT IS THE TOTAL SCORE (OUT OF 30): 9
11. FOR EACH PAIR OF WORDS, WHAT CATEGORY DO THEY BELONG TO (OUT OF 2): 0
10. [FLUENCY] NAME WORDS STARTING WITH DESIGNATED LETTER: 0
4. NAME EACH OF THE THREE ANIMALS SHOWN: 2
7. [VIGILENCE] TAP WHEN HEARING DESIGNATED LETTER: 0
6. READ LIST OF DIGITS [FORWARD/BACKWARD]: 2
8. SERIAL SUBTRACTION OF 7S: 0
13. ORIENTATION SUBSCORE: 1
9. REPEAT EACH SENTENCE: 0

## 2019-09-11 ASSESSMENT — MIFFLIN-ST. JEOR: SCORE: 865.92

## 2019-09-11 NOTE — PROGRESS NOTES
"ESTABLISHED PATIENT NEUROLOGY NOTE    DATE OF VISIT: 9/11/2019  CLINIC LOCATION: [facility]  MRN: [id number]  PATIENT NAME: Ghada Magana  YOB: 1935    PCP: MERON EASON PA-C    REASON FOR VISIT:   Chief Complaint   Patient presents with     Follow Up     memory- has been stable      SUBJECTIVE:                                                      HISTORY OF PRESENT ILLNESS: Patient is here for follow up regarding Alzheimer's disease.  The last visit was on 02/27/2019.  At that time we increased her dose of donepezil to 10 mg daily. Please refer to my initial/other prior notes for further information.  The patient is accompanied by her  and daughter, who participate in interview and serve as collateral sources of information.    Since the last visit, the patient reports that her \"memory is not good\".  She is on 10 mg of donepezil daily without noticeable side effects.  She denies interval development of new neurological symptoms.  Her latest CPT score is 4.0/5.6 on 02/26/2019.    According to her , her cognitive decline continues to worsen, though he noticed day-to-day fluctuations.  Daughter adds that at times the patient is able to maintain \"normal\" conversations.  They do not have any additional concerns.    On review of systems, patient endorses a recent hospitalization for sepsis at [facility], but denies any other active complaints. Medications, allergies, family and social history were also reviewed. There are no changes reported by patient.  REVIEW OF SYSTEMS:                                                    10-system review was completed. Pertinent positives are included in HPI. The remainder of ROS is negative.  EXAM:                                                    Physical Exam:   Vitals: /82 (BP Location: Left arm, Patient Position: Sitting, Cuff Size: Adult Regular)   Pulse 99   Temp 98.1  F (36.7  C) (Oral)   Ht 1.575 m (5' 2\")   Wt " 46.3 kg (102 lb)   BMI 18.66 kg/m    [place] Cognitive Assessment:    [place] Cognitive Assessment (MOCA)  Visuospatial/Executive : 3  Namin  Attention - Digits: 2  Attention - Letters: 0  Attention - Subtraction: 0  Language - Repeat: 0  Language - Fluency : 0  Abstraction: 0  Delayed Recall: 0  Orientation: 1  Education: 0  MOCA Score: 8     [place] Cognitive Assessment Score:  MOCA Score: 8/30.     General: pt is in NAD, cooperative.  Skin: normal turgor, moist mucous membranes, no lesions/rashes noticed.  HEENT: ATNC, white sclera, normal conjunctiva.  Respiratory: Symmetric lung excursion, no accessory respiratory muscle use.  Abdomen: Non distended.  Neurological: awake, cooperative, follows commands, no aphasia or dysarthria noted, MoCA as per above, cranial nerves II-XII: no ptosis, extraocular motility is full, face is symmetric, tongue is midline, equally moves all extremities, no dysmetria bilaterally, casual gait is normal.  ASSESSMENT AND PLAN:                                                    Assessment: 84-year-old female patient with Alzheimer's disease presents for follow-up.  She is on 10 mg of donepezil daily without noticeable side effects.    We had a detailed discussion with the patient and her family regarding current complaints, available treatment options, and the plan.  Her MoCA today is 8/30 compared to 13/30 on 10/10/2018.  Her CPT from 2019 is 4.0/5.6, consistent with moderate cognitive functional disability that requires 24-hour supervision.  I advised the family to start planning for more structured living arrangement with various levels of care, including memory unit.    Donepezil should be continued.  In addition, she might also benefit from Namenda.  I reviewed most common and serious side effects, which include, but not limited to dizziness, headache, confusion, diarrhea, constipation, and hypertension.  Patient's daughter or  will contact our clinic if the  patient develops any concerning side effects.    Diagnoses:    ICD-10-CM    1. Late onset Alzheimer's disease without behavioral disturbance G30.1 donepezil (ARICEPT) 10 MG tablet    F02.80 memantine (NAMENDA) 5 MG tablet     Plan: At today's visit we thoroughly discussed current symptoms, MoCA and CPT results (need for 24-hour supervision), available treatment options, and the plan.  I refilled donepezil prescription.  We also decided to try Namenda.    We will plan repeating MoCA in approximately 12 months.    Next follow-up appointment is in the next 6 months or earlier if needed.    Total Time: 41 minutes with > 50% spent counseling the patient and her family on stated above assessment and recommendations.    Henrry De Anda MD  / Neurology

## 2019-09-11 NOTE — PATIENT INSTRUCTIONS
If you're still having diarrhea on Friday, September 20th, please schedule a lab appointment at any [place] clinic to get another stool sample to check for c. Difficile colitis.    BRAT Diarrhea diet: bananas, rice, applesauce, toast.    You can try jello, rice pudding, pears, mashed potatoes (with butter and half and half to add calories)

## 2019-09-11 NOTE — PROGRESS NOTES
Subjective     Ghada Magana is a 84 year old female who presents to clinic today for the following health issues:    [facility]     Hospital Follow-up Visit: E. Coli with sepsis, metabolic encephalopathy, acute respiratory failure    Hospital/Nursing Home/IP Rehab Facility: [facility]  Date of Admission: 9/1/2019  Date of Discharge: 9/6/2019  Reason(s) for Admission:   Sepsis, due to unspecified organism (HC) (Primary Dx); Urinary tract infection without hematuria, site   unspecified; Weakness; Fall, initial encounter; Clostridium difficile colitis; E. coli UTI (urinary tract infection)       C. Diff: started on 14 day course of vancomycin 9/4/2019       Problems taking medications regularly:  None but she has been experiencing diarrhea and is wondering if the medication could be a cause to that        Medication changes since discharge: yes she is on cefdinir and vancomycin        Problems adhering to non-medication therapy:  None    Summary of hospitalization:  CareEverywhere information obtained and reviewed  Diagnostic Tests/Treatments reviewed.  Follow up needed: none  Other Healthcare Providers Involved in Patient s Care:         None  Update since discharge: improved.     Post Discharge Medication Reconciliation: done.  Plan of care communicated with patient, family and ( and daughter)     Coding guidelines for this visit:  Type of Medical   Decision Making Face-to-Face Visit       within 7 Days of discharge Face-to-Face Visit        within 14 days of discharge   Moderate Complexity [id number]   High Complexity [id number]              Patient Active Problem List   Diagnosis     Congenital anomaly of lung     Osteoporosis     Anemia     Hyperlipidemia LDL goal <130     Hypertension goal BP (blood pressure) < 140/90     Advanced directives, counseling/discussion     Chronic cough     MCI (mild cognitive impairment) with memory loss     Mycobacterium avium infection (H)     Mild dementia      Mild major depression (H)     Past Surgical History:   Procedure Laterality Date     SURGICAL HISTORY OF -       s/p bronchoscopy        Social History     Tobacco Use     Smoking status: Never Smoker     Smokeless tobacco: Never Used   Substance Use Topics     Alcohol use: Yes     Comment: occasionally     Family History   Problem Relation Age of Onset     Cancer Daughter         ovarian cancer     Cerebrovascular Disease Father      Hypertension Father          Allergies   Allergen Reactions     Ace Inhibitors      Cough     Hctz      Increased nightime urination     Recent Labs   Lab Test 11/20/18  1334 10/25/18  1038 06/08/17  1751  07/24/14  1459   LDL  --   --   --   --  118   ALT 30 25 21   < >  --    CR 0.60 0.77 0.74   < > 1.02   GFRESTIMATED >90 71 75   < > 52*   GFRESTBLACK >90 86 >90   GFR Calc     < > 63   POTASSIUM 5.2 4.5 4.7   < > 4.1   TSH 3.22 2.38 1.81   < >  --     < > = values in this interval not displayed.      BP Readings from Last 3 Encounters:   09/11/19 138/82   05/02/19 118/60   02/27/19 128/78    Wt Readings from Last 3 Encounters:   09/11/19 46.5 kg (102 lb 8 oz)   05/02/19 47.3 kg (104 lb 3.2 oz)   02/27/19 46.7 kg (103 lb)            Reviewed and updated as needed this visit by Provider         Review of Systems   ROS COMP: Constitutional, HEENT, cardiovascular, pulmonary, GI, , musculoskeletal, neuro, skin, endocrine and psych systems are negative, except as otherwise noted.      Objective    /82 (BP Location: Right arm, Patient Position: Sitting, Cuff Size: Adult Regular)   Pulse 99   Temp 98.1  F (36.7  C) (Oral)   Wt 46.5 kg (102 lb 8 oz)   SpO2 96%   BMI 18.30 kg/m    Body mass index is 18.3 kg/m .  Physical Exam   GENERAL: healthy, alert, no distress and elderly  RESP: lungs clear to auscultation - no rales, rhonchi or wheezes  CV: regular rate and rhythm, normal S1 S2, no S3 or S4, no murmur, click or rub, no peripheral edema and peripheral  pulses strong  ABDOMEN: soft, nontender, no hepatosplenomegaly, no masses and bowel sounds normal  MS: no gross musculoskeletal defects noted, no edema  SKIN: no suspicious lesions or rashes  NEURO: abnormal mental status - appears to be able to answer simple yes/no questions, but majority of history provided by  and daughter  PSYCH: affect normal/bright and appearance well groomed     Assessment & Plan   Hospital follow up for UTI sepsis with metabolic encephalopathy and acute respiratory distress with pneumonia, now improving but with persistent diarrhea.  Will complete course of antibiotics 9/20/2020, if still having diarrhea, recommend retesting and retreating.  The patient's family indicates understanding of these issues and agrees with the plan.     1. C. difficile colitis  Will fu as indicated.   - Clostridium difficile Toxin B PCR; Future       Return in about 4 weeks (around 10/9/2019) for chronic disease management with your PCP.    Bridget Mendiola MD  [facility]

## 2019-09-11 NOTE — PATIENT INSTRUCTIONS
AFTER VISIT SUMMARY (AVS):    At today's visit we thoroughly discussed current symptoms, cognitive testing and CPT results (need for 24-hour supervision), available treatment options, and the plan.  I refilled donepezil prescription.  We also decided to try Namenda.  Please contact my clinic with any intolerable side effects.    We will plan to repeat cognitive test in approximately 12 months.    Next follow-up appointment is in the next 6 months or earlier if needed.    Please do not hesitate to call me with any questions or concerns.    Thanks.

## 2019-09-11 NOTE — LETTER
"    9/11/2019         RE: Ghada Magana  [address] [facility] [address]        Dear Colleague,    Thank you for referring your patient, Ghada Magana, to the [facility]. Please see a copy of my visit note below.    ESTABLISHED PATIENT NEUROLOGY NOTE    DATE OF VISIT: 9/11/2019  CLINIC LOCATION: [facility]  MRN: [id number]  PATIENT NAME: Ghada Magana  YOB: 1935    PCP: MERON EASON PA-C    REASON FOR VISIT:   Chief Complaint   Patient presents with     Follow Up     memory- has been stable      SUBJECTIVE:                                                      HISTORY OF PRESENT ILLNESS: Patient is here for follow up regarding Alzheimer's disease.  The last visit was on 02/27/2019.  At that time we increased her dose of donepezil to 10 mg daily. Please refer to my initial/other prior notes for further information.  The patient is accompanied by her  and daughter, who participate in interview and serve as collateral sources of information.    Since the last visit, the patient reports that her \"memory is not good\".  She is on 10 mg of donepezil daily without noticeable side effects.  She denies interval development of new neurological symptoms.  Her latest CPT score is 4.0/5.6 on 02/26/2019.    According to her , her cognitive decline continues to worsen, though he noticed day-to-day fluctuations.  Daughter adds that at times the patient is able to maintain \"normal\" conversations.  They do not have any additional concerns.    On review of systems, patient endorses a recent hospitalization for sepsis at [facility], but denies any other active complaints. Medications, allergies, family and social history were also reviewed. There are no changes reported by patient.  REVIEW OF SYSTEMS:                                                    10-system review was completed. Pertinent positives are included in HPI. The remainder of " "ROS is negative.  EXAM:                                                    Physical Exam:   Vitals: /82 (BP Location: Left arm, Patient Position: Sitting, Cuff Size: Adult Regular)   Pulse 99   Temp 98.1  F (36.7  C) (Oral)   Ht 1.575 m (5' 2\")   Wt 46.3 kg (102 lb)   BMI 18.66 kg/m     Chatom Cognitive Assessment:    Chatom Cognitive Assessment (MOCA)  Visuospatial/Executive : 3  Namin  Attention - Digits: 2  Attention - Letters: 0  Attention - Subtraction: 0  Language - Repeat: 0  Language - Fluency : 0  Abstraction: 0  Delayed Recall: 0  Orientation: 1  Education: 0  MOCA Score: 8     Marco Cognitive Assessment Score:  MOCA Score: .     General: pt is in NAD, cooperative.  Skin: normal turgor, moist mucous membranes, no lesions/rashes noticed.  HEENT: ATNC, white sclera, normal conjunctiva.  Respiratory: Symmetric lung excursion, no accessory respiratory muscle use.  Abdomen: Non distended.  Neurological: awake, cooperative, follows commands, no aphasia or dysarthria noted, MoCA as per above, cranial nerves II-XII: no ptosis, extraocular motility is full, face is symmetric, tongue is midline, equally moves all extremities, no dysmetria bilaterally, casual gait is normal.  ASSESSMENT AND PLAN:                                                    Assessment: 84-year-old female patient with Alzheimer's disease presents for follow-up.  She is on 10 mg of donepezil daily without noticeable side effects.    We had a detailed discussion with the patient and her family regarding current complaints, available treatment options, and the plan.  Her MoCA today is 8/30 compared to 13/30 on 10/10/2018.  Her CPT from 2019 is 4.0/5.6, consistent with moderate cognitive functional disability that requires 24-hour supervision.  I advised the family to start planning for more structured living arrangement with various levels of care, including memory unit.    Donepezil should be continued.  In " addition, she might also benefit from Namenda.  I reviewed most common and serious side effects, which include, but not limited to dizziness, headache, confusion, diarrhea, constipation, and hypertension.  Patient's daughter or  will contact our clinic if the patient develops any concerning side effects.    Diagnoses:    ICD-10-CM    1. Late onset Alzheimer's disease without behavioral disturbance G30.1 donepezil (ARICEPT) 10 MG tablet    F02.80 memantine (NAMENDA) 5 MG tablet     Plan: At today's visit we thoroughly discussed current symptoms, MoCA and CPT results (need for 24-hour supervision), available treatment options, and the plan.  I refilled donepezil prescription.  We also decided to try Namenda.    We will plan repeating MoCA in approximately 12 months.    Next follow-up appointment is in the next 6 months or earlier if needed.    Total Time: 41 minutes with > 50% spent counseling the patient and her family on stated above assessment and recommendations.    Henrry De Anda MD  / Neurology      Again, thank you for allowing me to participate in the care of your patient.        Sincerely,        Henrry De Anda MD

## 2019-09-12 NOTE — TELEPHONE ENCOUNTER
Reached out to patient who states that she saw a provider at another clinic on 9/11/19 for a hospital follow up. Patient declined this follow up and stated that there were no questions or concerns.     Hannah Gutierrez RN

## 2019-09-15 DIAGNOSIS — F32.0 MILD MAJOR DEPRESSION (H): ICD-10-CM

## 2019-09-15 DIAGNOSIS — I10 HYPERTENSION GOAL BP (BLOOD PRESSURE) < 140/90: ICD-10-CM

## 2019-09-16 NOTE — TELEPHONE ENCOUNTER
"Requested Prescriptions   Pending Prescriptions Disp Refills     carvedilol (COREG) 25 MG tablet [Pharmacy Med Name: Carvedilol Oral Tablet 25 MG]  Last Written Prescription Date:  6/19/2019  Last Fill Quantity: 180 tablet,  # refills: 0   Last office visit: 5/2/2019 with prescribing provider:  CHAU Barlow   Future Office Visit:     180 tablet 0     Sig: TAKE 1 TABLET BY MOUTH TWICE DAILY WITH MEALS       Beta-Blockers Protocol Passed - 9/15/2019  5:01 PM        Passed - Blood pressure under 140/90 in past 12 months     BP Readings from Last 3 Encounters:   09/11/19 138/82   09/11/19 138/82   05/02/19 118/60             Passed - Patient is age 6 or older        Passed - Recent (12 mo) or future (30 days) visit within the authorizing provider's specialty     Patient had office visit in the last 12 months or has a visit in the next 30 days with authorizing provider or within the authorizing provider's specialty.  See \"Patient Info\" tab in inbasket, or \"Choose Columns\" in Meds & Orders section of the refill encounter.              Passed - Medication is active on med list                amLODIPine (NORVASC) 5 MG tablet [Pharmacy Med Name: amLODIPine Besylate Oral Tablet 5 MG]  Last Written Prescription Date:  6/13/2019  Last Fill Quantity: 90 tablet,  # refills: 0   Last office visit: 5/2/2019 with prescribing provider:  CHAU Barlow   Future Office Visit:     90 tablet 0     Sig: TAKE 1 TABLET BY MOUTH ONCE DAILY       Calcium Channel Blockers Protocol  Passed - 9/15/2019  5:01 PM        Passed - Blood pressure under 140/90 in past 12 months     BP Readings from Last 3 Encounters:   09/11/19 138/82   09/11/19 138/82   05/02/19 118/60             Passed - Recent (12 mo) or future (30 days) visit within the authorizing provider's specialty     Patient had office visit in the last 12 months or has a visit in the next 30 days with authorizing provider or within the authorizing provider's specialty.  See \"Patient Info\" tab in " "inbasket, or \"Choose Columns\" in Meds & Orders section of the refill encounter.              Passed - Medication is active on med list        Passed - Patient is age 18 or older        Passed - No active pregnancy on record        Passed - Normal serum creatinine on file in past 12 months     Recent Labs   Lab Test 11/20/18  1334   CR 0.60             Passed - No positive pregnancy test in past 12 months                sertraline (ZOLOFT) 100 MG tablet [Pharmacy Med Name: Sertraline HCl Oral Tablet 100 MG]  Last Written Prescription Date:  6/13/2019  Last Fill Quantity: 90 tablet,  # refills: 0   Last office visit: 5/2/2019 with prescribing provider:  CHAU Barlow   Future Office Visit:     90 tablet 0     Sig: TAKE 1 TABLET BY MOUTH ONCE DAILY       SSRIs Protocol Failed - 9/15/2019  5:01 PM        Failed - PHQ-9 score less than 5 in past 6 months     Please review last PHQ-9 score.   No flowsheet data found.  No flowsheet data found.            Passed - Medication is active on med list        Passed - Patient is age 18 or older        Passed - No active pregnancy on record        Passed - No positive pregnancy test in last 12 months        Passed - Recent (6 mo) or future (30 days) visit within the authorizing provider's specialty     Patient had office visit in the last 6 months or has a visit in the next 30 days with authorizing provider or within the authorizing provider's specialty.  See \"Patient Info\" tab in inbasket, or \"Choose Columns\" in Meds & Orders section of the refill encounter.              "

## 2019-09-19 RX ORDER — SERTRALINE HYDROCHLORIDE 100 MG/1
TABLET, FILM COATED ORAL
Qty: 90 TABLET | Refills: 0 | Status: SHIPPED | OUTPATIENT
Start: 2019-09-19 | End: 2020-01-15

## 2019-09-19 RX ORDER — AMLODIPINE BESYLATE 5 MG/1
TABLET ORAL
Qty: 90 TABLET | Refills: 0 | Status: SHIPPED | OUTPATIENT
Start: 2019-09-19 | End: 2019-11-12

## 2019-09-19 RX ORDER — CARVEDILOL 25 MG/1
TABLET ORAL
Qty: 180 TABLET | Refills: 0 | Status: SHIPPED | OUTPATIENT
Start: 2019-09-19 | End: 2020-01-15

## 2019-09-19 NOTE — TELEPHONE ENCOUNTER
Prescription approved per [facility] Refill Protocol.  Mar Fairchild,Clinic Rn  [place]

## 2019-09-23 DIAGNOSIS — A04.72 C. DIFFICILE COLITIS: ICD-10-CM

## 2019-09-23 LAB
C DIFF TOX B STL QL: NEGATIVE
SPECIMEN SOURCE: NORMAL

## 2019-09-23 PROCEDURE — 87493 C DIFF AMPLIFIED PROBE: CPT | Performed by: FAMILY MEDICINE

## 2019-09-24 NOTE — RESULT ENCOUNTER NOTE
Great news!  The recheck of your stool studies were NEGATIVE for the C diff toxin/bacteria.  This means you do not need to be on another course of antibiotics. Try to resume a regular diet with plenty of fiber and consider eating yoghurt once or twice per day to help your gut heal.    If you have any questions, please contact the clinic or schedule an appointment with me, thank you!    Sincerely,  Dr. Bridget Mendiola MD  9/24/2019

## 2019-10-08 ENCOUNTER — NURSE TRIAGE (OUTPATIENT)
Dept: FAMILY MEDICINE | Facility: CLINIC | Age: 84
End: 2019-10-08

## 2019-10-08 NOTE — TELEPHONE ENCOUNTER
Reason for call:  Patient reporting a symptom    Symptom or request: Diarrhea 2-3x a week sometimes daily    Duration (how long have symptoms been present): 9/11/19    Have you been treated for this before? Yes    Additional comments:     Phone Number patient can be reached at:  Home number on file [phone] (home)    Best Time:      Can we leave a detailed message on this number:  YES    Call taken on 10/8/2019 at 4:28 PM by Miranda Brown

## 2019-10-08 NOTE — TELEPHONE ENCOUNTER
Writer spoke with patient's spouse, Rhett, who stated patient's memory is poor and will not be able to give very clear answers.    Additional Information    Negative: Shock suspected (e.g., cold/pale/clammy skin, too weak to stand, low BP, rapid pulse)    Negative: Difficult to awaken or acting confused (e.g., disoriented, slurred speech)    Negative: Sounds like a life-threatening emergency to the triager    Negative: Vomiting also present and worse than the diarrhea    Negative: Blood in stool and without diarrhea    Negative: SEVERE abdominal pain (e.g., excruciating) and present > 1 hour    Negative: SEVERE abdominal pain and age > 60    Negative: Bloody, black, or tarry bowel movements    Negative: SEVERE diarrhea (e.g., 7 or more times / day more than normal) and age > 60 years    Negative: Constant abdominal pain lasting > 2 hours    Negative: Drinking very little and has signs of dehydration (e.g., no urine > 12 hours, very dry mouth, very lightheaded)    Negative: Patient sounds very sick or weak to the triager    Negative: SEVERE diarrhea (e.g., 7 or more times / day more than normal) and present > 24 hours (1 day)    Negative: MODERATE diarrhea (e.g., 4-6 times / day more than normal) and present > 48 hours (2 days)    Negative: MODERATE diarrhea (e.g., 4-6 times / day more than normal) and age > 70 years    Negative: Abdominal pain  (Exception: pain clears completely with each passage of diarrhea stool)    Negative: Fever > 101 F (38.3 C)    Negative: Blood in the stool    Negative: Mucus or pus in stool has been present > 2 days and diarrhea is more than mild    Negative: Weak immune system (e.g., HIV positive, cancer chemo, splenectomy, organ transplant, chronic steroids)    Negative: Travel to a foreign country in past month    Recent antibiotic therapy (i.e., within last 2 months) and diarrhea present > 3 days since antibiotic was stopped    Answer Assessment - Initial Assessment Questions  1.  "DIARRHEA SEVERITY: \"How bad is the diarrhea?\" \"How many extra stools have you had in the past 24 hours than normal?\"     - MILD: Few loose or mushy BMs; increase of 1-3 stools over normal daily number of stools; mild increase in ostomy output.    - MODERATE: Increase of 4-6 stools daily over normal; moderate increase in ostomy output.    - SEVERE (or Worst Possible): Increase of 7 or more stools daily over normal; moderate increase in ostomy output; incontinence.      Mild  2. ONSET: \"When did the diarrhea begin?\"       Last month while in hospital  3. BM CONSISTENCY: \"How loose or watery is the diarrhea?\"       More formed, but loose.  4. VOMITING: \"Are you also vomiting?\" If so, ask: \"How many times in the past 24 hours?\"       No  5. ABDOMINAL PAIN: \"Are you having any abdominal pain?\" If yes: \"What does it feel like?\" (e.g., crampy, dull, intermittent, constant)       No  6. ABDOMINAL PAIN SEVERITY: If present, ask: \"How bad is the pain?\"  (e.g., Scale 1-10; mild, moderate, or severe)     - MILD (1-3): doesn't interfere with normal activities, abdomen soft and not tender to touch      - MODERATE (4-7): interferes with normal activities or awakens from sleep, tender to touch      - SEVERE (8-10): excruciating pain, doubled over, unable to do any normal activities        N/A  7. ORAL INTAKE: If vomiting, \"Have you been able to drink liquids?\" \"How much fluids have you had in the past 24 hours?\"      PO intake is \"very good\" but patient needs encouragement to drink fluids  8. HYDRATION: \"Any signs of dehydration?\" (e.g., dry mouth [not just dry lips], too weak to stand, dizziness, new weight loss) \"When did you last urinate?\"      No  9. EXPOSURE: \"Have you traveled to a foreign country recently?\" \"Have you been exposed to anyone with diarrhea?\" \"Could you have eaten any food that was spoiled?\"      No  10. ANTIBIOTIC USE: \"Are you taking antibiotics now or have you taken antibiotics in the past 2 months?\"        " "Yes-during hospitalization got IV antibiotics and then on a two week oral course of antibiotics  11. OTHER SYMPTOMS: \"Do you have any other symptoms?\" (e.g., fever, blood in stool)        None  12. PREGNANCY: \"Is there any chance you are pregnant?\" \"When was your last menstrual period?\"        N/A    Protocols used: DIARRHEA-A-OH      "

## 2019-10-09 ENCOUNTER — OFFICE VISIT (OUTPATIENT)
Dept: FAMILY MEDICINE | Facility: CLINIC | Age: 84
End: 2019-10-09
Payer: MEDICARE

## 2019-10-09 VITALS
SYSTOLIC BLOOD PRESSURE: 100 MMHG | HEART RATE: 99 BPM | DIASTOLIC BLOOD PRESSURE: 60 MMHG | OXYGEN SATURATION: 95 % | TEMPERATURE: 98.6 F | HEIGHT: 62 IN | RESPIRATION RATE: 12 BRPM | BODY MASS INDEX: 18.95 KG/M2 | WEIGHT: 103 LBS

## 2019-10-09 DIAGNOSIS — R19.5 LOOSE STOOLS: Primary | ICD-10-CM

## 2019-10-09 DIAGNOSIS — Z86.19 HISTORY OF CLOSTRIDIOIDES DIFFICILE COLITIS: ICD-10-CM

## 2019-10-09 DIAGNOSIS — G30.1 LATE ONSET ALZHEIMER'S DISEASE WITHOUT BEHAVIORAL DISTURBANCE (H): ICD-10-CM

## 2019-10-09 DIAGNOSIS — Z23 NEED FOR PROPHYLACTIC VACCINATION AND INOCULATION AGAINST INFLUENZA: ICD-10-CM

## 2019-10-09 DIAGNOSIS — F02.80 LATE ONSET ALZHEIMER'S DISEASE WITHOUT BEHAVIORAL DISTURBANCE (H): ICD-10-CM

## 2019-10-09 PROCEDURE — 90662 IIV NO PRSV INCREASED AG IM: CPT | Performed by: FAMILY MEDICINE

## 2019-10-09 PROCEDURE — G0008 ADMIN INFLUENZA VIRUS VAC: HCPCS | Performed by: FAMILY MEDICINE

## 2019-10-09 PROCEDURE — 99214 OFFICE O/P EST MOD 30 MIN: CPT | Mod: 25 | Performed by: FAMILY MEDICINE

## 2019-10-09 ASSESSMENT — MIFFLIN-ST. JEOR: SCORE: 870.45

## 2019-10-09 NOTE — PROGRESS NOTES
"Subjective     Ghada Magana is a 84 year old female who presents to clinic today for the following health issues:    HPI   Diarrhea  Onset: x 3-4 days     Description:   Consistency of stool: loose  Blood in stool: NO  Number of loose stools in past 24 hours: 2    Progression of Symptoms:  same    Accompanying Signs & Symptoms:  Fever: no   Nausea or vomiting; no   Abdominal pain: no   Episodes of constipation: no   Weight loss: no     History:   Ill contacts: no   Recent use of antibiotics: YES   Recent travels: no          Recent medication-new or changes(Rx or OTC): no     Precipitating factors: Antibiotics        Alleviating factors: None          Loose stools  History of Clostridioides difficile colitis : Hospitalized in September for sepsis and UTI in the setting of fairly advanced dementia.  Here with her  today they live independently in their house.  During the hospitalization she developed diarrhea and diagnosed with C. difficile colitis and was treated with oral vancomycin.     states that her symptoms did dramatically improve she was having profuse watery diarrhea 6-7 times per day.  That has resolved.  What has remained however are about  1-3 loose stools per day that he describes as semi-formed.    They are not watery and there is no blood.    They come out in a \"pile\" that sticks to the side of the bowl.    Otherwise she denies feeling ill or sick and he also agrees that she does not seem sick in any other way.  Certainly no fevers not having abdominal pain.    Interestingly she has been on Aricept for quite some time but did start Namenda in mid September which has been titrated up since that time.        Problem list, Medication list, Allergies, and Medical/Social/Surgical histories reviewed in [facility] and updated as appropriate.  Labs reviewed in EPIC  BP Readings from Last 3 Encounters:   10/09/19 100/60   09/11/19 138/82   09/11/19 138/82    Wt Readings from Last 3 Encounters: "   10/09/19 46.7 kg (103 lb)   09/11/19 46.3 kg (102 lb)   09/11/19 46.5 kg (102 lb 8 oz)                  Patient Active Problem List   Diagnosis     Congenital anomaly of lung     Osteoporosis     Anemia     Hyperlipidemia LDL goal <130     Hypertension goal BP (blood pressure) < 140/90     Advanced directives, counseling/discussion     Chronic cough     MCI (mild cognitive impairment) with memory loss     Mycobacterium avium infection (H)     Mild dementia (H)     Mild major depression (H)     Past Surgical History:   Procedure Laterality Date     SURGICAL HISTORY OF -       s/p bronchoscopy        Social History     Tobacco Use     Smoking status: Never Smoker     Smokeless tobacco: Never Used   Substance Use Topics     Alcohol use: Yes     Comment: occasionally     Family History   Problem Relation Age of Onset     Cancer Daughter         ovarian cancer     Cerebrovascular Disease Father      Hypertension Father          Current Outpatient Medications   Medication Sig Dispense Refill     amLODIPine (NORVASC) 5 MG tablet TAKE 1 TABLET BY MOUTH ONCE DAILY 90 tablet 0     ASPIRIN 81 MG OR TABS 1 tab po QD (Once per day) 100 3     CALCIUM 600 + D 600-200 MG-UNIT OR TABS 2 per day.  0 0     carvedilol (COREG) 25 MG tablet TAKE 1 TABLET BY MOUTH TWICE DAILY WITH MEALS 180 tablet 0     donepezil (ARICEPT) 10 MG tablet Take 1 tablet (10 mg) by mouth At Bedtime 90 tablet 1     ipratropium (ATROVENT) 0.06 % nasal spray Spray 2 sprays into both nostrils 4 times daily 15 mL 11     losartan (COZAAR) 100 MG tablet Take 1 tablet (100 mg) by mouth daily (Patient taking differently: Take 50 mg by mouth daily ) 90 tablet 1     memantine (NAMENDA) 5 MG tablet 1st Week: 5 mg in evening. 2nd Week: 5 mg two times/day. 3rd Week: 5 mg in morning, 10 mg in evening.  10 mg twice daily thereafter. 360 tablet 1     mirtazapine (REMERON) 30 MG tablet Take 1 tablet (30 mg) by mouth At Bedtime 90 tablet 1     MULTI-VITAMIN OR TABS take one  "daily  0     sertraline (ZOLOFT) 100 MG tablet TAKE 1 TABLET BY MOUTH ONCE DAILY 90 tablet 0     thiamine mononitrate 100 MG TABS Take 100 mg by mouth daily 30 tablet 11     TYLENOL 325 MG PO TABS 2 TABLETS EVERY 4 HOURS AS NEEDED       UNKNOWN MED DOSAGE timolol eye gtts one drop in each eye twice daily       Allergies   Allergen Reactions     Ace Inhibitors      Cough     Hctz      Increased nightime urination     Recent Labs   Lab Test 11/20/18  1334 10/25/18  1038 06/08/17  1751  07/24/14  1459   LDL  --   --   --   --  118   ALT 30 25 21   < >  --    CR 0.60 0.77 0.74   < > 1.02   GFRESTIMATED >90 71 75   < > 52*   GFRESTBLACK >90 86 >90   GFR Calc     < > 63   POTASSIUM 5.2 4.5 4.7   < > 4.1   TSH 3.22 2.38 1.81   < >  --     < > = values in this interval not displayed.        ROS:  Constitutional, HEENT, cardiovascular, pulmonary, GI, , musculoskeletal, neuro, skin, endocrine and psych systems are negative, except as otherwise noted.        OBJECTIVE:  /60 (BP Location: Right arm, Patient Position: Sitting, Cuff Size: Adult Regular)   Pulse 99   Temp 98.6  F (37  C) (Oral)   Resp 12   Ht 1.575 m (5' 2\")   Wt 46.7 kg (103 lb)   SpO2 95%   BMI 18.84 kg/m      EXAM:  GENERAL APPEARANCE: healthy, alert and no distress appears nontoxic -   Clear speech today does answer yes or no questions but looks to  to answer more complicated questions  RESP: lungs clear to auscultation - no rales, rhonchi or wheezes  CV: regular rates and rhythm, normal S1 S2, no S3 or S4 and no murmur, click or rub -  ABDOMEN:  soft, nontender, no HSM or masses and bowel sounds normal      ASSESSMENT AND PLAN  Patient Instructions   ASSESSMENT AND PLAN  1. Loose stools  Never quite back to normal after hospitalization for uti/sepsis/c-diff.      Stools now loose but not watery.     One follow up c-diff test negative already.     No blood     Nemantadine is NEW however and fits with loose stools - very " "common side effect.  I suspect this is why have not quite gotten back to normal.     For now willcontinue to monitor and expect somewhat more loose than normal.     Repeat second c-diff test now however though.     Follow up as needed.     2. History of Clostridioides difficile colitis    3. Dementia/as above.       MYCHART FOR ON-LINE CARE(VISITS), LABS, REFILLS, MESSAGING, ETC [email or web address] [phone]    E-VISIT: click \"on-line care, then request e-visit\".  E-visits work well for following up on issues we have discussed in clinic previously which may need new prescriptions, new prescriptions or substantial discussion. These are always done by me (Dr. Wegener).     ONCARE VISIT:  [email or web address]  - we treat nearly 50 common conditions through on-care.  These are done in an hour by on-call staff.     RADIOLOGY:  [facility]:  [phone]   [facility]: [phone]    Mammogram and Colonoscopy [phone]    Smoking Cessation: [email or web address], 8-129-689-PLAN ([phone])      CONSUMER PRICE LINE for estimates of test costs:  [phone]             Joel Wegener, MD        "

## 2019-10-09 NOTE — PATIENT INSTRUCTIONS
"ASSESSMENT AND PLAN  1. Loose stools  Never quite back to normal after hospitalization for uti/sepsis/c-diff.      Stools now loose but not watery.     One follow up c-diff test negative already.     No blood     Nemantadine is NEW however and fits with loose stools - very common side effect.  I suspect this is why have not quite gotten back to normal.     For now willcontinue to monitor and expect somewhat more loose than normal.     Repeat second c-diff test now however though.     Follow up as needed.     2. History of Clostridioides difficile colitis          MYCHART FOR ON-LINE CARE(VISITS), LABS, REFILLS, MESSAGING, ETC [email or web address] [phone]    E-VISIT: click \"on-line care, then request e-visit\".  E-visits work well for following up on issues we have discussed in clinic previously which may need new prescriptions, new prescriptions or substantial discussion. These are always done by me (Dr. Wegener).     ONCARE VISIT:  [email or web address]  - we treat nearly 50 common conditions through on-care.  These are done in an hour by on-call staff.     RADIOLOGY:  [facility]:  [phone]   [facility]: [phone]    Mammogram and Colonoscopy [phone]    Smoking Cessation: [email or web address], 8-332-175-PLAN ([phone])      CONSUMER PRICE LINE for estimates of test costs:  [phone]       "

## 2019-10-10 ENCOUNTER — TELEPHONE (OUTPATIENT)
Dept: FAMILY MEDICINE | Facility: CLINIC | Age: 84
End: 2019-10-10

## 2019-10-10 DIAGNOSIS — A04.71 RECURRENT CLOSTRIDIOIDES DIFFICILE DIARRHEA: Primary | ICD-10-CM

## 2019-10-10 DIAGNOSIS — R19.5 LOOSE STOOLS: ICD-10-CM

## 2019-10-10 DIAGNOSIS — Z86.19 HISTORY OF CLOSTRIDIOIDES DIFFICILE COLITIS: ICD-10-CM

## 2019-10-10 LAB
C DIFF TOX B STL QL: POSITIVE
SPECIMEN SOURCE: ABNORMAL

## 2019-10-10 PROCEDURE — 87493 C DIFF AMPLIFIED PROBE: CPT | Performed by: FAMILY MEDICINE

## 2019-10-11 RX ORDER — VANCOMYCIN HYDROCHLORIDE 125 MG/1
CAPSULE ORAL
Qty: 91 CAPSULE | Refills: 0 | Status: SHIPPED | OUTPATIENT
Start: 2019-10-11 | End: 2019-11-12

## 2019-10-11 NOTE — TELEPHONE ENCOUNTER
If vancomycin was used for initial episode: Pulsed-tapered regimen: 125 mg 4 times daily for 10 to 14 days, then 125 mg twice daily for 7 days, then 125 mg once daily for 7 days, then 125 mg every 2 or 3 days for 2 to 8 weeks (IDSA/SHEA [Kathleen 2018])    Call pt:    Treat with longer course of oral vancomycin:    Fax prescription in nurse basket, double check math on dispense amount.     Follow up with GI - referral placed.     If severe symptoms (I.e severe abdominal pain, dehydration, fevers) then go to EMERGENCY ROOM.     fyi - the oral vancomycin only works in the GI tract (not systemic! ) so should be quite well tolerated and safe.     Joel Wegener,MD

## 2019-10-11 NOTE — TELEPHONE ENCOUNTER
Dr. Wegener,     Please advise on plan for positive C-diff.    Any medication?  Follow up?    Writer will relay the following:    C. diff can easily spread to other people in your home or workplace. The germs can remain on your hands after using the bathroom, then spread to any person, surface, or object you touch. Here's how to not spread C. diff to other people:    Practice good handwashing. This is especially important after using the bathroom and before eating. Here's what to do: Wet your hands, scrub them with soap for 30 to 40 seconds, then rinse well and dry.    Wash your clothes, bed sheets, and towels in separate loads. Use hot water. Use both detergent and chlorine bleach.     Use chlorine bleach-based products to disinfect surfaces you touch often, such as table tops, light switches, door knobs, and toilet seats.    Remind others to wear gloves and to wash their hands if assisting you in the bathroom.  Don't use alcohol-based hand . They don't work against C. diff.         Thanks! Miranda Blake RN

## 2019-10-14 DIAGNOSIS — I10 HYPERTENSION GOAL BP (BLOOD PRESSURE) < 140/90: ICD-10-CM

## 2019-10-14 NOTE — TELEPHONE ENCOUNTER
"Requested Prescriptions   Pending Prescriptions Disp Refills     losartan (COZAAR) 100 MG tablet [Pharmacy Med Name: Losartan Potassium Oral Tablet 100 MG]  Last Written Prescription Date:  8/23/2018  Last Fill Quantity: 90 tablet,  # refills: 1   Last office visit: 5/2/2019 with prescribing provider:  CHAU Barlow   Future Office Visit:   Next 5 appointments (look out 90 days)    Oct 18, 2019 12:40 PM CDT  SHORT with Chip Barlow PA-C  [facility] ([address] [id number] [phone]        90 tablet 0     Sig: Take 1 tablet by mouth once daily.       Angiotensin-II Receptors Passed - 10/14/2019 10:21 AM        Passed - Last blood pressure under 140/90 in past 12 months     BP Readings from Last 3 Encounters:   10/09/19 100/60   09/11/19 138/82   09/11/19 138/82             Passed - Recent (12 mo) or future (30 days) visit within the authorizing provider's specialty     Patient has had an office visit with the authorizing provider or a provider within the authorizing providers department within the previous 12 mos or has a future within next 30 days. See \"Patient Info\" tab in inbasket, or \"Choose Columns\" in Meds & Orders section of the refill encounter.              Passed - Medication is active on med list        Passed - Patient is age 18 or older        Passed - No active pregnancy on record        Passed - Normal serum creatinine on file in past 12 months     Recent Labs   Lab Test 11/20/18  1334   CR 0.60             Passed - Normal serum potassium on file in past 12 months     Recent Labs   Lab Test 11/20/18  1334   POTASSIUM 5.2                    Passed - No positive pregnancy test in past 12 months        "

## 2019-10-15 RX ORDER — LOSARTAN POTASSIUM 100 MG/1
TABLET ORAL
Qty: 90 TABLET | Refills: 0 | Status: SHIPPED | OUTPATIENT
Start: 2019-10-15 | End: 2020-01-20

## 2019-10-15 NOTE — TELEPHONE ENCOUNTER
Prescription approved per [facility] Refill Protocol.  Antonio Velarde RN

## 2019-10-18 ENCOUNTER — OFFICE VISIT (OUTPATIENT)
Dept: FAMILY MEDICINE | Facility: CLINIC | Age: 84
End: 2019-10-18
Payer: MEDICARE

## 2019-10-18 VITALS
HEART RATE: 88 BPM | TEMPERATURE: 98.2 F | WEIGHT: 103 LBS | SYSTOLIC BLOOD PRESSURE: 114 MMHG | DIASTOLIC BLOOD PRESSURE: 66 MMHG | HEIGHT: 62 IN | BODY MASS INDEX: 18.95 KG/M2

## 2019-10-18 DIAGNOSIS — A04.72 C. DIFFICILE COLITIS: Primary | ICD-10-CM

## 2019-10-18 PROCEDURE — 99213 OFFICE O/P EST LOW 20 MIN: CPT | Performed by: PHYSICIAN ASSISTANT

## 2019-10-18 ASSESSMENT — MIFFLIN-ST. JEOR: SCORE: 870.45

## 2019-10-18 NOTE — PROGRESS NOTES
Subjective     Ghada Magana is a 84 year old female who presents to clinic today for the following health issues:    HPI   Patient is here because her c-diff is back.  Initially the test came back cleared but then they got a letter saying that it is again positive. She's having diarrhea. She notes it is possibly improving now. Less frequent, less notable blood. Her  thinks there's a very little bit of improvement.     She's had no fevers.  Denies other other symptoms or issues.    Patient Active Problem List   Diagnosis     Congenital anomaly of lung     Osteoporosis     Anemia     Hyperlipidemia LDL goal <130     Hypertension goal BP (blood pressure) < 140/90     Advanced directives, counseling/discussion     Chronic cough     MCI (mild cognitive impairment) with memory loss     Mycobacterium avium infection (H)     Mild dementia (H)     Mild major depression (H)          Patient Active Problem List   Diagnosis     Congenital anomaly of lung     Osteoporosis     Anemia     Hyperlipidemia LDL goal <130     Hypertension goal BP (blood pressure) < 140/90     Advanced directives, counseling/discussion     Chronic cough     MCI (mild cognitive impairment) with memory loss     Mycobacterium avium infection (H)     Mild dementia (H)     Mild major depression (H)     Past Surgical History:   Procedure Laterality Date     SURGICAL HISTORY OF -       s/p bronchoscopy        Social History     Tobacco Use     Smoking status: Never Smoker     Smokeless tobacco: Never Used   Substance Use Topics     Alcohol use: Yes     Comment: occasionally     Family History   Problem Relation Age of Onset     Cancer Daughter         ovarian cancer     Cerebrovascular Disease Father      Hypertension Father          Reviewed and updated as needed this visit by Provider         Review of Systems   ROS COMP: Constitutional, HEENT, cardiovascular, pulmonary, gi and gu systems are negative, except as otherwise noted.      Objective   "  /66 (Cuff Size: Adult Regular)   Pulse 88   Temp 98.2  F (36.8  C) (Oral)   Ht 1.575 m (5' 2\")   Wt 46.7 kg (103 lb)   BMI 18.84 kg/m    Body mass index is 18.84 kg/m .  Physical Exam   GENERAL: healthy, alert and no distress  ABDOMEN: soft, nontender, no hepatosplenomegaly, no masses and bowel sounds normal    Diagnostic Test Results:  Labs reviewed in Epic        Assessment & Plan     (A04.72) C. difficile colitis  (primary encounter diagnosis)  Comment:   Plan: Mild improvement. Tapering down on Vanco. Continue management. Follow up as needed - let me know next week if not seeing improvements.      Chip Barlow, MPAS, PA-C   "

## 2019-10-24 ENCOUNTER — TELEPHONE (OUTPATIENT)
Dept: FAMILY MEDICINE | Facility: CLINIC | Age: 84
End: 2019-10-24

## 2019-10-24 NOTE — TELEPHONE ENCOUNTER
Reason for Call:  Other call back    Detailed comments: Patient  calling back about patient symptoms.  advised that frequency has improved, but there is no improvement in stool consistency. Please call back with further assistance.    Phone Number Patient can be reached at: Home number on file [phone] (home)    Best Time: anytime    Can we leave a detailed message on this number? YES    Call taken on 10/24/2019 at 3:26 PM by Jorge Musa

## 2019-10-25 NOTE — TELEPHONE ENCOUNTER
Patient's  called and informed about below message.  He will update the clinic early next week.  He plans on going to get Probiotics as suggested during his last visit.    Eleni Ndiaye RN

## 2019-10-25 NOTE — TELEPHONE ENCOUNTER
Reviewed. I'm glad it is improving a little. I think this is a good sign. I'd say continue the antibiotics for now. Keep us posted into early next week. The diarrhea can last some time due to the antibiotics and the infection itself.     Thanks.  Chip Barlow, MPAS, PA-C

## 2019-11-07 ENCOUNTER — TRANSFERRED RECORDS (OUTPATIENT)
Dept: HEALTH INFORMATION MANAGEMENT | Facility: CLINIC | Age: 84
End: 2019-11-07

## 2019-11-11 DIAGNOSIS — F32.0 MILD MAJOR DEPRESSION (H): ICD-10-CM

## 2019-11-12 ENCOUNTER — NURSING HOME VISIT (OUTPATIENT)
Dept: GERIATRICS | Facility: CLINIC | Age: 84
End: 2019-11-12
Payer: MEDICARE

## 2019-11-12 VITALS
TEMPERATURE: 97.6 F | SYSTOLIC BLOOD PRESSURE: 163 MMHG | BODY MASS INDEX: 17.55 KG/M2 | OXYGEN SATURATION: 95 % | HEART RATE: 115 BPM | DIASTOLIC BLOOD PRESSURE: 90 MMHG | HEIGHT: 66 IN | WEIGHT: 109.2 LBS | RESPIRATION RATE: 16 BRPM

## 2019-11-12 DIAGNOSIS — Z86.19 HISTORY OF MAI INFECTION: ICD-10-CM

## 2019-11-12 DIAGNOSIS — R53.81 PHYSICAL DECONDITIONING: ICD-10-CM

## 2019-11-12 DIAGNOSIS — G30.9 ALZHEIMER'S DEMENTIA WITHOUT BEHAVIORAL DISTURBANCE, UNSPECIFIED TIMING OF DEMENTIA ONSET: ICD-10-CM

## 2019-11-12 DIAGNOSIS — A04.71 RECURRENT CLOSTRIDIOIDES DIFFICILE DIARRHEA: ICD-10-CM

## 2019-11-12 DIAGNOSIS — S72.141D CLOSED DISPLACED INTERTROCHANTERIC FRACTURE OF RIGHT FEMUR WITH ROUTINE HEALING, SUBSEQUENT ENCOUNTER: ICD-10-CM
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
## 2019-11-12 DIAGNOSIS — E78.5 HYPERLIPIDEMIA LDL GOAL <130: ICD-10-CM

## 2019-11-12 DIAGNOSIS — F32.0 MILD MAJOR DEPRESSION (H): ICD-10-CM

## 2019-11-12 DIAGNOSIS — Z78.9 IMPAIRED MOBILITY AND ACTIVITIES OF DAILY LIVING: ICD-10-CM

## 2019-11-12 DIAGNOSIS — Z47.89 AFTERCARE FOLLOWING SURGERY OF THE MUSCULOSKELETAL SYSTEM: Primary | ICD-10-CM

## 2019-11-12 DIAGNOSIS — J84.10 PULMONARY FIBROSIS (H): ICD-10-CM

## 2019-11-12 DIAGNOSIS — F02.80 ALZHEIMER'S DEMENTIA WITHOUT BEHAVIORAL DISTURBANCE, UNSPECIFIED TIMING OF DEMENTIA ONSET: ICD-10-CM

## 2019-11-12 DIAGNOSIS — Z86.19 HX OF CLOSTRIDIUM DIFFICILE INFECTION: ICD-10-CM

## 2019-11-12 DIAGNOSIS — D62 ANEMIA DUE TO BLOOD LOSS, ACUTE: ICD-10-CM

## 2019-11-12 DIAGNOSIS — M80.00XD AGE-RELATED OSTEOPOROSIS WITH CURRENT PATHOLOGICAL FRACTURE WITH ROUTINE HEALING, SUBSEQUENT ENCOUNTER: ICD-10-CM

## 2019-11-12 DIAGNOSIS — Z74.09 IMPAIRED MOBILITY AND ACTIVITIES OF DAILY LIVING: ICD-10-CM

## 2019-11-12 DIAGNOSIS — I10 HYPERTENSION GOAL BP (BLOOD PRESSURE) < 140/90: ICD-10-CM

## 2019-11-12 PROCEDURE — 99310 SBSQ NF CARE HIGH MDM 45: CPT | Performed by: NURSE PRACTITIONER

## 2019-11-12 RX ORDER — ACETAMINOPHEN 500 MG
TABLET ORAL
Start: 2019-11-12 | End: 2019-12-20

## 2019-11-12 RX ORDER — ASPIRIN 81 MG/1
TABLET ORAL
Qty: 776 TABLET | Refills: 0
Start: 2019-11-12 | End: 2019-11-23

## 2019-11-12 RX ORDER — ACETAMINOPHEN 325 MG/1
650 TABLET ORAL EVERY 4 HOURS PRN
COMMUNITY
End: 2019-11-12

## 2019-11-12 RX ORDER — AMLODIPINE BESYLATE 5 MG/1
5 TABLET ORAL DAILY
Qty: 90 TABLET | Refills: 0
Start: 2019-11-12 | End: 2019-12-02

## 2019-11-12 RX ORDER — VANCOMYCIN HYDROCHLORIDE 125 MG/1
CAPSULE ORAL
Qty: 91 CAPSULE | Refills: 0
Start: 2019-11-12 | End: 2020-02-17

## 2019-11-12 RX ORDER — IPRATROPIUM BROMIDE 21 UG/1
2 SPRAY, METERED NASAL 2 TIMES DAILY PRN
COMMUNITY
End: 2021-05-06

## 2019-11-12 RX ORDER — ASPIRIN 81 MG/1
81 TABLET ORAL DAILY
COMMUNITY
End: 2019-11-12

## 2019-11-12 ASSESSMENT — MIFFLIN-ST. JEOR: SCORE: 962.08

## 2019-11-12 NOTE — TELEPHONE ENCOUNTER
"Requested Prescriptions   Pending Prescriptions Disp Refills     mirtazapine (REMERON) 30 MG tablet [Pharmacy Med Name: Mirtazapine Oral Tablet 30 MG]  Last Written Prescription Date:  5/14/2019  Last Fill Quantity: 90 tabs,  # refills: 1   Last office visit: 10/18/2019 with prescribing provider:  Cain   Future Office Visit:   90 tablet 0     Sig: TAKE ONE TABLET BY MOUTH EVERY NIGHT AT BEDTIME       Atypical Antidepressants Protocol Failed - 11/11/2019  1:21 PM        Failed - Patient has PHQ-9 score less than 5 in past 6 months.     Please review last PHQ-9 score.           Passed - Medication active on med list        Passed - Patient is age 18 or older        Passed - No active pregnancy on record        Passed - No positive pregnancy test in past 12 mos        Passed - Recent (6 mo) or future (30 days) visit within the authorizing provider's specialty     Patient had office visit in the last 6 months or has a visit in the next 30 days with authorizing provider or within the authorizing provider's specialty.  See \"Patient Info\" tab in inbasket, or \"Choose Columns\" in Meds & Orders section of the refill encounter.             "

## 2019-11-12 NOTE — PROGRESS NOTES
[place] [facility]  PRIMARY CARE PROVIDER AND CLINIC:  MERON EASON PA-C, [address] [facility] [address]  Chief Complaint   Patient presents with     Hospital F/U     [place] [facility] Record Number:  [id number]  Place of Service where encounter took place:  [facility] ([facility]) [[id number]]    Ghada Magana  is a 84 year old  (1935), admitted to the above facility from  [facility] . Hospital stay 11/7/19 through 11/11/19..  Admitted to this facility for  rehab, medical management and nursing care.    HPI:    HPI information obtained from: facility chart records, facility staff, patient report, [facility] chart review and Care Everywhere [facility] chart review.   Brief Summary of Hospital Course:   Patient is an 84 year old woman with PMH of subpleural pulmonary fibrosis with history of TEETEE infection (completed antibiotic course) f/b [facility], Alzheimer's Dementia, Mild Major Depression, HTN, HLD, hypothyroidism, osteoporosis who recently had been hospitalized 9/1-9/6/19 for Urosepsis with C diff infection, now presenting after mechanical fall at home with subsequent right intertrochanteric femur fracture s/p nailing on 11/8/19.  Hospitalization was c/b ABL anemia (10.4 --> 6.7 s/p 1 unit pRBC --> 9.9 by discharge) and some hypotension for which amlodipine was held.  She continues on her pulsed taper of po Vancomycin for C diff infection.  She was transferred to TCU for therapy, nursing cares and medical management.    Med changes:  Hosp: discontinue amlodipine, ASA from daily --> 81 mg BID, (new) oxycodone for pain    Updates on Status Since Skilled nursing Admission:   Patient is met today in her TCU room where she reports tolerable right hip pain; denies any other pain.  She denies SOB, CP, HA< lightheadedness nausea; endorses diarrhea and being tired.  She reports she had trouble falling asleep last night.  (noted she may be a poor historian d/t dementia).  She endorses  roughly 3 liquid stools per day. [facility] EHR confirming this.   Nursing reports she was not told patient did not sleep last night.  She endorses patient had some pain this AM and patient was given 5 mg oxycodone, likely why she was sleepier.      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  PAST SURGICAL HISTORY:   has a past surgical history that includes surgical history of - .  FAMILY HISTORY: family history includes Cancer in her daughter; Cerebrovascular Disease in her father; Hypertension in her father.  SOCIAL HISTORY:   reports that she has never smoked. She has never used smokeless tobacco. She reports current alcohol use. She reports that she does not use drugs.    Post Discharge Medication Reconciliation Status: discharge medications reconciled, continue medications without change    Current Outpatient Medications   Medication Sig Dispense Refill     acetaminophen (TYLENOL) 500 MG tablet Take 2 tablets (1,000 mg) by mouth 2 times daily. May also take 2 tablets (1,000 mg) daily as needed for mild pain.       amLODIPine (NORVASC) 5 MG tablet Take 1 tablet (5 mg) by mouth daily Hold for SBP <110 90 tablet 0     aspirin 81 MG EC tablet Take 1 tablet (81 mg) by mouth 2 times daily for 28 days, THEN 1 tablet (81 mg) 2 times daily. 776 tablet 0     calcium carbonate-vitamin D (OS-ARMANDO) 600-400 MG-UNIT chewable tablet Take 1 chew tab by mouth daily       carvedilol (COREG) 25 MG tablet TAKE 1 TABLET BY MOUTH TWICE DAILY WITH MEALS 180 tablet 0     donepezil (ARICEPT) 10 MG tablet Take 1 tablet (10 mg) by mouth At Bedtime 90 tablet 1     ipratropium (ATROVENT) 0.03 % nasal spray Spray 2 sprays into both  "nostrils 2 times daily as needed for rhinitis       losartan (COZAAR) 100 MG tablet Take 1 tablet by mouth once daily.  90 tablet 0     memantine (NAMENDA) 5 MG tablet 1st Week: 5 mg in evening. 2nd Week: 5 mg two times/day. 3rd Week: 5 mg in morning, 10 mg in evening.  10 mg twice daily thereafter. 360 tablet 1     mirtazapine (REMERON) 30 MG tablet Take 1 tablet (30 mg) by mouth At Bedtime 90 tablet 1     OXYCODONE HCL PO Take 2.5 mg by mouth every 4 hours as needed       sertraline (ZOLOFT) 100 MG tablet TAKE 1 TABLET BY MOUTH ONCE DAILY 90 tablet 0     thiamine mononitrate 100 MG TABS Take 100 mg by mouth daily 30 tablet 11     UNKNOWN MED DOSAGE timolol eye gtts one drop in each eye twice daily       vancomycin (VANCOCIN) 125 MG capsule Pulsed-tapered regimen: 125 mg 4 times daily for 14 days, then 125 mg twice daily for 7 days, then 125 mg once daily for 7 days, the every other day for 4 weeks 91 capsule 0       ROS:  Limited secondary to cognitive impairment but today pt reports 10 point ROS of systems including Constitutional, Eyes, Respiratory, Cardiovascular, Gastroenterology, Genitourinary, Integumentary, Musculoskeletal, Psychiatric were all negative except for pertinent positives noted in my HPI.    Vitals:  BP (!) 163/90   Pulse 115   Temp 97.6  F (36.4  C)   Resp 16   Ht 1.676 m (5' 6\")   Wt 49.5 kg (109 lb 3.2 oz)   SpO2 95%   BMI 17.63 kg/m    Exam:  GENERAL APPEARANCE:  Alert but sleepier as our visit continued , in no distress, frail   RESP:  respiratory effort and palpation of chest normal, auscultation of lungs with some mild exp wheezes (?chronic?) , no respiratory distress  CV:  Palpation and auscultation of heart done , rate and rhythm regular, no murmur, no LE peripheral edema  ABDOMEN:  normal bowel sounds, soft, nontender, no hepatosplenomegaly or other masses  M/S:   Gait and station with w/c for mobility but stood today with SBA, Digits and nails with arthritic changes, reduced " muscle mass  SKIN:  Inspection and Palpation of skin and subcutaneous tissue fragile, pale.  Aquacel in place without s/s of infection present, no shadow drainage.   PSYCH:  insight and judgement, memory with impairment, affect and mood normal, follows commands readily      Lab/Diagnostic data:  Reviewed in Care Everywhere     ASSESSMENT/PLAN:  Aftercare following surgery of the musculoskeletal system  Closed  Mildly displaced intertrochanteric fracture of right femur with routine healing, subsequent encounter  Fall, subsequent encounter  Age-related osteoporosis with current pathological fracture with routine healing, subsequent encounter  Mechanical fall at home, Head CT neg  4/2013 DEXA with most neg T score -2.6 at right femur and spine  Subsequent mildly displaced right intertrochanteric femur fracture s/p nailing 11/8/19 by TCDr Rupesh VAZQUEZ  PTA ASA 81 mg daily --> 81 mg BID x 30 days  On PTA Calcium-Vit D3 600 mg-200 units daily (last Ca: 9.3, vitamin D level 71)   WBAT    Analgesia with (new) oxycodone 2.5-5 mg q4 hours PRN, PTA Tylenol 650 mg q4 hours PRN  Patient reports tolerable pain in right hip  Exam today with Aquacel in place, no shadow drainage, no s/s of infection present (although not whole dressing able to be viewed)     PLAN:  - (changed) ASA 81 mg BID until 12/8/19 --> ASA 81 mg daily   - WBAT with Physical Therapy, Occupational Therapy for rehab  - Schedule Tylenol 1000 mg BID to reduce use of oxycodone  - (new) Tylenol 1000 mg daily for mild pain  - (change/new) oxycodone 2.5 mg q 4 hours PRN for severe pain   - monitor for s/s of infection present.     Anemia due to blood loss, acute  Hgb 10.4 --> 6.7, s/p 1 unit pRBC --> 9.9 by discharge  No bleeding noted on aquacel dressing    PLAN:  - recheck Hgb for stability  - monitor for bleeding     Hx of Clostridium difficile infection  9/1-9/6 hospitalization with urosepsis c/b C diff infection  On pulsed po vancoycin taper, now 125 mg every other  day x 4 weeks (stop 12/6/19)    Currently patient denies abdominal cramping, reports roughly 3 liquid stools per day.     PLAN:  - PTA po vancomycin 125 mg every other day x 4 weeks (stop 12/6/19)    Hypertension goal BP (blood pressure) < 140/90  Hyperlipidemia LDL goal <130  On PTA carvedilol 25 mg BID, losartan 100 mg daily,   PTA amlodipine DC'd    BPs 170-180s/80s  HRs variable: 98, 51  Patient denies CP, HA< lightheadedness (may be poor historian)     PLAN:  - continue PTA carvedilol, losartan  - restart PTA amlodipine 5 mg q HS but hold for SBP <110   - BP goals are <150/90 mm Hg.This is higher than ACC and AHA recommendations due to risk for hypotension, risk of dizziness and falls, risk of tissue/cerebral hypoperfusion and frailty.     Pulmonary fibrosis (H)  History of TEETEE infection - antibiotics complete  F/b [facility]  Per Care Everywhere: 9/2 CT of the chest without contrast ordered showed scattered subpleural interstitial scarring and fibrosis with no change from previous CT. Small scattered subpleural pulmonary nodules, not enlarging per radiologist.  Not on O2 at baseline    On PTA ipratropium 0.03% nasal spray 2 sprays BID PRN for wheezing  Sats 92-96% on RA  Patient reports no SOB  LS with mild exp wheeze that may be chronic - can ask family.     PLAN:  - monitor respiratory status closely   - reduce use of oxycodone as much as possible  - pulm toileting   - continue PRN ipratropium PRN     Alzheimer's dementia without behavioral disturbance, unspecified timing of dementia onset (H)  Mild major depression (H)  [facility]UST January 2017 - 20/30  Resides with spouse at home  F/b Dr Ordonez, Neurology    On PTA donepezil 10 mg q HS, memantine 10 mg BID, mirtazapine 30 mg q HS, sertraline 100 mg q HS,   A&O x 1-2 today (knew self,month but not year nor location or reason for hospitalization or TCU stay)     Patient reports she did not sleep well last night; nursing notes no reports of patient not  sleeping well.     PLAN:  - continue PTA donepezil, memantine, mirtazapine, sertraline  - new on oxycodone for pain so higher risk for delirium (reducing dosing today)  - Occupational Therapy for cognitive testing   - monitor for SNF PHQ9 results  - f/u with Dr Ordonez as planned   - monitor sleep but at this time, allow patient to settle into new environment first.     Impaired mobility and activities of daily living  Physical deconditioning  2/2 advanced age, dementia, falls, hospitalizations with med changes, pain, etc.     PLAN:  - Physical Therapy, Occupational Therapy for strengthening, mobility, rehab, etc.        Orders written by provider at facility  1. Amlodipine 5 mg q HS. Hold for SBP <110  2. BMP, Hgb 11/18/19  3. Po Vanco stop date - 12/6/19  4. discharge duplicate Calcium, VIt D  5. Change oxycodone to 2.5 vmg q 4 hours PRN only  6. Tylenol 1000 mg BID  7. Tylenol 1000 mg daily PRN    Total time spent with patient visit at the skilled nursing facility was >37 min including patient visit, review of past records and coordination of care with nursing. Greater than 50% of total time spent with counseling and coordinating care due to review of records, coordination with nursing, patient visit with discussion about diarrhea, pain management, exam, therapy goals, post TCU goals to return home with .  Will plan to call family tomorrow to discuss goals and answer questions, etc. Coordination also with nursing staff about transportation needs to/from appointments.      Electronically signed by:  TUNG Wong CNP

## 2019-11-12 NOTE — LETTER
11/12/2019        RE: Ghada Magana  [address]  [facility] [address]        [facility]  PRIMARY CARE PROVIDER AND CLINIC:  MERON EASON PA-C, [address] [facility] [address]  Chief Complaint   Patient presents with     Hospital F/U     [facility] Medical Record Number:  [id number]  [facility] where encounter took place[facility] [[id number]    Ghada Magana  is a 84 year old  (1935), admitted to the above facility from  [facility] . Hospital stay 11/7/19 through 11/11/19..  Admitted to this facility for  rehab, medical management and nursing care.    HPI:    HPI information obtained from: facility chart records, facility staff, patient report, [facility] chart review and Care Everywhere [facility] chart review.   Brief Summary of Hospital Course:   Patient is an 84 year old woman with PMH of subpleural pulmonary fibrosis with history of TEETEE infection (completed antibiotic course) f/b [facility], Alzheimer's Dementia, Mild Major Depression, HTN, HLD, hypothyroidism, osteoporosis who recently had been hospitalized 9/1-9/6/19 for Urosepsis with C diff infection, now presenting after mechanical fall at home with subsequent right intertrochanteric femur fracture s/p nailing on 11/8/19.  Hospitalization was c/b ABL anemia (10.4 --> 6.7 s/p 1 unit pRBC --> 9.9 by discharge) and some hypotension for which amlodipine was held.  She continues on her pulsed taper of po Vancomycin for C diff infection.  She was transferred to TCU for therapy, nursing cares and medical management.    Med changes:  Hosp: discontinue amlodipine, ASA from daily --> 81 mg BID, (new) oxycodone for pain    Updates on Status Since Skilled nursing Admission:   Patient is met today in her TCU room where she reports tolerable right hip pain; denies any other pain.  She denies SOB, CP, HA< lightheadedness nausea; endorses diarrhea and being tired.  She reports she  had trouble falling asleep last night.  (noted she may be a poor historian d/t dementia).  She endorses roughly 3 liquid stools per day. SNF EHR confirming this.   Nursing reports she was not told patient did not sleep last night.  She endorses patient had some pain this AM and patient was given 5 mg oxycodone, likely why she was sleepier.      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  PAST SURGICAL HISTORY:   has a past surgical history that includes surgical history of - .  FAMILY HISTORY: family history includes Cancer in her daughter; Cerebrovascular Disease in her father; Hypertension in her father.  SOCIAL HISTORY:   reports that she has never smoked. She has never used smokeless tobacco. She reports current alcohol use. She reports that she does not use drugs.    Post Discharge Medication Reconciliation Status: discharge medications reconciled, continue medications without change    Current Outpatient Medications   Medication Sig Dispense Refill     acetaminophen (TYLENOL) 500 MG tablet Take 2 tablets (1,000 mg) by mouth 2 times daily. May also take 2 tablets (1,000 mg) daily as needed for mild pain.       amLODIPine (NORVASC) 5 MG tablet Take 1 tablet (5 mg) by mouth daily Hold for SBP <110 90 tablet 0     aspirin 81 MG EC tablet Take 1 tablet (81 mg) by mouth 2 times daily for 28 days, THEN 1 tablet (81 mg) 2 times daily. 776 tablet 0     calcium carbonate-vitamin D (OS-ARMANDO) 600-400 MG-UNIT chewable tablet Take 1 chew tab by mouth daily       carvedilol (COREG) 25 MG tablet TAKE 1 TABLET BY MOUTH TWICE DAILY WITH MEALS 180 tablet 0     donepezil (ARICEPT) 10 MG tablet Take 1 tablet (10 mg) by  "mouth At Bedtime 90 tablet 1     ipratropium (ATROVENT) 0.03 % nasal spray Spray 2 sprays into both nostrils 2 times daily as needed for rhinitis       losartan (COZAAR) 100 MG tablet Take 1 tablet by mouth once daily.  90 tablet 0     memantine (NAMENDA) 5 MG tablet 1st Week: 5 mg in evening. 2nd Week: 5 mg two times/day. 3rd Week: 5 mg in morning, 10 mg in evening.  10 mg twice daily thereafter. 360 tablet 1     mirtazapine (REMERON) 30 MG tablet Take 1 tablet (30 mg) by mouth At Bedtime 90 tablet 1     OXYCODONE HCL PO Take 2.5 mg by mouth every 4 hours as needed       sertraline (ZOLOFT) 100 MG tablet TAKE 1 TABLET BY MOUTH ONCE DAILY 90 tablet 0     thiamine mononitrate 100 MG TABS Take 100 mg by mouth daily 30 tablet 11     UNKNOWN MED DOSAGE timolol eye gtts one drop in each eye twice daily       vancomycin (VANCOCIN) 125 MG capsule Pulsed-tapered regimen: 125 mg 4 times daily for 14 days, then 125 mg twice daily for 7 days, then 125 mg once daily for 7 days, the every other day for 4 weeks 91 capsule 0       ROS:  Limited secondary to cognitive impairment but today pt reports 10 point ROS of systems including Constitutional, Eyes, Respiratory, Cardiovascular, Gastroenterology, Genitourinary, Integumentary, Musculoskeletal, Psychiatric were all negative except for pertinent positives noted in my HPI.    Vitals:  BP (!) 163/90   Pulse 115   Temp 97.6  F (36.4  C)   Resp 16   Ht 1.676 m (5' 6\")   Wt 49.5 kg (109 lb 3.2 oz)   SpO2 95%   BMI 17.63 kg/m     Exam:  GENERAL APPEARANCE:  Alert but sleepier as our visit continued , in no distress, frail   RESP:  respiratory effort and palpation of chest normal, auscultation of lungs with some mild exp wheezes (?chronic?) , no respiratory distress  CV:  Palpation and auscultation of heart done , rate and rhythm regular, no murmur, no LE peripheral edema  ABDOMEN:  normal bowel sounds, soft, nontender, no hepatosplenomegaly or other masses  M/S:   Gait and " station with w/c for mobility but stood today with SBA, Digits and nails with arthritic changes, reduced muscle mass  SKIN:  Inspection and Palpation of skin and subcutaneous tissue fragile, pale.  Aquacel in place without s/s of infection present, no shadow drainage.   PSYCH:  insight and judgement, memory with impairment, affect and mood normal, follows commands readily      Lab/Diagnostic data:  Reviewed in Care Everywhere     ASSESSMENT/PLAN:  Aftercare following surgery of the musculoskeletal system  Closed  Mildly displaced intertrochanteric fracture of right femur with routine healing, subsequent encounter  Fall, subsequent encounter  Age-related osteoporosis with current pathological fracture with routine healing, subsequent encounter  Mechanical fall at home, Head CT neg  4/2013 DEXA with most neg T score -2.6 at right femur and spine  Subsequent mildly displaced right intertrochanteric femur fracture s/p nailing 11/8/19 by TCODr Goddard  PTA ASA 81 mg daily --> 81 mg BID x 30 days  On PTA Calcium-Vit D3 600 mg-200 units daily (last Ca: 9.3, vitamin D level 71)   WBAT    Analgesia with (new) oxycodone 2.5-5 mg q4 hours PRN, PTA Tylenol 650 mg q4 hours PRN  Patient reports tolerable pain in right hip  Exam today with Aquacel in place, no shadow drainage, no s/s of infection present (although not whole dressing able to be viewed)     PLAN:  - (changed) ASA 81 mg BID until 12/8/19 --> ASA 81 mg daily   - WBAT with Physical Therapy, Occupational Therapy for rehab  - Schedule Tylenol 1000 mg BID to reduce use of oxycodone  - (new) Tylenol 1000 mg daily for mild pain  - (change/new) oxycodone 2.5 mg q 4 hours PRN for severe pain   - monitor for s/s of infection present.     Anemia due to blood loss, acute  Hgb 10.4 --> 6.7, s/p 1 unit pRBC --> 9.9 by discharge  No bleeding noted on aquacel dressing    PLAN:  - recheck Hgb for stability  - monitor for bleeding     Hx of Clostridium difficile infection  9/1-9/6  hospitalization with urosepsis c/b C diff infection  On pulsed po vancoycin taper, now 125 mg every other day x 4 weeks (stop 12/6/19)    Currently patient denies abdominal cramping, reports roughly 3 liquid stools per day.     PLAN:  - PTA po vancomycin 125 mg every other day x 4 weeks (stop 12/6/19)    Hypertension goal BP (blood pressure) < 140/90  Hyperlipidemia LDL goal <130  On PTA carvedilol 25 mg BID, losartan 100 mg daily,   PTA amlodipine DC'd    BPs 170-180s/80s  HRs variable: 98, 51  Patient denies CP, HA< lightheadedness (may be poor historian)     PLAN:  - continue PTA carvedilol, losartan  - restart PTA amlodipine 5 mg q HS but hold for SBP <110   - BP goals are <150/90 mm Hg.This is higher than ACC and AHA recommendations due to risk for hypotension, risk of dizziness and falls, risk of tissue/cerebral hypoperfusion and frailty.     Pulmonary fibrosis (H)  History of TEETEE infection - antibiotics complete  F/b [facility]  Per Care Everywhere: 9/2 CT of the chest without contrast ordered showed scattered subpleural interstitial scarring and fibrosis with no change from previous CT. Small scattered subpleural pulmonary nodules, not enlarging per radiologist.  Not on O2 at baseline    On PTA ipratropium 0.03% nasal spray 2 sprays BID PRN for wheezing  Sats 92-96% on RA  Patient reports no SOB  LS with mild exp wheeze that may be chronic - can ask family.     PLAN:  - monitor respiratory status closely   - reduce use of oxycodone as much as possible  - pulm toileting   - continue PRN ipratropium PRN     Alzheimer's dementia without behavioral disturbance, unspecified timing of dementia onset (H)  Mild major depression (H)  [facility] January 2017 - 20/30  Resides with spouse at home  F/b Dr Ordonez, Neurology    On PTA donepezil 10 mg q HS, memantine 10 mg BID, mirtazapine 30 mg q HS, sertraline 100 mg q HS,   A&O x 1-2 today (knew self,month but not year nor location or reason for hospitalization or  TCU stay)     Patient reports she did not sleep well last night; nursing notes no reports of patient not sleeping well.     PLAN:  - continue PTA donepezil, memantine, mirtazapine, sertraline  - new on oxycodone for pain so higher risk for delirium (reducing dosing today)  - Occupational Therapy for cognitive testing   - monitor for SNF PHQ9 results  - f/u with Dr Ordonez as planned   - monitor sleep but at this time, allow patient to settle into new environment first.     Impaired mobility and activities of daily living  Physical deconditioning  2/2 advanced age, dementia, falls, hospitalizations with med changes, pain, etc.     PLAN:  - Physical Therapy, Occupational Therapy for strengthening, mobility, rehab, etc.        Orders written by provider at facility  1. Amlodipine 5 mg q HS. Hold for SBP <110  2. BMP, Hgb 11/18/19  3. Po Vanco stop date - 12/6/19  4. discharge duplicate Calcium, VIt D  5. Change oxycodone to 2.5 vmg q 4 hours PRN only  6. Tylenol 1000 mg BID  7. Tylenol 1000 mg daily PRN    Total time spent with patient visit at the skilled nursing facility was >37 min including patient visit, review of past records and coordination of care with nursing. Greater than 50% of total time spent with counseling and coordinating care due to review of records, coordination with nursing, patient visit with discussion about diarrhea, pain management, exam, therapy goals, post TCU goals to return home with .  Will plan to call family tomorrow to discuss goals and answer questions, etc. Coordination also with nursing staff about transportation needs to/from appointments.      Electronically signed by:  TUNG Wong CNP                         Sincerely,        TUNG Wong CNP

## 2019-11-13 RX ORDER — MIRTAZAPINE 30 MG/1
TABLET, FILM COATED ORAL
Qty: 90 TABLET | Refills: 0 | Status: SHIPPED | OUTPATIENT
Start: 2019-11-13 | End: 2020-03-02

## 2019-11-13 NOTE — TELEPHONE ENCOUNTER
Routing refill request to provider for review/approval because:  Failed protocol: PHQ-9     Yen Pyle RN

## 2019-11-17 NOTE — PROGRESS NOTES
[place] [facility]  [facility] Record Number:  [id number]  Place of Service where encounter took place:  [facility] () [[id number]]  Chief Complaint   Patient presents with     RECHECK       HPI:    Ghada Magana  is a 84 year old (1935), who is being seen today for an episodic care visit.  HPI information obtained from: facility chart records, facility staff, patient report, [facility] chart review and family/first contact pt's 's report. Today's concern is:     Aftercare following surgery of the musculoskeletal system  Closed displaced intertrochanteric fracture of right femur with routine healing, subsequent encounter  Fall, subsequent encounter  Age-related osteoporosis with current pathological fracture with routine healing, subsequent encounter  Anemia due to blood loss, acute  Hx of Clostridium difficile infection  Hypertension goal BP (blood pressure) < 140/90  Pulmonary fibrosis (H)  History of TEETEE infection  Alzheimer's dementia without behavioral disturbance, unspecified timing of dementia onset (H)  Hyperlipidemia LDL goal <130  Mild major depression (H)  Impaired mobility and activities of daily living  Physical deconditioning     Per Epic note/Hx:  Patient is an 84 year old woman with PMH of subpleural pulmonary fibrosis with history of TEETEE infection (completed antibiotic course) f/b [facility], Alzheimer's Dementia, Mild Major Depression, HTN, HLD, hypothyroidism, osteoporosis who recently had been hospitalized 9/1-9/6/19 for Urosepsis with C diff infection, now presenting after mechanical fall at home with subsequent right intertrochanteric femur fracture s/p nailing on 11/8/19.  Hospitalization was c/b ABL anemia (10.4 --> 6.7 s/p 1 unit pRBC --> 9.9 by discharge) and some hypotension for which amlodipine was held.  She continues on her pulsed taper of po Vancomycin for C diff infection.  She was transferred to TCU for therapy, nursing cares and medical  "management.  ____________   Med changes:  Hosp: discontinue amlodipine, ASA from daily --> 81 mg BID, (new) oxycodone for pain  11/12/19: restart amlodipine 5 mg q HS. Hold for SBP <110, Change oxycodone to 2.5 vmg q 4 hours PRN only, Tylenol 1000 mg BID, Tylenol 1000 mg daily PRN    Patient is met today in her room where she reports pain with ambulation but overall finds it tolerable.  She continues to report insomnia but nursing reports no concerns with sleep. She denies SOB, CP, HA, lightheadedness, heartburn, upset stomach, constipation.  She endorses still having diarrhea and when questioned, she reports her stool is \"pieces\" but overall soft.  SNF EHR showing 2-4 BMs/day.  She denies abdominal cramping.   I spoke with her  who has no overt concerns except assuring she is stronger before able to come home as he has physical limitations in terms of being able to help her with many things.      Past Medical and Surgical History reviewed in Epic today.    MEDICATIONS:  Current Outpatient Medications   Medication Sig Dispense Refill     acetaminophen (TYLENOL) 500 MG tablet Take 2 tablets (1,000 mg) by mouth 2 times daily. May also take 2 tablets (1,000 mg) daily as needed for mild pain.       amLODIPine (NORVASC) 5 MG tablet Take 1 tablet (5 mg) by mouth daily Hold for SBP <110 90 tablet 0     aspirin 81 MG EC tablet Take 1 tablet (81 mg) by mouth 2 times daily for 28 days, THEN 1 tablet (81 mg) 2 times daily. 776 tablet 0     calcium carbonate-vitamin D (OS-ARMANDO) 600-400 MG-UNIT chewable tablet Take 1 chew tab by mouth daily       carvedilol (COREG) 25 MG tablet TAKE 1 TABLET BY MOUTH TWICE DAILY WITH MEALS 180 tablet 0     donepezil (ARICEPT) 10 MG tablet Take 1 tablet (10 mg) by mouth At Bedtime 90 tablet 1     ipratropium (ATROVENT) 0.03 % nasal spray Spray 2 sprays into both nostrils 2 times daily as needed for rhinitis       losartan (COZAAR) 100 MG tablet Take 1 tablet by mouth once daily.  90 tablet " "0     memantine (NAMENDA) 5 MG tablet 1st Week: 5 mg in evening. 2nd Week: 5 mg two times/day. 3rd Week: 5 mg in morning, 10 mg in evening.  10 mg twice daily thereafter. 360 tablet 1     mirtazapine (REMERON) 30 MG tablet TAKE ONE TABLET BY MOUTH EVERY NIGHT AT BEDTIME 90 tablet 0     OXYCODONE HCL PO Take 2.5 mg by mouth every 4 hours as needed       sertraline (ZOLOFT) 100 MG tablet TAKE 1 TABLET BY MOUTH ONCE DAILY 90 tablet 0     thiamine mononitrate 100 MG TABS Take 100 mg by mouth daily 30 tablet 11     UNKNOWN MED DOSAGE timolol eye gtts one drop in each eye twice daily       vancomycin (VANCOCIN) 125 MG capsule Pulsed-tapered regimen: 125 mg 4 times daily for 14 days, then 125 mg twice daily for 7 days, then 125 mg once daily for 7 days, the every other day for 4 weeks 91 capsule 0     REVIEW OF SYSTEMS:  Limited secondary to cognitive impairment but today pt reports 10 point ROS of systems including Constitutional, Eyes, Respiratory, Cardiovascular, Gastroenterology, Genitourinary, Integumentary, Musculoskeletal, Psychiatric were all negative except for pertinent positives noted in my HPI.    Objective:  BP (!) 181/84   Pulse 97   Temp 95.8  F (35.4  C)   Resp 18   Ht 1.676 m (5' 6\")   Wt 49.4 kg (108 lb 12.8 oz)   SpO2 95%   BMI 17.56 kg/m    Exam:  GENERAL APPEARANCE:  Alert, in no distress, pleasant  RESP:  respiratory effort and palpation of chest normal, auscultation of lungs clear , no respiratory distress  CV:  Palpation and auscultation of heart done , rate and rhythm regular, no murmur, no LE peripheral edema  ABDOMEN:  normal bowel sounds, soft, nontender, no hepatosplenomegaly or other masses  M/S:   Gait and station ambulatory with walker, Digits and nails with arthritic changes, reduced muscle mass  SKIN:  Inspection and Palpation of skin and subcutaneous tissue with Aquacel dressing on right hip  PSYCH:  insight and judgement, memory with significant impairment , affect and mood " normal, follows commands readily       Labs:   CBC RESULTS:   Recent Labs   Lab Test 10/25/18  1038 06/08/17  1751   WBC 10.5 8.5   RBC 3.52* 3.92   HGB 10.6* 11.9   HCT 33.1* 36.5   MCV 94 93   MCH 30.1 30.4   MCHC 32.0 32.6   RDW 13.9 13.1    358       Last Basic Metabolic Panel:  Recent Labs   Lab Test 11/20/18  1334 10/25/18  1038    132*   POTASSIUM 5.2 4.5   CHLORIDE 99 98   ARMANDO 9.3 9.8   CO2 28 26   BUN 22 19   CR 0.60 0.77   * 95       Liver Function Studies -   Recent Labs   Lab Test 11/20/18  1334 10/25/18  1038   PROTTOTAL 8.4 8.4   ALBUMIN 3.3* 3.5   BILITOTAL 0.6 0.5   ALKPHOS 116 97   AST 35 31   ALT 30 25       TSH   Date Value Ref Range Status   11/20/2018 3.22 0.40 - 4.00 mU/L Final   10/25/2018 2.38 0.40 - 4.00 mU/L Final   ]    ASSESSMENT/PLAN:  Aftercare following surgery of the musculoskeletal system  Closed  Mildly displaced intertrochanteric fracture of right femur with routine healing, subsequent encounter  Fall, subsequent encounter  Age-related osteoporosis with current pathological fracture with routine healing, subsequent encounter  Mechanical fall at home, Head CT neg  4/2013 DEXA with most neg T score -2.6 at right femur and spine  Subsequent mildly displaced right intertrochanteric femur fracture s/p nailing 11/8/19 by Dr Rupesh SANTOS  PTA ASA 81 mg daily --> 81 mg BID x 30 days  On PTA Calcium-Vit D3 600 mg-200 units daily (last Ca: 9.3, vitamin D level 71)   WBAT     Analgesia with (new) oxycodone 2.5 mg q4 hours PRN - used x 6 since admission (about daily in afternoon/evening)   PTA Tylenol 650 mg q4 hours PRN changed --> 1000 mg BID and 1000 mg daily PRN     Patient reports pain with ambulation, otherwise finds pain tolerable.   Exam today with Aquacel dressing in place without s/s of infection present.      PLAN:   - (changed) ASA 81 mg BID until 12/8/19 --> ASA 81 mg daily   - WBAT with Physical Therapy, Occupational Therapy for rehab  - (new) Tylenol 1000 mg  "BID to reduce use of oxycodone  - (new) Tylenol 1000 mg daily PRN for mild pain  - (change/new) oxycodone 2.5 mg q 4 hours PRN for severe pain   - monitor for s/s of infection present.      Anemia due to blood loss, acute  Hgb 10.4 --> 6.7, s/p 1 unit pRBC --> 9.9 by discharge  No bleeding noted  11/18/19 Hgb not ordered - can re-order for tomorrow.      PLAN:   - monitor for bleeding   - 11/19 Hgb for monitoring.      Hx of Clostridium difficile infection  9/1-9/6 hospitalization with urosepsis c/b C diff infection  On pulsed po vancoycin taper, now 125 mg every other day x 4 weeks (stop 12/6/19)     Currently patient reports diarrhea each time she voids and reports \"pieces\" but discussion seemingly stool is soft, not liquid. She denies abdominal cramping  SNF EHR showing 2-4 BMs/day.       PLAN:  - PTA po vancomycin 125 mg every other day x 4 weeks (stop 12/6/19)  - likely will need recheck after 12/6/19 for monitoring of presence of persistent infection.      Hypertension goal BP (blood pressure) < 140/90  Hyperlipidemia LDL goal <130  On PTA carvedilol 25 mg BID, losartan 100 mg daily,   PTA amlodipine restarted with parameters to hold - not held since starting      BPs 120-150s/60-70s  HRs 70-80s  Patient denies CP, HA, lightheadedness (may be poor historian)      PLAN:  - continue PTA carvedilol, losartan  - restarted PTA amlodipine 5 mg q HS but hold for SBP <110   - BP goals are <150/90 mm Hg.This is higher than ACC and AHA recommendations due to risk for hypotension, risk of dizziness and falls, risk of tissue/cerebral hypoperfusion and frailty.      Pulmonary fibrosis (H)  History of TEETEE infection - antibiotics complete  F/b [facility]  Per Care Everywhere: 9/2 CT of the chest without contrast ordered showed scattered subpleural interstitial scarring and fibrosis with no change from previous CT. Small scattered subpleural pulmonary nodules, not enlarging per radiologist.  Not on O2 at " baseline     On PTA ipratropium 0.03% nasal spray 2 sprays BID PRN for wheezing  Sats 95-96% on RA  Patient reports no SOB, no cough  LS  clear (improved since last visit)      PLAN:  - monitor respiratory status closely   - reduce use of oxycodone as much as possible  - pulm toileting   - continue PRN ipratropium PRN      Alzheimer's dementia without behavioral disturbance, unspecified timing of dementia onset (H)  Mild major depression (H)  [facility] January 2017 - 20/30  Resides with spouse at home  F/b Dr Ordonez, Neurology     On PTA donepezil 10 mg q HS, memantine 10 mg BID, mirtazapine 30 mg q HS, sertraline 100 mg q HS,   A&O x 1 today (knew month after some prompting but not year nor location or details of admission/recent events)      This TCU stay: [facility] 6/30, CPT 4/5.6  PHQ9 - 4  Patient reports she sleeps poorly but no concerns from nursing. Will continue to monitor.      PLAN:  - continue PTA donepezil, memantine, mirtazapine, sertraline  - new on oxycodone for pain so higher risk for delirium (reducing dosing today)  - Occupational Therapy for cognitive exercising  - f/u with Dr Ordonez as planned   - monitor sleep but at this time, allow patient to settle into new environment first.      Impaired mobility and activities of daily living  Physical deconditioning  2/2 advanced age, dementia, falls, hospitalizations with med changes, pain, etc.     Therapy update:  CGA for STS transfers  Ambulating 100 ft with 4WW, CGA  Mod A for LB dressing  SBA for toileting  [facility] 6/30  CPT 4/5.6     PLAN:  - Physical Therapy, Occupational Therapy for strengthening, mobility, rehab, etc.       Total time spent with patient visit at the skilled nursing facility was 25 min including patient visit, review of past records, phone call to patient contact and coordination with nursing and therapy. Greater than 50% of total time spent with counseling and coordinating care due to review of SNF EHR, patient visit with discussion of pain,  insomnia, therapy goals and reason for TCU stay, phone call to pt's  with discusion of current status and goals of therapy before returning home..  Electronically signed by:  TUNG Wong CNP

## 2019-11-18 ENCOUNTER — NURSING HOME VISIT (OUTPATIENT)
Dept: GERIATRICS | Facility: CLINIC | Age: 84
End: 2019-11-18
Payer: MEDICARE

## 2019-11-18 VITALS
OXYGEN SATURATION: 95 % | HEART RATE: 97 BPM | SYSTOLIC BLOOD PRESSURE: 181 MMHG | BODY MASS INDEX: 17.49 KG/M2 | HEIGHT: 66 IN | RESPIRATION RATE: 18 BRPM | DIASTOLIC BLOOD PRESSURE: 84 MMHG | WEIGHT: 108.8 LBS | TEMPERATURE: 95.8 F

## 2019-11-18 DIAGNOSIS — Z86.19 HX OF CLOSTRIDIUM DIFFICILE INFECTION: ICD-10-CM

## 2019-11-18 DIAGNOSIS — E78.5 HYPERLIPIDEMIA LDL GOAL <130: ICD-10-CM

## 2019-11-18 DIAGNOSIS — M80.00XD AGE-RELATED OSTEOPOROSIS WITH CURRENT PATHOLOGICAL FRACTURE WITH ROUTINE HEALING, SUBSEQUENT ENCOUNTER: ICD-10-CM

## 2019-11-18 DIAGNOSIS — F32.0 MILD MAJOR DEPRESSION (H): ICD-10-CM

## 2019-11-18 DIAGNOSIS — J84.10 PULMONARY FIBROSIS (H): ICD-10-CM

## 2019-11-18 DIAGNOSIS — I10 HYPERTENSION GOAL BP (BLOOD PRESSURE) < 140/90: ICD-10-CM

## 2019-11-18 DIAGNOSIS — F02.80 ALZHEIMER'S DEMENTIA WITHOUT BEHAVIORAL DISTURBANCE, UNSPECIFIED TIMING OF DEMENTIA ONSET: ICD-10-CM

## 2019-11-18 DIAGNOSIS — Z47.89 AFTERCARE FOLLOWING SURGERY OF THE MUSCULOSKELETAL SYSTEM: Primary | ICD-10-CM
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
## 2019-11-18 DIAGNOSIS — D62 ANEMIA DUE TO BLOOD LOSS, ACUTE: ICD-10-CM

## 2019-11-18 DIAGNOSIS — G30.9 ALZHEIMER'S DEMENTIA WITHOUT BEHAVIORAL DISTURBANCE, UNSPECIFIED TIMING OF DEMENTIA ONSET: ICD-10-CM

## 2019-11-18 DIAGNOSIS — Z74.09 IMPAIRED MOBILITY AND ACTIVITIES OF DAILY LIVING: ICD-10-CM

## 2019-11-18 DIAGNOSIS — R53.81 PHYSICAL DECONDITIONING: ICD-10-CM

## 2019-11-18 DIAGNOSIS — S72.141D CLOSED DISPLACED INTERTROCHANTERIC FRACTURE OF RIGHT FEMUR WITH ROUTINE HEALING, SUBSEQUENT ENCOUNTER: ICD-10-CM

## 2019-11-18 DIAGNOSIS — Z86.19 HISTORY OF MAI INFECTION: ICD-10-CM

## 2019-11-18 DIAGNOSIS — Z78.9 IMPAIRED MOBILITY AND ACTIVITIES OF DAILY LIVING: ICD-10-CM

## 2019-11-18 PROCEDURE — 99309 SBSQ NF CARE MODERATE MDM 30: CPT | Performed by: NURSE PRACTITIONER

## 2019-11-18 ASSESSMENT — MIFFLIN-ST. JEOR: SCORE: 960.26

## 2019-11-18 NOTE — LETTER
11/18/2019        RE: Ghada Magana  [address]  [facility] [address]        [facility]  [facility] Record Number:  [id number]  Place of Service where encounter took place[facility] [[id number]]  Chief Complaint   Patient presents with     RECHECK       HPI:    Ghada Magana  is a 84 year old (1935), who is being seen today for an episodic care visit.  HPI information obtained from: facility chart records, facility staff, patient report, [facility] chart review and family/first contact pt's 's report. Today's concern is:     Aftercare following surgery of the musculoskeletal system  Closed displaced intertrochanteric fracture of right femur with routine healing, subsequent encounter  Fall, subsequent encounter  Age-related osteoporosis with current pathological fracture with routine healing, subsequent encounter  Anemia due to blood loss, acute  Hx of Clostridium difficile infection  Hypertension goal BP (blood pressure) < 140/90  Pulmonary fibrosis (H)  History of TEETEE infection  Alzheimer's dementia without behavioral disturbance, unspecified timing of dementia onset (H)  Hyperlipidemia LDL goal <130  Mild major depression (H)  Impaired mobility and activities of daily living  Physical deconditioning     Per Epic note/Hx:  Patient is an 84 year old woman with PMH of subpleural pulmonary fibrosis with history of TEETEE infection (completed antibiotic course) f/b [facility], Alzheimer's Dementia, Mild Major Depression, HTN, HLD, hypothyroidism, osteoporosis who recently had been hospitalized 9/1-9/6/19 for Urosepsis with C diff infection, now presenting after mechanical fall at home with subsequent right intertrochanteric femur fracture s/p nailing on 11/8/19.  Hospitalization was c/b ABL anemia (10.4 --> 6.7 s/p 1 unit pRBC --> 9.9 by discharge) and some hypotension for which amlodipine was held.  She continues on her pulsed taper of po  "Vancomycin for C diff infection.  She was transferred to TCU for therapy, nursing cares and medical management.  ____________   Med changes:  Hosp: discontinue amlodipine, ASA from daily --> 81 mg BID, (new) oxycodone for pain  11/12/19: restart amlodipine 5 mg q HS. Hold for SBP <110, Change oxycodone to 2.5 vmg q 4 hours PRN only, Tylenol 1000 mg BID, Tylenol 1000 mg daily PRN    Patient is met today in her room where she reports pain with ambulation but overall finds it tolerable.  She continues to report insomnia but nursing reports no concerns with sleep. She denies SOB, CP, HA, lightheadedness, heartburn, upset stomach, constipation.  She endorses still having diarrhea and when questioned, she reports her stool is \"pieces\" but overall soft.  SNF EHR showing 2-4 BMs/day.  She denies abdominal cramping.   I spoke with her  who has no overt concerns except assuring she is stronger before able to come home as he has physical limitations in terms of being able to help her with many things.      Past Medical and Surgical History reviewed in Epic today.    MEDICATIONS:  Current Outpatient Medications   Medication Sig Dispense Refill     acetaminophen (TYLENOL) 500 MG tablet Take 2 tablets (1,000 mg) by mouth 2 times daily. May also take 2 tablets (1,000 mg) daily as needed for mild pain.       amLODIPine (NORVASC) 5 MG tablet Take 1 tablet (5 mg) by mouth daily Hold for SBP <110 90 tablet 0     aspirin 81 MG EC tablet Take 1 tablet (81 mg) by mouth 2 times daily for 28 days, THEN 1 tablet (81 mg) 2 times daily. 776 tablet 0     calcium carbonate-vitamin D (OS-ARMANDO) 600-400 MG-UNIT chewable tablet Take 1 chew tab by mouth daily       carvedilol (COREG) 25 MG tablet TAKE 1 TABLET BY MOUTH TWICE DAILY WITH MEALS 180 tablet 0     donepezil (ARICEPT) 10 MG tablet Take 1 tablet (10 mg) by mouth At Bedtime 90 tablet 1     ipratropium (ATROVENT) 0.03 % nasal spray Spray 2 sprays into both nostrils 2 times daily as " "needed for rhinitis       losartan (COZAAR) 100 MG tablet Take 1 tablet by mouth once daily.  90 tablet 0     memantine (NAMENDA) 5 MG tablet 1st Week: 5 mg in evening. 2nd Week: 5 mg two times/day. 3rd Week: 5 mg in morning, 10 mg in evening.  10 mg twice daily thereafter. 360 tablet 1     mirtazapine (REMERON) 30 MG tablet TAKE ONE TABLET BY MOUTH EVERY NIGHT AT BEDTIME 90 tablet 0     OXYCODONE HCL PO Take 2.5 mg by mouth every 4 hours as needed       sertraline (ZOLOFT) 100 MG tablet TAKE 1 TABLET BY MOUTH ONCE DAILY 90 tablet 0     thiamine mononitrate 100 MG TABS Take 100 mg by mouth daily 30 tablet 11     UNKNOWN MED DOSAGE timolol eye gtts one drop in each eye twice daily       vancomycin (VANCOCIN) 125 MG capsule Pulsed-tapered regimen: 125 mg 4 times daily for 14 days, then 125 mg twice daily for 7 days, then 125 mg once daily for 7 days, the every other day for 4 weeks 91 capsule 0     REVIEW OF SYSTEMS:  Limited secondary to cognitive impairment but today pt reports 10 point ROS of systems including Constitutional, Eyes, Respiratory, Cardiovascular, Gastroenterology, Genitourinary, Integumentary, Musculoskeletal, Psychiatric were all negative except for pertinent positives noted in my HPI.    Objective:  BP (!) 181/84   Pulse 97   Temp 95.8  F (35.4  C)   Resp 18   Ht 1.676 m (5' 6\")   Wt 49.4 kg (108 lb 12.8 oz)   SpO2 95%   BMI 17.56 kg/m     Exam:  GENERAL APPEARANCE:  Alert, in no distress, pleasant  RESP:  respiratory effort and palpation of chest normal, auscultation of lungs clear , no respiratory distress  CV:  Palpation and auscultation of heart done , rate and rhythm regular, no murmur, no LE peripheral edema  ABDOMEN:  normal bowel sounds, soft, nontender, no hepatosplenomegaly or other masses  M/S:   Gait and station ambulatory with walker, Digits and nails with arthritic changes, reduced muscle mass  SKIN:  Inspection and Palpation of skin and subcutaneous tissue with Aquacel dressing " on right hip  PSYCH:  insight and judgement, memory with significant impairment , affect and mood normal, follows commands readily       Labs:   CBC RESULTS:   Recent Labs   Lab Test 10/25/18  1038 06/08/17  1751   WBC 10.5 8.5   RBC 3.52* 3.92   HGB 10.6* 11.9   HCT 33.1* 36.5   MCV 94 93   MCH 30.1 30.4   MCHC 32.0 32.6   RDW 13.9 13.1    358       Last Basic Metabolic Panel:  Recent Labs   Lab Test 11/20/18  1334 10/25/18  1038    132*   POTASSIUM 5.2 4.5   CHLORIDE 99 98   ARMANDO 9.3 9.8   CO2 28 26   BUN 22 19   CR 0.60 0.77   * 95       Liver Function Studies -   Recent Labs   Lab Test 11/20/18  1334 10/25/18  1038   PROTTOTAL 8.4 8.4   ALBUMIN 3.3* 3.5   BILITOTAL 0.6 0.5   ALKPHOS 116 97   AST 35 31   ALT 30 25       TSH   Date Value Ref Range Status   11/20/2018 3.22 0.40 - 4.00 mU/L Final   10/25/2018 2.38 0.40 - 4.00 mU/L Final   ]    ASSESSMENT/PLAN:  Aftercare following surgery of the musculoskeletal system  Closed  Mildly displaced intertrochanteric fracture of right femur with routine healing, subsequent encounter  Fall, subsequent encounter  Age-related osteoporosis with current pathological fracture with routine healing, subsequent encounter  Mechanical fall at home, Head CT neg  4/2013 DEXA with most neg T score -2.6 at right femur and spine  Subsequent mildly displaced right intertrochanteric femur fracture s/p nailing 11/8/19 by TCODr Goddard  PTA ASA 81 mg daily --> 81 mg BID x 30 days  On PTA Calcium-Vit D3 600 mg-200 units daily (last Ca: 9.3, vitamin D level 71)   WBAT     Analgesia with (new) oxycodone 2.5 mg q4 hours PRN - used x 6 since admission (about daily in afternoon/evening)   PTA Tylenol 650 mg q4 hours PRN changed --> 1000 mg BID and 1000 mg daily PRN     Patient reports  pain with ambulation, otherwise finds pain tolerable.   Exam today  with Aquacel dressing in place without s/s of infection present.      PLAN:   - (changed) ASA 81 mg BID until 12/8/19 --> ASA 81  "mg daily   - WBAT with Physical Therapy, Occupational Therapy for rehab  -  (new) Tylenol 1000 mg BID to reduce use of oxycodone  - (new) Tylenol 1000 mg daily PRN for mild pain  - (change/new) oxycodone 2.5 mg q 4 hours PRN for severe pain   - monitor for s/s of infection present.      Anemia due to blood loss, acute  Hgb 10.4 --> 6.7, s/p 1 unit pRBC --> 9.9 by discharge  No bleeding noted  11/18/19 Hgb not ordered - can re-order for tomorrow.      PLAN:   - monitor for bleeding   - 11/19 Hgb for monitoring.      Hx of Clostridium difficile infection  9/1-9/6 hospitalization with urosepsis c/b C diff infection  On pulsed po vancoycin taper, now 125 mg every other day x 4 weeks (stop 12/6/19)     Currently  patient reports diarrhea each time she voids and reports \"pieces\" but discussion seemingly stool is soft, not liquid. She denies abdominal cramping  SNF EHR showing 2-4 BMs/day.       PLAN:  - PTA po vancomycin 125 mg every other day x 4 weeks (stop 12/6/19)  - likely will need recheck after 12/6/19 for monitoring of presence of persistent infection.      Hypertension goal BP (blood pressure) < 140/90  Hyperlipidemia LDL goal <130  On PTA carvedilol 25 mg BID, losartan 100 mg daily,   PTA amlodipine restarted with parameters to hold - not held since starting      BPs 120-150s/60-70s  HRs 70-80s  Patient denies CP, HA, lightheadedness (may be poor historian)      PLAN:  - continue PTA carvedilol, losartan  - restart ed PTA amlodipine 5 mg q HS but hold for SBP <110   - BP goals are <150/90 mm Hg.This is higher than ACC and AHA recommendations due to risk for hypotension, risk of dizziness and falls, risk of tissue/cerebral hypoperfusion and frailty.      Pulmonary fibrosis (H)  History of TEETEE infection - antibiotics complete  F/b [facility]  Per Care Everywhere: 9/2 CT of the chest without contrast ordered showed scattered subpleural interstitial scarring and fibrosis with no change from previous CT. " Small scattered subpleural pulmonary nodules, not enlarging per radiologist.  Not on O2 at baseline     On PTA ipratropium 0.03% nasal spray 2 sprays BID PRN for wheezing  Sats 95-96% on RA  Patient reports  no SOB, no cough  LS  clear (improved since last visit)      PLAN:  - monitor respiratory status closely   - reduce use of oxycodone as much as possible  - pulm toileting   - continue PRN ipratropium PRN      Alzheimer's dementia without behavioral disturbance, unspecified timing of dementia onset (H)  Mild major depression (H)  [facility] January 2017 - 20/30  Resides with spouse at home  F/b Dr Ordonez, Neurology     On PTA donepezil 10 mg q HS, memantine 10 mg BID, mirtazapine 30 mg q HS, sertraline 100 mg q HS,   A&O x  1 today (knew month after some prompting but not year nor location or details of admission/recent events)      This TCU stay: [facility] 6/30, CPT 4/5.6  PHQ9 - 4  Patient reports she sleeps poorly but no concerns from nursing. Will continue to monitor.      PLAN:  - continue PTA donepezil, memantine, mirtazapine, sertraline  - new on oxycodone for pain so higher risk for delirium (reducing dosing today)  - Occupational Therapy for cognitive exercising  - f/u with Dr Ordonez as planned   - monitor sleep but at this time, allow patient to settle into new environment first.      Impaired mobility and activities of daily living  Physical deconditioning  2/2 advanced age, dementia, falls, hospitalizations with med changes, pain, etc.     Therapy update:  CGA for STS transfers  Ambulating 100 ft with 4WW, CGA  Mod A for LB dressing  SBA for toileting  [facility] 6/30  CPT 4/5.6     PLAN:  - Physical Therapy, Occupational Therapy for strengthening, mobility, rehab, etc.       Total time spent with patient visit at the skilled nursing facility was 25 min including patient visit, review of past records, phone call to patient contact and coordination with nursing and therapy. Greater than 50% of total time spent with  counseling and coordinating care due to review of SNF EHR, patient visit with discussion of pain, insomnia, therapy goals and reason for TCU stay, phone call to pt's  with discusion of current status and goals of therapy before returning home..  Electronically signed by:  TUNG Wong CNP               Sincerely,        TUNG Wong CNP

## 2019-11-22 ENCOUNTER — NURSING HOME VISIT (OUTPATIENT)
Dept: GERIATRICS | Facility: CLINIC | Age: 84
End: 2019-11-22
Payer: MEDICARE

## 2019-11-22 VITALS
OXYGEN SATURATION: 94 % | SYSTOLIC BLOOD PRESSURE: 168 MMHG | HEART RATE: 104 BPM | DIASTOLIC BLOOD PRESSURE: 77 MMHG | RESPIRATION RATE: 16 BRPM

## 2019-11-22 DIAGNOSIS — Z86.19 HX OF CLOSTRIDIUM DIFFICILE INFECTION: ICD-10-CM

## 2019-11-22 DIAGNOSIS — F32.0 MILD MAJOR DEPRESSION (H): ICD-10-CM

## 2019-11-22 DIAGNOSIS — D62 ANEMIA DUE TO BLOOD LOSS, ACUTE: ICD-10-CM

## 2019-11-22 DIAGNOSIS — F03.90 DEMENTIA WITHOUT BEHAVIORAL DISTURBANCE, UNSPECIFIED DEMENTIA TYPE: ICD-10-CM

## 2019-11-22 DIAGNOSIS — S72.141D CLOSED DISPLACED INTERTROCHANTERIC FRACTURE OF RIGHT FEMUR WITH ROUTINE HEALING, SUBSEQUENT ENCOUNTER: Primary | ICD-10-CM

## 2019-11-22 DIAGNOSIS — M80.00XD AGE-RELATED OSTEOPOROSIS WITH CURRENT PATHOLOGICAL FRACTURE WITH ROUTINE HEALING, SUBSEQUENT ENCOUNTER: ICD-10-CM

## 2019-11-22 DIAGNOSIS — I10 BENIGN ESSENTIAL HYPERTENSION: ICD-10-CM

## 2019-11-22 PROCEDURE — 99305 1ST NF CARE MODERATE MDM 35: CPT | Performed by: INTERNAL MEDICINE

## 2019-11-22 NOTE — PROGRESS NOTES
"[facility]  PHYSICIAN NOTE    PRIMARY CARE PROVIDER AND CLINIC:  MERON EASON PA-C, [address] [facility] [address]    Chief Complaint   Patient presents with     Hospital F/U     MD Initial     [facility] Record Number:  [id number]  Place of Service where encounter took place:  [facility]) [[id number]]    Ghada Magana is a 84 year old (1935), admitted to the above facility from  [facility] . Hospital stay 11/7/19 through 11/11/19. Admitted to this facility for  rehab, medical management and nursing care.     HPI:    HPI information obtained from: facility chart records, facility staff, patient report and [facility] chart review.     Brief summary of hospital course: Ghada Magana was admitted after a fall sustaining right hip fracture s/p surgical repair. Per notes she had lost her balance at home resulting in the fall. Did need 1 unit PRBCs for Hgb <7 postop. Of note she also has h/o advanced dementia on Donepezil with recently added Namenda by neurologist Dr. De Anda. She is also on Vanco taper for recurrent C.diff.     Updates on status since skilled nursing admission: Ghada is seen in her room today knitting marco a. She is upset about being here and wants to go home though says \"my  is worse off than I am and needs a cane\". She says she has two living adult daughters and two children have already passed away. She says her hip is better without pain. Nurses say she shows some signs of pain with ambulation but not at rest. She did have a shower today and nurse says bandage looks good with orders to keep in place until ortho f/u appointment. Ghada denies bowel/bladder concerns and isn't aware of her recent troubles with C.diff. Nurses say she is having about 2 stools per day but not yet this AM. They do note her memory loss but no behavioral concerns.  Her BPs have been a bit variable while here. She has card and flowers in her " "room but isn't able to tell me who they are from - \"one of those guys\". Ghada didn't c/o insomnia to me though has to primary NP. Nurses say they haven't gotten report from overnight staff about insomnia so its not likely an issue.    CODE STATUS/ADVANCE DIRECTIVES DISCUSSION:   DNR / DNI  Patient's living condition: lives with spouse    ALLERGIES: Ace inhibitors and Hctz    Past Medical History:   Diagnosis Date     C. 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     Unspecified essential hypertension       Past Surgical History:   Procedure Laterality Date     Right hip repair Right 11/2019    R hip fracture s/p repair [facility]     SURGICAL HISTORY OF -       s/p bronchoscopy      Family History   Problem Relation Age of Onset     Cancer Daughter         ovarian cancer     Cerebrovascular Disease Father      Hypertension Father      Social History     Tobacco Use     Smoking status: Never Smoker     Smokeless tobacco: Never Used   Substance Use Topics     Alcohol use: Yes     Comment: occasionally     Drug use: No        Post-discharge medication reconciliation status: Reviewed in [facility]    Current Outpatient Medications   Medication Sig Dispense Refill     acetaminophen (TYLENOL) 500 MG tablet Take 2 tablets (1,000 mg) by mouth 2 times daily. May also take 2 tablets (1,000 mg) daily as needed for mild pain.       amLODIPine (NORVASC) 5 MG tablet Take 1 tablet (5 mg) by mouth daily Hold for SBP <110 90 tablet 0     aspirin 81 MG EC tablet Take 81 mg by mouth 2 times daily For 30 days for DVT prophy       calcium carbonate-vitamin D (OS-ARMANDO) 600-400 MG-UNIT chewable tablet Take 1 chew tab by mouth daily       carvedilol (COREG) 25 MG tablet TAKE 1 TABLET BY MOUTH TWICE DAILY WITH MEALS 180 tablet 0     " donepezil (ARICEPT) 10 MG tablet Take 1 tablet (10 mg) by mouth At Bedtime 90 tablet 1     ipratropium (ATROVENT) 0.03 % nasal spray Spray 2 sprays into both nostrils 2 times daily as needed for rhinitis       losartan (COZAAR) 100 MG tablet Take 1 tablet by mouth once daily.  90 tablet 0     memantine (NAMENDA) 10 MG tablet Take 10 mg by mouth 2 times daily       mirtazapine (REMERON) 30 MG tablet TAKE ONE TABLET BY MOUTH EVERY NIGHT AT BEDTIME 90 tablet 0     OXYCODONE HCL PO Take 2.5 mg by mouth every 4 hours as needed       sertraline (ZOLOFT) 100 MG tablet TAKE 1 TABLET BY MOUTH ONCE DAILY 90 tablet 0     thiamine mononitrate 100 MG TABS Take 100 mg by mouth daily 30 tablet 11     timolol maleate (TIMOPTIC) 0.5 % ophthalmic solution Place 1 drop into both eyes 2 times daily       vancomycin (VANCOCIN) 125 MG capsule Pulsed-tapered regimen: 125 mg 4 times daily for 14 days, then 125 mg twice daily for 7 days, then 125 mg once daily for 7 days, the every other day for 4 weeks 91 capsule 0       ROS:  Limited secondary to cognitive impairment but today pt reports as above in HPI    Exam:  BP (!) 168/77   Pulse 104   Resp 16   SpO2 94%   Alert, pleasant, NAD, sitting up in recliner knitting mittens  Slightly sarcastic, vague historian  No scleral icterus  Moist oral mucosa  HRRR without mrg  No edema  Lungs with slight bibasilar inspiratory crackles, no cough, breathing non-labored on room air  Abdomen soft, NT  Able to wiggle toes easily    Lab/Diagnostic data:  Negative quantiferon gold    Hgb per CareEverywhere was down to 6.7 during hospital stay but up to 9.9 at discharge after transfusion    ASSESSMENT/PLAN:  Closed displaced intertrochanteric fracture of right femur with routine healing, subsequent encounter  Age-related osteoporosis  Anemia due to blood loss, acute  Continue physical therapy and occupational therapy for safe dispo planning  F/u Hgb reasonable prior to discharge  Is to be on ASA 81 mg BID  for a month for DVT prophy per ortho  F/u with ortho as directed  F/u with PCP on osteoporosis management; she is on Ca/D    Hx of Clostridium difficile infection  Nursing staff report stool frequency reducing  Is continuing on her Vanco taper and is currently on Vancomycin 125 mg every other day until 12/6/19    Dementia without behavioral disturbance   Mild major depression  Follows with neurologist Dr. De Anda with [facility] 8/30 in Sept 2019 and CPT 4.0/5.6 on 02/26/2019. On Donepezil with recently added Namenda.  Also takes Sertraline and Remeron for depression.  Of note, Donepezil and Sertraline can cause diarrhea too so if stools don't improve consider asking  about her bowel frequency prior to diagnosis of C.diff and correlate with initiation of her meds. Namenda can cause diarrhea in approximately 5% of folks.    Benign essential hypertension  BPs have been a bit variable but overall reasonable on home regimen; imagine pain and stress from being away from home in setting of dementia contributes to some occasional higher readings so I wouldn't adjust her regimen at this time.      Electronically signed by:  Evie Hein DO

## 2019-11-22 NOTE — LETTER
"    11/22/2019        RE: Ghada Magana  [address]  [facility] [address]        [place] [facility]  PHYSICIAN NOTE    PRIMARY CARE PROVIDER AND CLINIC:  MERON EASON PA-C, [address] [facility] / [facility] [address]    Chief Complaint   Patient presents with     Hospital F/U     MD Initial     [place] Medical Record Number:  [id number]  Place of Service where encounter took place[facility] [[id number]]    Ghada Magana is a 84 year old (1935), admitted to the above facility from  [facility] . Hospital stay 11/7/19 through 11/11/19. Admitted to this facility for  rehab, medical management and nursing care.     HPI:    HPI information obtained from: facility chart records, facility staff, patient report and [facility] chart review.     Brief summary of hospital course: Ghada Magana was admitted after a fall sustaining right hip fracture s/p surgical repair. Per notes she had lost her balance at home resulting in the fall. Did need 1 unit PRBCs for Hgb <7 postop. Of note she also has h/o advanced dementia on Donepezil with recently added Namenda by neurologist Dr. De Anda. She is also on Vanco taper for recurrent C.diff.     Updates on status since skilled nursing admission: Ghada is seen in her room today knitting marco a. She is upset about being here and wants to go home though says \"my  is worse off than I am and needs a cane\". She says she has two living adult daughters and two children have already passed away. She says her hip is better without pain. Nurses say she shows some signs of pain with ambulation but not at rest. She did have a shower today and nurse says bandage looks good with orders to keep in place until ortho f/u appointment. Ghada denies bowel/bladder concerns and isn't aware of her recent troubles with C.diff. Nurses say she is having about 2 stools per day but not yet this AM. They do note her memory loss but no " "behavioral concerns.  Her BPs have been a bit variable while here. She has card and flowers in her room but isn't able to tell me who they are from - \"one of those guys\". Ghada didn't c/o insomnia to me though has to primary NP. Nurses say they haven't gotten report from overnight staff about insomnia so its not likely an issue.    CODE STATUS/ADVANCE DIRECTIVES DISCUSSION:   DNR / DNI  Patient's living condition: lives with spouse    ALLERGIES: Ace inhibitors and Hctz    Past Medical History:   Diagnosis Date     C. 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     Unspecified essential hypertension       Past Surgical History:   Procedure Laterality Date     Right hip repair Right 11/2019    R hip fracture s/p repair [facility]     SURGICAL HISTORY OF -       s/p bronchoscopy      Family History   Problem Relation Age of Onset     Cancer Daughter         ovarian cancer     Cerebrovascular Disease Father      Hypertension Father      Social History     Tobacco Use     Smoking status: Never Smoker     Smokeless tobacco: Never Used   Substance Use Topics     Alcohol use: Yes     Comment: occasionally     Drug use: No        Post-discharge medication reconciliation status: Reviewed in [facility]    Current Outpatient Medications   Medication Sig Dispense Refill     acetaminophen (TYLENOL) 500 MG tablet Take 2 tablets (1,000 mg) by mouth 2 times daily. May also take 2 tablets (1,000 mg) daily as needed for mild pain.       amLODIPine (NORVASC) 5 MG tablet Take 1 tablet (5 mg) by mouth daily Hold for SBP <110 90 tablet 0     aspirin 81 MG EC tablet Take 81 mg by mouth 2 times daily For 30 days for DVT prophy       calcium carbonate-vitamin D (OS-ARMANDO) 600-400 MG-UNIT chewable tablet Take 1 chew tab by mouth daily       " carvedilol (COREG) 25 MG tablet TAKE 1 TABLET BY MOUTH TWICE DAILY WITH MEALS 180 tablet 0     donepezil (ARICEPT) 10 MG tablet Take 1 tablet (10 mg) by mouth At Bedtime 90 tablet 1     ipratropium (ATROVENT) 0.03 % nasal spray Spray 2 sprays into both nostrils 2 times daily as needed for rhinitis       losartan (COZAAR) 100 MG tablet Take 1 tablet by mouth once daily.  90 tablet 0     memantine (NAMENDA) 10 MG tablet Take 10 mg by mouth 2 times daily       mirtazapine (REMERON) 30 MG tablet TAKE ONE TABLET BY MOUTH EVERY NIGHT AT BEDTIME 90 tablet 0     OXYCODONE HCL PO Take 2.5 mg by mouth every 4 hours as needed       sertraline (ZOLOFT) 100 MG tablet TAKE 1 TABLET BY MOUTH ONCE DAILY 90 tablet 0     thiamine mononitrate 100 MG TABS Take 100 mg by mouth daily 30 tablet 11     timolol maleate (TIMOPTIC) 0.5 % ophthalmic solution Place 1 drop into both eyes 2 times daily       vancomycin (VANCOCIN) 125 MG capsule Pulsed-tapered regimen: 125 mg 4 times daily for 14 days, then 125 mg twice daily for 7 days, then 125 mg once daily for 7 days, the every other day for 4 weeks 91 capsule 0       ROS:  Limited secondary to cognitive impairment but today pt reports as above in HPI    Exam:  BP (!) 168/77   Pulse 104   Resp 16   SpO2 94%   Alert, pleasant, NAD, sitting up in recliner knitting mittens  Slightly sarcastic, vague historian  No scleral icterus  Moist oral mucosa  HRRR without mrg  No edema  Lungs with slight bibasilar inspiratory crackles, no cough, breathing non-labored on room air  Abdomen soft, NT  Able to wiggle toes easily    Lab/Diagnostic data:  Negative quantiferon gold    Hgb per CareEverywhere was down to 6.7 during hospital stay but up to 9.9 at discharge after transfusion    ASSESSMENT/PLAN:  Closed displaced intertrochanteric fracture of right femur with routine healing, subsequent encounter  Age-related osteoporosis  Anemia due to blood loss, acute  Continue physical therapy and occupational  therapy for safe dispo planning  F/u Hgb reasonable prior to discharge  Is to be on ASA 81 mg BID for a month for DVT prophy per ortho  F/u with ortho as directed  F/u with PCP on osteoporosis management; she is on Ca/D    Hx of Clostridium difficile infection  Nursing staff report stool frequency reducing  Is continuing on her Vanco taper and is currently on Vancomycin 125 mg every other day until 12/6/19    Dementia without behavioral disturbance   Mild major depression  Follows with neurologist Dr. De Anda with [facility] 8/30 in Sept 2019 and CPT 4.0/5.6 on 02/26/2019. On Donepezil with recently added Namenda.  Also takes Sertraline and Remeron for depression.  Of note, Donepezil and Sertraline can cause diarrhea too so if stools don't improve consider asking  about her bowel frequency prior to diagnosis of C.diff and correlate with initiation of her meds. Namenda can cause diarrhea in approximately 5% of folks.    Benign essential hypertension  BPs have been a bit variable but overall reasonable on home regimen; imagine pain and stress from being away from home in setting of dementia contributes to some occasional higher readings so I wouldn't adjust her regimen at this time.      Electronically signed by:  Evie Hein, DO        Sincerely,        Evie Hein, DO

## 2019-11-23 PROBLEM — F03.90 DEMENTIA (H): Status: ACTIVE | Noted: 2018-11-15

## 2019-11-23 PROBLEM — G31.84 MCI (MILD COGNITIVE IMPAIRMENT) WITH MEMORY LOSS: Status: RESOLVED | Noted: 2017-10-27 | Resolved: 2019-11-23

## 2019-11-23 RX ORDER — ASPIRIN 81 MG/1
81 TABLET ORAL 2 TIMES DAILY
COMMUNITY
End: 2019-12-20

## 2019-11-23 RX ORDER — MEMANTINE HYDROCHLORIDE 10 MG/1
10 TABLET ORAL 2 TIMES DAILY
COMMUNITY
End: 2019-12-20

## 2019-11-23 RX ORDER — TIMOLOL MALEATE 5 MG/ML
1 SOLUTION/ DROPS OPHTHALMIC 2 TIMES DAILY
COMMUNITY

## 2019-11-27 ENCOUNTER — TRANSFERRED RECORDS (OUTPATIENT)
Dept: HEALTH INFORMATION MANAGEMENT | Facility: CLINIC | Age: 84
End: 2019-11-27

## 2019-11-29 NOTE — PROGRESS NOTES
[place] [facility] DISCHARGE SUMMARY  PATIENT'S NAME: Ghada Magana  YOB: 1935  MEDICAL RECORD NUMBER:  [id number]  Place of Service where encounter took place:  [facility] [[id number]]    PRIMARY CARE PROVIDER AND CLINIC RESPONSIBLE AFTER TRANSFER:   MERON EASON PA-C, [address] [facility] [address]    [facility]     Transferring providers: TUNG Wong CNP, Evie Hein MD  Recent Hospitalization/ED:  [facility]  stay 11/7/19 to 11/11/19.  Date of SNF Admission: November / 11 / 2019  Date of SNF (anticipated) Discharge: December / 04 / 2019  Discharged to: previous independent home and with family spouse  Cognitive Scores: SLUMS: 6/30, CPT: 4/5.6 and Safety Questionnaire: 11/20  Physical Function: [place] Balance Scale: 45/56 and TUG 27 sec  DME: Walker    CODE STATUS/ADVANCE DIRECTIVES DISCUSSION:  DNR / DNI   ALLERGIES: Ace inhibitors and Hctz     Per Epic note/Hx:  Patient is an 84 year old woman with PMH of subpleural pulmonary fibrosis with history of TEETEE infection (completed antibiotic course) f/b [facility], Alzheimer's Dementia, Mild Major Depression, HTN, HLD, hypothyroidism, osteoporosis who recently had been hospitalized 9/1-9/6/19 for Urosepsis with C diff infection, now presenting after mechanical fall at home with subsequent right intertrochanteric femur fracture s/p nailing on 11/8/19.  Hospitalization was c/b ABL anemia (10.4 --> 6.7 s/p 1 unit pRBC --> 9.9 by discharge) and some hypotension for which amlodipine was held.  She continues on her pulsed taper of po Vancomycin for C diff infection.  She was transferred to TCU for therapy, nursing cares and medical management.    DISCHARGE DIAGNOSIS/NURSING FACILITY COURSE:   Aftercare following surgery of the musculoskeletal system  Closed  Mildly displaced intertrochanteric fracture of right femur with routine healing, subsequent encounter  Fall, subsequent  encounter  Age-related osteoporosis with current pathological fracture with routine healing, subsequent encounter  Mechanical fall at home, Head CT neg  4/2013 DEXA with most neg T score -2.6 at right femur and spine  Subsequent mildly displaced right intertrochanteric femur fracture s/p nailing 11/8/19 by Dr Rupesh SANTOS  PTA ASA 81 mg daily --> 81 mg BID x 30 days  On PTA Calcium-Vit D3 600 mg-200 units daily (last Ca: 9.3, vitamin D level 71)   WBAT     Analgesia with (new) oxycodone 2.5 mg q4 hours PRN - last used 11/18/19 - can DC  PTA Tylenol 650 mg q4 hours PRN changed --> 1000 mg BID and 1000 mg daily PRN      Patient reports tolerable right hip pain today  Exam today with incision healed, FABBY     PLAN:   - (changed) ASA 81 mg BID until 12/8/19 --> ASA 81 mg daily   - WBAT with Home Care Physical Therapy, Occupational Therapy for continued rehab  - (new) Tylenol 1000 mg BID   - (new) Tylenol 1000 mg daily PRN for mild pain  - discontinue PRN oxycodone      Anemia due to blood loss, acute  Hgb 10.4 --> 6.7, s/p 1 unit pRBC --> 9.9 by discharge  No bleeding noted  11/19/19 Hgb 10.3      PLAN:   - recommend recheck Hgb for stability      Hx of Clostridium difficile infection  9/1-9/6 hospitalization with urosepsis c/b C diff infection  On pulsed po vancoycin taper, now 125 mg every other day x 4 weeks (stop 12/6/19)     Patient is also on donepezil, sertraline and Namenda - all which may contribute to diarrhea.   Nursing reporting soft, formed stool at this time.  Patient has been afebrile, denies abdominal cramping.        PLAN:   - PTA po vancomycin 125 mg every other day x 4 weeks (stop 12/6/19)     Hypertension goal BP (blood pressure) < 140/90  Hyperlipidemia LDL goal <130  On PTA carvedilol 25 mg BID, losartan 100 mg daily,   PTA amlodipine restarted with parameters to hold - not held since starting      BPs quite variable! Some SBP 90s, then 150s.   HRs 70-90s mostly   Patient denies CP, HA,  lightheadedness (may be poor historian)   Orthostatics done today and showing 30 point drop from lying to standing      PLAN:   - continue PTA carvedilol, losartan  -discontinue PTA amlodipine (was stopped in the hosp, restarted in TCU, now stop again)    - BP goals are <150/90 mm Hg.This is higher than ACC and AHA recommendations due to risk for hypotension, risk of dizziness and falls, risk of tissue/cerebral hypoperfusion and frailty.      Pulmonary fibrosis (H)  History of TEETEE infection - antibiotics complete  F/b [facility]  Per Care Everywhere: 9/2 CT of the chest without contrast ordered showed scattered subpleural interstitial scarring and fibrosis with no change from previous CT. Small scattered subpleural pulmonary nodules, not enlarging per radiologist.  Not on O2 at baseline     On PTA ipratropium 0.03% nasal spray 2 sprays BID PRN for wheezing  Sats 93-96 on RA   Patient reports no SOB (may be poor hsitorian)  LS clear      PLAN:   - continue PRN ipratropium PRN      Alzheimer's dementia without behavioral disturbance, unspecified timing of dementia onset (H)  Mild major depression (H)  UMS January 2017 - 20/30  Resides with spouse at home  F/b Dr Ordonez, Neurology     On PTA donepezil 10 mg q HS, memantine 10 mg BID, mirtazapine 30 mg q HS, sertraline 100 mg q HS,      This TCU stay: [facility] 6/30, CPT 4/5.6, safety questionnaire 11/20,   PHQ9 - 4      PLAN:   - continue PTA donepezil, memantine, mirtazapine, sertraline  - discontinue oxycodone  - f/u with Dr Ordonez as planned      Impaired mobility and activities of daily living  Physical deconditioning  2/2 advanced age, dementia, falls, hospitalizations with med changes, pain, etc.      Therapy update:  SBA for STS transfers  Ambulating 150 ft with 4WW, SBA  Supervision for ADLs  [facility] 6/30  CPT 4/5.6  Safety questionnaire 11/20  TUG 27 sec  HOLT 45/56     PLAN:  - [facility], Occupational Therapy for continued  strengthening, mobility, rehab, etc.     Past Medical History:  has a past medical history of C. 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    Discharge Medications:  Current Outpatient Medications   Medication Sig Dispense Refill     acetaminophen (TYLENOL) 500 MG tablet Take 2 tablets (1,000 mg) by mouth 2 times daily. May also take 2 tablets (1,000 mg) daily as needed for mild pain.       aspirin 81 MG EC tablet Take 81 mg by mouth 2 times daily For 30 days for DVT prophy       calcium carbonate-vitamin D (OS-ARMANDO) 600-400 MG-UNIT chewable tablet Take 1 chew tab by mouth daily       carvedilol (COREG) 25 MG tablet TAKE 1 TABLET BY MOUTH TWICE DAILY WITH MEALS 180 tablet 0     donepezil (ARICEPT) 10 MG tablet Take 1 tablet (10 mg) by mouth At Bedtime 90 tablet 1     ipratropium (ATROVENT) 0.03 % nasal spray Spray 2 sprays into both nostrils 2 times daily as needed for rhinitis       losartan (COZAAR) 100 MG tablet Take 1 tablet by mouth once daily.  90 tablet 0     memantine (NAMENDA) 10 MG tablet Take 10 mg by mouth 2 times daily       mirtazapine (REMERON) 30 MG tablet TAKE ONE TABLET BY MOUTH EVERY NIGHT AT BEDTIME 90 tablet 0     sertraline (ZOLOFT) 100 MG tablet TAKE 1 TABLET BY MOUTH ONCE DAILY 90 tablet 0     thiamine mononitrate 100 MG TABS Take 100 mg by mouth daily 30 tablet 11     timolol maleate (TIMOPTIC) 0.5 % ophthalmic solution Place 1 drop into both eyes 2 times daily       vancomycin (VANCOCIN) 125 MG capsule Pulsed-tapered regimen: 125 mg 4 times daily for 14 days, then 125 mg twice daily for 7 days, then 125 mg once daily for 7 days, the every other day for 4 weeks 91 capsule 0     Medication Changes/Rationale:   Hosp: discontinue amlodipine, ASA from daily --> 81 mg BID, (new) oxycodone for pain  11/12/19: restart amlodipine  "5 mg q HS. Hold for SBP <110, Change oxycodone to 2.5 vmg q 4 hours PRN only, Tylenol 1000 mg BID, Tylenol 1000 mg daily PRN    Controlled medications sent with patient:   not applicable/none     ROS:   Limited secondary to cognitive impairment but today pt reports 10 point ROS of systems including Constitutional, Eyes, Respiratory, Cardiovascular, Gastroenterology, Genitourinary, Integumentary, Musculoskeletal, Psychiatric were all negative except for pertinent positives noted in my HPI.    Physical Exam:   Vitals: BP 93/59   Pulse 73   Temp 98  F (36.7  C)   Resp 18   Ht 1.676 m (5' 6\")   Wt 47.8 kg (105 lb 6.4 oz)   SpO2 94%   BMI 17.01 kg/m    BMI= Body mass index is 17.01 kg/m .  GENERAL APPEARANCE:  Alert, in no distress, frail, pleasantly confused at baseline  RESP:  respiratory effort and palpation of chest normal, auscultation of lungs clear, no respiratory distress  CV:  Palpation and auscultation of heart done , rate and rhythm regular, no murmur, no LE peripheral edema  ABDOMEN:  normal bowel sounds, soft, nontender, no hepatosplenomegaly or other masses  M/S:   Gait and station ambulatory with walker, Digits and nails with arthritic changes, significantly reduced muscle mass  SKIN:  Inspection and Palpation of skin and subcutaneous tissue pale, incision healed and FABBY  PSYCH:  insight and judgement, memory with impairment affect and mood normal, follows commands readily         SNF labs: Labs done while at Skilled Nursing Facility done by [place] lab. Pertinent results are: in narrative    DISCHARGE PLAN:    Follow up labs: Hgb recheck when meeting with PCP    Medical Follow Up:      Follow up with primary care provider in 1-2 weeks    MTM referral needed and placed by this provider: No    Current [place] scheduled appointments: none at this time     Discharge Services: Home Care:  Occupational Therapy, Physical Therapy, Registered Nurse and Home Health Aide    Discharge Instructions " Verbalized to Patient at Discharge:     Weight bearing restrictions:  Weight bearing as tolerated.       TOTAL DISCHARGE TIME:   Greater than 30 minutes  Electronically signed by:  TUNG Wong CNP

## 2019-12-02 ENCOUNTER — DISCHARGE SUMMARY NURSING HOME (OUTPATIENT)
Dept: GERIATRICS | Facility: CLINIC | Age: 84
End: 2019-12-02
Payer: MEDICARE

## 2019-12-02 VITALS
DIASTOLIC BLOOD PRESSURE: 59 MMHG | WEIGHT: 105.4 LBS | TEMPERATURE: 98 F | RESPIRATION RATE: 18 BRPM | OXYGEN SATURATION: 94 % | SYSTOLIC BLOOD PRESSURE: 93 MMHG | HEIGHT: 66 IN | BODY MASS INDEX: 16.94 KG/M2 | HEART RATE: 73 BPM

## 2019-12-02 DIAGNOSIS — D62 ANEMIA DUE TO BLOOD LOSS, ACUTE: ICD-10-CM

## 2019-12-02 DIAGNOSIS — G30.9 ALZHEIMER'S DEMENTIA WITHOUT BEHAVIORAL DISTURBANCE, UNSPECIFIED TIMING OF DEMENTIA ONSET: ICD-10-CM

## 2019-12-02 DIAGNOSIS — S72.141D CLOSED DISPLACED INTERTROCHANTERIC FRACTURE OF RIGHT FEMUR WITH ROUTINE HEALING, SUBSEQUENT ENCOUNTER: ICD-10-CM

## 2019-12-02 DIAGNOSIS — Z74.09 IMPAIRED MOBILITY AND ACTIVITIES OF DAILY LIVING: ICD-10-CM

## 2019-12-02 DIAGNOSIS — M80.00XD AGE-RELATED OSTEOPOROSIS WITH CURRENT PATHOLOGICAL FRACTURE WITH ROUTINE HEALING, SUBSEQUENT ENCOUNTER: ICD-10-CM

## 2019-12-02 DIAGNOSIS — I10 BENIGN ESSENTIAL HYPERTENSION: ICD-10-CM

## 2019-12-02 DIAGNOSIS — F32.0 MILD MAJOR DEPRESSION (H): ICD-10-CM

## 2019-12-02 DIAGNOSIS — J84.10 PULMONARY FIBROSIS (H): ICD-10-CM
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
## 2019-12-02 DIAGNOSIS — Z47.89 AFTERCARE FOLLOWING SURGERY OF THE MUSCULOSKELETAL SYSTEM: Primary | ICD-10-CM

## 2019-12-02 DIAGNOSIS — Z86.19 HISTORY OF MAI INFECTION: ICD-10-CM

## 2019-12-02 DIAGNOSIS — Z86.19 HX OF CLOSTRIDIUM DIFFICILE INFECTION: ICD-10-CM

## 2019-12-02 DIAGNOSIS — Z78.9 IMPAIRED MOBILITY AND ACTIVITIES OF DAILY LIVING: ICD-10-CM

## 2019-12-02 DIAGNOSIS — E78.5 HYPERLIPIDEMIA LDL GOAL <130: ICD-10-CM

## 2019-12-02 DIAGNOSIS — F02.80 ALZHEIMER'S DEMENTIA WITHOUT BEHAVIORAL DISTURBANCE, UNSPECIFIED TIMING OF DEMENTIA ONSET: ICD-10-CM

## 2019-12-02 DIAGNOSIS — R53.81 PHYSICAL DECONDITIONING: ICD-10-CM

## 2019-12-02 PROCEDURE — 99316 NF DSCHRG MGMT 30 MIN+: CPT | Performed by: NURSE PRACTITIONER

## 2019-12-02 ASSESSMENT — MIFFLIN-ST. JEOR: SCORE: 944.84

## 2019-12-02 NOTE — LETTER
12/2/2019        RE: Ghada Magana  [address]  [facility] [address]        [facility] DISCHARGE SUMMARY  PATIENT'S NAME: Ghada Magana  YOB: 1935  MEDICAL RECORD NUMBER:  [id number]  Place of Service where encounter took place:  [facility] (SNF) [[id number]]    PRIMARY CARE PROVIDER AND CLINIC RESPONSIBLE AFTER TRANSFER:   MERON EASON PA-C, [address] [facility] [address]    [facility] [place] [place] Provider     Transferring providers: TUNG Wong CNP, Evie Hein MD  Recent Hospitalization/ED:  [facility]  stay 11/7/19 to 11/11/19.  Date of SNF Admission: November / 11 / 2019  Date of SNF (anticipated) Discharge: December / 04 / 2019  Discharged to: previous independent home and with family spouse  Cognitive Scores: SLUMS: 6/30, CPT: 4/5.6 and Safety Questionnaire: 11/20  Physical Function: Alvarado Balance Scale: 45/56 and TUG 27 sec  DME: Walker    CODE STATUS/ADVANCE DIRECTIVES DISCUSSION:  DNR / DNI   ALLERGIES: Ace inhibitors and Hctz     Per Epic note/Hx:  Patient is an 84 year old woman with PMH of subpleural pulmonary fibrosis with history of TEETEE infection (completed antibiotic course) f/b [facility], Alzheimer's Dementia, Mild Major Depression, HTN, HLD, hypothyroidism, osteoporosis who recently had been hospitalized 9/1-9/6/19 for Urosepsis with C diff infection, now presenting after mechanical fall at home with subsequent right intertrochanteric femur fracture s/p nailing on 11/8/19.  Hospitalization was c/b ABL anemia (10.4 --> 6.7 s/p 1 unit pRBC --> 9.9 by discharge) and some hypotension for which amlodipine was held.  She continues on her pulsed taper of po Vancomycin for C diff infection.  She was transferred to TCU for therapy, nursing cares and medical management.    DISCHARGE DIAGNOSIS/NURSING FACILITY COURSE:   Aftercare following surgery of the musculoskeletal system  Closed  Mildly  displaced intertrochanteric fracture of right femur with routine healing, subsequent encounter  Fall, subsequent encounter  Age-related osteoporosis with current pathological fracture with routine healing, subsequent encounter  Mechanical fall at home, Head CT neg  4/2013 DEXA with most neg T score -2.6 at right femur and spine  Subsequent mildly displaced right intertrochanteric femur fracture s/p nailing 11/8/19 by Dr Rupesh SANTOS  PTA ASA 81 mg daily --> 81 mg BID x 30 days  On PTA Calcium-Vit D3 600 mg-200 units daily (last Ca: 9.3, vitamin D level 71)   WBAT     Analgesia with (new) oxycodone 2.5 mg q4 hours PRN -  last used 11/18/19 - can DC  PTA Tylenol 650 mg q4 hours PRN changed --> 1000 mg BID and 1000 mg daily PRN      Patient reports tolerable right hip pain today  Exam today with incision healed, FABBY     PLAN:   - (changed) ASA 81 mg BID until 12/8/19 --> ASA 81 mg daily   - WBAT with Home Care Physical Therapy, Occupational Therapy for continued rehab  - (new) Tylenol 1000 mg BID   - (new) Tylenol 1000 mg daily PRN for mild pain  - discontinue PRN oxycodone      Anemia due to blood loss, acute  Hgb 10.4 --> 6.7, s/p 1 unit pRBC --> 9.9 by discharge  No bleeding noted  11/19/19 Hgb 10.3      PLAN:   - recommend recheck Hgb for stability      Hx of Clostridium difficile infection  9/1-9/6 hospitalization with urosepsis c/b C diff infection  On pulsed po vancoycin taper, now 125 mg every other day x 4 weeks (stop 12/6/19)     Patient is also on donepezil, sertraline and Namenda - all which may contribute to diarrhea.   Nursing reporting soft, formed stool at this time.  Patient has been afebrile, denies abdominal cramping.        PLAN:   - PTA po vancomycin 125 mg every other day x 4 weeks (stop 12/6/19)     Hypertension goal BP (blood pressure) < 140/90  Hyperlipidemia LDL goal <130  On PTA carvedilol 25 mg BID, losartan 100 mg daily,   PTA amlodipine restarted with parameters to hold - not held since  starting      BPs quite variable! Some SBP 90s, then 150s.   HRs  70-90s mostly   Patient denies CP, HA, lightheadedness (may be poor historian)   Orthostatics done today and showing 30 point drop from lying to standing      PLAN:   - continue PTA carvedilol, losartan  -discontinue PTA amlodipine (was stopped in the hosp, restarted in TCU, now stop again)    - BP goals are <150/90 mm Hg.This is higher than ACC and AHA recommendations due to risk for hypotension, risk of dizziness and falls, risk of tissue/cerebral hypoperfusion and frailty.      Pulmonary fibrosis (H)  History of TEETEE infection - antibiotics complete  F/b [facility]  Per Care Everywhere: 9/2 CT of the chest without contrast ordered showed scattered subpleural interstitial scarring and fibrosis with no change from previous CT. Small scattered subpleural pulmonary nodules, not enlarging per radiologist.  Not on O2 at baseline     On PTA ipratropium 0.03% nasal spray 2 sprays BID PRN for wheezing  Sats  93-96 on RA   Patient reports no SOB (may be poor hsitorian)  LS clear      PLAN:   - continue PRN ipratropium PRN      Alzheimer's dementia without behavioral disturbance, unspecified timing of dementia onset (H)  Mild major depression (H)  [facility] January 2017 - 20/30  Resides with spouse at home  F/b Dr Ordonez, Neurology     On PTA donepezil 10 mg q HS, memantine 10 mg BID, mirtazapine 30 mg q HS, sertraline 100 mg q HS,      This TCU stay: [facility] 6/30, CPT 4/5.6, safety questionnaire 11/20,   PHQ9 - 4      PLAN:   - continue PTA donepezil, memantine, mirtazapine, sertraline  - discontinue oxycodone  - f/u with Dr Ordonez as planned      Impaired mobility and activities of daily living  Physical deconditioning  2/2 advanced age, dementia, falls, hospitalizations with med changes, pain, etc.      Therapy update:  SBA for STS transfers  Ambulating 150 ft with 4WW, SBA  Supervision for ADLs  [facility] 6/30  CPT 4/5.6  Safety questionnaire 11/20  TUG 27  sec  HOLT 45/56     PLAN:  - [facility], Occupational Therapy for continued strengthening, mobility, rehab, etc.     Past Medical History:  has a past medical history of C. 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    Discharge Medications:  Current Outpatient Medications   Medication Sig Dispense Refill     acetaminophen (TYLENOL) 500 MG tablet Take 2 tablets (1,000 mg) by mouth 2 times daily. May also take 2 tablets (1,000 mg) daily as needed for mild pain.       aspirin 81 MG EC tablet Take 81 mg by mouth 2 times daily For 30 days for DVT prophy       calcium carbonate-vitamin D (OS-ARMANDO) 600-400 MG-UNIT chewable tablet Take 1 chew tab by mouth daily       carvedilol (COREG) 25 MG tablet TAKE 1 TABLET BY MOUTH TWICE DAILY WITH MEALS 180 tablet 0     donepezil (ARICEPT) 10 MG tablet Take 1 tablet (10 mg) by mouth At Bedtime 90 tablet 1     ipratropium (ATROVENT) 0.03 % nasal spray Spray 2 sprays into both nostrils 2 times daily as needed for rhinitis       losartan (COZAAR) 100 MG tablet Take 1 tablet by mouth once daily.  90 tablet 0     memantine (NAMENDA) 10 MG tablet Take 10 mg by mouth 2 times daily       mirtazapine (REMERON) 30 MG tablet TAKE ONE TABLET BY MOUTH EVERY NIGHT AT BEDTIME 90 tablet 0     sertraline (ZOLOFT) 100 MG tablet TAKE 1 TABLET BY MOUTH ONCE DAILY 90 tablet 0     thiamine mononitrate 100 MG TABS Take 100 mg by mouth daily 30 tablet 11     timolol maleate (TIMOPTIC) 0.5 % ophthalmic solution Place 1 drop into both eyes 2 times daily       vancomycin (VANCOCIN) 125 MG capsule Pulsed-tapered regimen: 125 mg 4 times daily for 14 days, then 125 mg twice daily for 7 days, then 125 mg once daily for 7 days, the every other day for 4 weeks 91 capsule 0     Medication Changes/Rationale:   Hosp: discontinue  "amlodipine, ASA from daily --> 81 mg BID, (new) oxycodone for pain  11/12/19: restart amlodipine 5 mg q HS. Hold for SBP <110, Change oxycodone to 2.5 vmg q 4 hours PRN only, Tylenol 1000 mg BID, Tylenol 1000 mg daily PRN    Controlled medications sent with patient:   not applicable/none     ROS:   Limited secondary to cognitive impairment but today pt reports 10 point ROS of systems including Constitutional, Eyes, Respiratory, Cardiovascular, Gastroenterology, Genitourinary, Integumentary, Musculoskeletal, Psychiatric were all negative except for pertinent positives noted in my HPI.    Physical Exam:   Vitals: BP 93/59   Pulse 73   Temp 98  F (36.7  C)   Resp 18   Ht 1.676 m (5' 6\")   Wt 47.8 kg (105 lb 6.4 oz)   SpO2 94%   BMI 17.01 kg/m     BMI= Body mass index is 17.01 kg/m .  GENERAL APPEARANCE:  Alert, in no distress, frail, pleasantly confused at baseline  RESP:  respiratory effort and palpation of chest normal, auscultation of lungs clear, no respiratory distress  CV:  Palpation and auscultation of heart done , rate and rhythm regular, no murmur, no LE peripheral edema  ABDOMEN:  normal bowel sounds, soft, nontender, no hepatosplenomegaly or other masses  M/S:   Gait and station ambulatory with walker, Digits and nails with arthritic changes, significantly reduced muscle mass  SKIN:  Inspection and Palpation of skin and subcutaneous tissue pale, incision healed and FABBY  PSYCH:  insight and judgement, memory with impairment affect and mood normal, follows commands readily         SNF labs: Labs done while at Skilled Nursing Facility done by [facility]. Pertinent results are: in narrative    DISCHARGE PLAN:    Follow up labs: Hgb recheck when meeting with PCP    Medical Follow Up:      Follow up with primary care provider in 1-2 weeks    MTM referral needed and placed by this provider: No    Current [facility] scheduled appointments: none at this time     Discharge Services: Home Care:  " Occupational Therapy, Physical Therapy, Registered Nurse and Home Health Aide    Discharge Instructions Verbalized to Patient at Discharge:     Weight bearing restrictions:  Weight bearing as tolerated.       TOTAL DISCHARGE TIME:   Greater than 30 minutes  Electronically signed by:  TUNG Wong CNP                         Sincerely,        TUNG Wong CNP

## 2019-12-04 ENCOUNTER — TELEPHONE (OUTPATIENT)
Dept: FAMILY MEDICINE | Facility: CLINIC | Age: 84
End: 2019-12-04

## 2019-12-04 NOTE — TELEPHONE ENCOUNTER
Reason for Call:  Other call back    Detailed comments:  called in, patient is recovering from a broken hip, when she was in the hospital her BP meds were changed, he would like a callback to discuss and be advised if patient should be seen by Chip Barlow.       Phone Number Patient can be reached at: Home number on file [phone] (home)    Best Time: anytime    Can we leave a detailed message on this number? YES    Call taken on 12/4/2019 at 1:29 PM by Deysi Rodgers

## 2019-12-04 NOTE — TELEPHONE ENCOUNTER
Spoke to Neeraj regarding his wife's medication. The only thing that changed in the hospital was that his wife's amlodipine was stopped. Per hospital notes her blood pressure was very labile.  She is in a TCU at this time. Scheduled a hospital follow up appointment.  Medication reconciled.  Tushar Díaz RN

## 2019-12-06 ENCOUNTER — DOCUMENTATION ONLY (OUTPATIENT)
Dept: CARE COORDINATION | Facility: CLINIC | Age: 84
End: 2019-12-06

## 2019-12-06 NOTE — PROGRESS NOTES
[facility] now requests orders and shares plan of care/discharge summaries for some patients through [employer].  Please REPLY TO THIS MESSAGE OR ROUTE BACK TO THE AUTHOR in order to give authorization for orders when needed.  This is considered a verbal order, you will still receive a faxed copy of orders for signature.  Thank you for your assistance in improving collaboration for our patients.    ORDER    Skilled nursing 2 week 1, 1 week 3, 3 PRN  OT eval and treat  PT eval and treat  SW assessment  HHA 2 week 3, 1 week 1    Hospital 11/7 to 11/11 and SNF 11/11 to 12/14 related to closed displaced intro trochanteric fracture of right femur d/t mechanical fall in home.     Luna Reddy, RN Admission Clinician  [facility]  [phone]

## 2019-12-11 ENCOUNTER — DOCUMENTATION ONLY (OUTPATIENT)
Dept: FAMILY MEDICINE | Facility: CLINIC | Age: 84
End: 2019-12-11

## 2019-12-11 NOTE — PROGRESS NOTES
[facility] now requests orders and shares plan of care/discharge summaries for some patients through [employer].  Please REPLY TO THIS MESSAGE OR ROUTE BACK TO THE AUTHOR in order to give authorization for orders when needed.  This is considered a verbal order, you will still receive a faxed copy of orders for signature.  Thank you for your assistance in improving collaboration for our patients.    ORDER ok for OT to cont 2w1, 1w1 for HEP for endurance and toileting/bathing safety.    Phyllis PERALTA/L  [phone]  [email or web address]

## 2019-12-13 ENCOUNTER — TELEPHONE (OUTPATIENT)
Dept: FAMILY MEDICINE | Facility: CLINIC | Age: 84
End: 2019-12-13

## 2019-12-13 NOTE — TELEPHONE ENCOUNTER
Reason for Call:  Other call back    Detailed comments: Aspen from [facility], calling because patient is having loose stools today. Patient's  Neeraj reports that patient was taking vancomycin and the TCU discontinued it before they left.     Neeraj is wondering if patient should re-start the vanco, or if there is something else they should do for patient's loose stools? Please call both Aspen, and patient's  Neeraj to inform.    Phone Number Patient can be reached at: Home number on file [phone] (home) [phone] (Aspen)    Best Time: anytime    Can we leave a detailed message on this number? YES    Call taken on 12/13/2019 at 1:03 PM by Aldair Guzman

## 2019-12-13 NOTE — TELEPHONE ENCOUNTER
Forms received from [facility]: order for OT 2 week 1, 1 week 1 for Jacinto Barlow PA-C.  Forms placed in provider 'sign me' folder.  Please fax forms to [phone] after completion.    Aldair Guzman

## 2019-12-13 NOTE — TELEPHONE ENCOUNTER
Routing to PCP to please advice, care advice given and appointment scheduled 12/16/19.    Homecare nurse reports pt BP 95/58, not she is not drinking much for fluids.    Concerned she did not have the full amount of Vancomycin medication as she had 8 tablets left over.    Spoke with Neeraj () he states she denies pain and nausea, had 3 bowel movements - all loose stool, foamy. 3rd day in a row.  He is trying to get her to drink more fluids, juice, rice milk, suggested using straw.  Neeraj followed the medication regimen and he completed the last day if he were home.  He says [facility] reportedly gave the medication as well.  He is not sure how they have 8 tablets left.     Pulsed-tapered regimen: 125 mg 4 times daily for 14 days, then 125 mg  twice daily for 7 days, then 125 mg once daily for 7 days, the every other  day for 4 weeks.    They do have an appointment on 12/16 with Jacinto Barlow PA-C.    Eleni Ndiaye RN

## 2019-12-16 ENCOUNTER — OFFICE VISIT (OUTPATIENT)
Dept: FAMILY MEDICINE | Facility: CLINIC | Age: 84
End: 2019-12-16
Payer: MEDICARE

## 2019-12-16 VITALS
TEMPERATURE: 98 F | HEIGHT: 66 IN | WEIGHT: 105 LBS | SYSTOLIC BLOOD PRESSURE: 114 MMHG | DIASTOLIC BLOOD PRESSURE: 70 MMHG | HEART RATE: 78 BPM | BODY MASS INDEX: 16.88 KG/M2 | OXYGEN SATURATION: 95 %

## 2019-12-16 DIAGNOSIS — R19.7 DIARRHEA, UNSPECIFIED TYPE: ICD-10-CM

## 2019-12-16 DIAGNOSIS — Z86.19 HISTORY OF CLOSTRIDIOIDES DIFFICILE INFECTION: ICD-10-CM

## 2019-12-16 DIAGNOSIS — S72.001D CLOSED FRACTURE OF RIGHT HIP WITH ROUTINE HEALING, SUBSEQUENT ENCOUNTER: Primary | ICD-10-CM

## 2019-12-16 PROCEDURE — 99214 OFFICE O/P EST MOD 30 MIN: CPT | Performed by: PHYSICIAN ASSISTANT

## 2019-12-16 PROCEDURE — 87493 C DIFF AMPLIFIED PROBE: CPT | Performed by: PHYSICIAN ASSISTANT

## 2019-12-16 ASSESSMENT — MIFFLIN-ST. JEOR: SCORE: 943.03

## 2019-12-16 NOTE — PROGRESS NOTES
Subjective     Ghada Magana is a 84 year old female who presents to clinic today for the following health issues:    HPI       Hospital Follow-up Visit:    Hospital/Nursing Home/ Rehab Facility: [facility]  Date of Admission: 11/7  Date of Discharge: 11/11 to a short term rehab facility   Reason(s) for Admission: Right hip fracture       Diarrhea - regularly - see previous notes. Still having loose, regular stools. It sounds like they did the full course of Vancomycin. No side effects.    Right hip - no pain. ROM is good. Seeing PT regularly without issues and notes that she's not having pain. No falls. Using a walker regularly.          Problems taking medications regularly:  None       Medication changes since discharge: None       Problems adhering to non-medication therapy:  None    Summary of hospitalization:  [facility] discharge summary reviewed  Diagnostic Tests/Treatments reviewed.  Follow up needed: none  Other Healthcare Providers Involved in Patient s Care:         None  Update since discharge: improved.     Post Discharge Medication Reconciliation: discharge medications reconciled, continue medications without change.  Plan of care communicated with patient     Coding guidelines for this visit:  Type of Medical   Decision Making Face-to-Face Visit       within 7 Days of discharge Face-to-Face Visit        within 14 days of discharge   Moderate Complexity 42292 [id number]   High Complexity 41405 75220            Patient Active Problem List   Diagnosis     Congenital anomaly of lung     Osteoporosis     Anemia     Hyperlipidemia LDL goal <130     Hypertension goal BP (blood pressure) < 140/90     Advanced directives, counseling/discussion     Chronic cough     Mycobacterium avium infection (H)     Dementia (H)     Mild major depression (H)     Past Surgical History:   Procedure Laterality Date     Right hip repair Right 11/2019    R hip fracture s/p repair [facility]     SURGICAL HISTORY OF -        "s/p bronchoscopy        Social History     Tobacco Use     Smoking status: Never Smoker     Smokeless tobacco: Never Used   Substance Use Topics     Alcohol use: Yes     Comment: occasionally     Family History   Problem Relation Age of Onset     Cancer Daughter         ovarian cancer     Cerebrovascular Disease Father      Hypertension Father            Reviewed and updated as needed this visit by Provider         Review of Systems   ROS COMP: Constitutional, HEENT, cardiovascular, pulmonary, GI, , musculoskeletal, neuro, skin, endocrine and psych systems are negative, except as otherwise noted.      Objective    /70   Pulse 78   Temp 98  F (36.7  C) (Oral)   Ht 1.676 m (5' 6\")   Wt 47.6 kg (105 lb)   SpO2 95%   BMI 16.95 kg/m    Body mass index is 16.95 kg/m .  Physical Exam   GENERAL: healthy, alert and no distress  ABDOMEN: soft, nontender, no hepatosplenomegaly, no masses and bowel sounds normal  MS: no gross musculoskeletal defects noted, no edema  SKIN: no suspicious lesions or rashes    Diagnostic Test Results:  Labs reviewed in Epic        Assessment & Plan     (S72.001D) Closed fracture of right hip with routine healing, subsequent encounter  (primary encounter diagnosis)  Comment:   Plan: Healing well. ROM intact. Her exam is reassuring. Recommend continue home PT. Home therapies. Follow up as needed.    (R19.7) Diarrhea, unspecified type  Comment:   Plan: Clostridium difficile Toxin B PCR        Recheck C Diff. May have a recurrent infection.    (Z86.19) History of Clostridioides difficile infection  Comment:   Plan: Clostridium difficile Toxin B PCR        Recheck C Diff. May have recurrent infection.         No follow-ups on file.    MERON EASON PA-C  [facility]    "

## 2019-12-16 NOTE — PATIENT INSTRUCTIONS
[facility]   Discharged by : Yessenia Cheung MA    Paper scripts provided to patient : no   If you have any questions regarding to your visit please contact your care team:     Team Silver              Clinic Hours Telephone Number     Dr. Sofie Barlow PA-C     7am-7pm  Monday - Thursday   7am-5pm  Fridays  [phone]   (Appointment scheduling available 24/7)     RN Line  [phone] option 2     Urgent Care - [place] - 11am-9pm Monday-Friday Saturday-Sunday- 9am-5pm     [place] -   5pm-9pm Monday-Friday Saturday-Sunday- 9am-5pm    [phone] - [place]    [phone] - [place]     For a Price Quote for your services, please call our [employer] Price Line at [phone].     What options do I have for visits at the clinic other than the traditional office visit?     To expand how we care for you, many of our providers are utilizing electronic visits (e-visits) and telephone visits, when medically appropriate, for interactions with their patients rather than a visit in the clinic. We also offer nurse visits for many medical concerns. Just like any other service, we will bill your insurance company for this type of visit based on time spent on the phone with your provider. Not all insurance companies cover these visits. Please check with your medical insurance if this type of visit is covered. You will be responsible for any charges that are not paid by your insurance.   E-visits via [employer]: generally incur a $45.00 fee.     Telephone visits:  Time spent on the phone: *charged based on time that is spent on the phone in increments of 10 minutes. Estimated cost:   5-10 mins $30.00   11-20 mins. $59.00   21-30 mins. $85.00       Use [employer] (secure email communication and access to your chart) to send your primary care provider a message or make an appointment. Ask someone on your Team how to sign up for [employer].   As always, Thank you  for trusting us with your health care needs!    [facility]:    Scheduling Appointments  [facility]  Call: [phone]    Marino Zheng, [facility]  Call: [phone]    [facility]  Call: [phone]    For Gastroenterology referrals   [facility]   Clinics and Surgery Center, 4th Floor   [address] [place] [address]   Appointments: [phone]    WHERE TO GO FOR CARE?  Clinic    Make an appointment if you:       Are sick (cold, cough, flu, sore throat, earache or in pain).       Have a small injury (sprain, small cut, burn or broken bone).       Need a physical exam, Pap smear, vaccine or prescription refill.       Have questions about your health or medicines.    To reach us:      Call [phone] ([phone]). Open 24 hours every day. (For counseling services, call [phone].)    Log into [employer] at [employer]. (Visit [employer]Intell[employer]actics to create an account.) Hospital emergency room    An emergency is a serious or life- threatening problem that must be treated right away.    Call 826 or get to the hospital if you have:      Very bad or sudden:            - Chest pain or pressure         - Bleeding         - Head or belly pain         - Dizziness or trouble seeing, walking or                          Speaking      Problems breathing      Blood in your vomit or you are coughing up blood      A major injury (knocked out, loss of a finger or limb, rape, broken bone protruding from skin)    A mental health crisis. (Or call the Mental Health Crisis line at 1-514.793.4290 or Suicide Prevention Hotline at 1-984.990.1815.)    Open 24 hours every day. You don't need an appointment.     Urgent care    Visit urgent care for sickness or small injuries when the clinic is closed. You don't need an appointment. To check hours or find an urgent care near you, visit [email or web address]. Online  care    Get online care from [facility] for more than 70 common problems, like colds, allergies and infections. Open 24 hours every day at:   [email or web address]   Need help deciding?    For advice about where to be seen, you may call your clinic and ask to speak with a nurse. We're here for you 24 hours every day.         If you are deaf or hard of hearing, please let us know. We provide many free services including sign language interpreters, oral interpreters, TTYs, telephone amplifiers, note takers and written materials.

## 2019-12-17 DIAGNOSIS — Z86.19 HISTORY OF CLOSTRIDIOIDES DIFFICILE INFECTION: ICD-10-CM

## 2019-12-17 DIAGNOSIS — R19.7 DIARRHEA, UNSPECIFIED TYPE: ICD-10-CM

## 2019-12-17 LAB
C DIFF TOX B STL QL: POSITIVE
SPECIMEN SOURCE: ABNORMAL

## 2019-12-18 ENCOUNTER — TRANSFERRED RECORDS (OUTPATIENT)
Dept: HEALTH INFORMATION MANAGEMENT | Facility: CLINIC | Age: 84
End: 2019-12-18

## 2019-12-18 ENCOUNTER — TELEPHONE (OUTPATIENT)
Dept: FAMILY MEDICINE | Facility: CLINIC | Age: 84
End: 2019-12-18

## 2019-12-18 NOTE — TELEPHONE ENCOUNTER
Message sent to Erin Myers in Pharmacy regarding Vancomycin cost. Will await reply.  Tushar Díaz RN

## 2019-12-18 NOTE — TELEPHONE ENCOUNTER
Team RN  Please call [facility]  -  C Diff results positive again.    Unfortunately, the cost of Vancomycin was astronomical for them.  I am not in the office - could someone please ask our Pharmacy if there is a lower cost option for vancomycin? (Liquid is sometimes better.)     Please let me know what we find out and I can RX - she will need to be on a prolonged dose of this - weeks, likely.    Thank you for helping.  LYNN Wei, PA-C

## 2019-12-19 ENCOUNTER — TELEPHONE (OUTPATIENT)
Dept: FAMILY MEDICINE | Facility: CLINIC | Age: 84
End: 2019-12-19

## 2019-12-19 DIAGNOSIS — S72.141D CLOSED DISPLACED INTERTROCHANTERIC FRACTURE OF RIGHT FEMUR WITH ROUTINE HEALING, SUBSEQUENT ENCOUNTER: ICD-10-CM

## 2019-12-19 DIAGNOSIS — Z47.89 AFTERCARE FOLLOWING SURGERY OF THE MUSCULOSKELETAL SYSTEM: ICD-10-CM

## 2019-12-19 NOTE — TELEPHONE ENCOUNTER
[facility]: [facility] and POC, 12/6-2/3 received.  Given to Team Tereso LARA for med rec.  Please give to provider for review and signature upon completion.    Please fax forms to [phone] after completion.    Aldair Guzman

## 2019-12-20 ENCOUNTER — TELEPHONE (OUTPATIENT)
Dept: FAMILY MEDICINE | Facility: CLINIC | Age: 84
End: 2019-12-20

## 2019-12-20 DIAGNOSIS — A04.72 C. DIFFICILE COLITIS: Primary | ICD-10-CM

## 2019-12-20 RX ORDER — VANCOMYCIN HYDROCHLORIDE 50 MG/ML
KIT ORAL
Qty: 250 ML | Refills: 0 | Status: SHIPPED | OUTPATIENT
Start: 2019-12-20 | End: 2021-05-06

## 2019-12-20 RX ORDER — ACETAMINOPHEN 325 MG/1
650 TABLET ORAL EVERY 4 HOURS PRN
COMMUNITY
Start: 2019-12-20

## 2019-12-20 RX ORDER — MEMANTINE HYDROCHLORIDE 10 MG/1
10 TABLET ORAL DAILY
COMMUNITY
Start: 2019-12-20 | End: 2020-03-10

## 2019-12-20 RX ORDER — ASPIRIN 81 MG/1
81 TABLET ORAL DAILY
COMMUNITY
Start: 2019-12-20 | End: 2021-05-10

## 2019-12-20 NOTE — TELEPHONE ENCOUNTER
Jacinto,    It turns out compounding can no longer make the oral solution because it is now available commercially.  I did test claims for the patient's insurance and found the commercial solution is significantly cheaper then the capsules.      If you want to order the solution - it is under the brand name Firvanq 50mg/ml - dose would be 2.5ml (125mg) by mouth 4 times daily.  Dispense package size #150ml.  This would cost the patient $139.24 at our pharmacy. It is not on her insurance formulary so I ran it through a coupon to get that price.  We could order this in for Monday morning.      If you want to prescribe the capsules the dose would be 125mg four times daily for 10 days. #40 . This is covered by her insurance but it is quite expensive as it looks like she in the doughnut hole.  Cost would be $432.89 through insurance.    Erin Myers PharmD, [facility]  Pharmacist Manager   [facility]  [phone]

## 2019-12-20 NOTE — TELEPHONE ENCOUNTER
Reviewed - thanks. We have to have her on a long, extended course (4+ weeks), longer than listed in the message below because this is a recurrence. It may cost more than the pricing listed.    We will have to use the compounding pharmacy to have them put this together -Rx is in team silver out box - please get the paper RX to [facility] and see if they run/order to compounding or if we have to send directly to compounding? I spoke with Erin but I didn't get that level of detail.    Let family know details as well. Thanks for helping coordinate this complex issue and let me know if questions.     Thanks.  Chip Barlow, MPAS, PA-C

## 2019-12-20 NOTE — TELEPHONE ENCOUNTER
Spoke with Rhett () about the medication costs.  He would like to proceed with the prescription and will  Monday after 11 am.    Eleni Ndiaye RN

## 2019-12-20 NOTE — TELEPHONE ENCOUNTER
Patient's spouse is calling wanting to know what they should be doing for her stools. Transferred to JANE Barrios.     Lisa Calvo,

## 2019-12-20 NOTE — TELEPHONE ENCOUNTER
Patient's spouse Rhett called for results - nurse excepted call. Relayed the message from PCP and the pharmacist to Rhett - verbalized understanding and agreement to the  $139.24 for the prescription.    Pharmacy is cued.    Routing to PCP to enter prescription.    Please notify patient when prescription sent to pharmacy.      Sophie oPe RN  [facility]

## 2019-12-23 ENCOUNTER — TELEPHONE (OUTPATIENT)
Dept: FAMILY MEDICINE | Facility: CLINIC | Age: 84
End: 2019-12-23

## 2019-12-23 DIAGNOSIS — Z53.9 DIAGNOSIS NOT YET DEFINED: Primary | ICD-10-CM

## 2019-12-23 PROCEDURE — 99207 C MD CERTIFICATION HHA PATIENT: CPT | Performed by: PHYSICIAN ASSISTANT

## 2019-12-23 NOTE — TELEPHONE ENCOUNTER
Central Prior Authorization Team   Phone: [phone]      PA Initiation    Medication: Firvanq-Initiated  Insurance Company: Medicare Blue - Phone [phone] Fax [phone]  Pharmacy Filling the Rx: [facility] - [place], [place] - [address] [place], [place] [address]Marcella  Filling Pharmacy Phone: [phone]  Filling Pharmacy Fax:    Start Date: 12/23/2019

## 2019-12-23 NOTE — TELEPHONE ENCOUNTER
Prior Authorization Retail Medication Request    Medication/Dose: Firvanq - not covered - dispensed 1st bottle as cash - see if eligible for PA - patient needs 3 more bottles to complete directions  ICD code (if different than what is on RX):    Previously Tried and Failed:    Rationale:      Insurance Name:  Medica Part D  Insurance ID:  [id number]      Pharmacy Information (if different than what is on RX)  Name:  [facility]  Phone:  [phone]    Erin Myers PharmD, [facility]  Pharmacist Manager   [facility]  [phone]

## 2019-12-23 NOTE — TELEPHONE ENCOUNTER
Prior Authorization Approval    Authorization Effective Date: 9/24/2019  Authorization Expiration Date: 12/22/2020  Medication: Firvanq-APPROVED  Approved Dose/Quantity:   Reference #:     Insurance Company: Medicare Blue - Phone [phone] Fax [phone]  Expected CoPay:       CoPay Card Available:      Foundation Assistance Needed:    Which Pharmacy is filling the prescription (Not needed for infusion/clinic administered): [facility] - [place], [place] - [address] [place] [address]  Pharmacy Notified: Yes  Patient Notified: No    Pharmacy will notify patient when medication is ready.

## 2019-12-27 ENCOUNTER — DOCUMENTATION ONLY (OUTPATIENT)
Dept: CARE COORDINATION | Facility: CLINIC | Age: 84
End: 2019-12-27

## 2019-12-27 NOTE — PROGRESS NOTES
[facility] now requests orders and shares plan of care/discharge summaries for some patients through [employer].  Please REPLY TO THIS MESSAGE OR ROUTE BACK TO THE AUTHOR in order to give authorization for orders when needed.  This is considered a verbal order, you will still receive a faxed copy of orders for signature.  Thank you for your assistance in improving collaboration for our patients.    ORDER  OK to continue home PT 1W3 for final phase of gait training?      Ray Garcia PT  [phone]  [email or web address]

## 2019-12-31 ENCOUNTER — TELEPHONE (OUTPATIENT)
Dept: FAMILY MEDICINE | Facility: CLINIC | Age: 84
End: 2019-12-31

## 2019-12-31 NOTE — TELEPHONE ENCOUNTER
Forms received from [facility]: order for OT 2 week 1, 1 week1 for Jacinto Barlow PA-C.  Forms placed in provider 'sign me' folder.  Please fax forms to [phone] after completion.    Aldair Guzman

## 2019-12-31 NOTE — TELEPHONE ENCOUNTER
Forms received from [facility]: order for PT 1 week 3 for Jacinto Barlow PA-C.  Forms placed in provider 'sign me' folder.  Please fax forms to [phone] after completion.    Aldair Guzman

## 2020-01-03 ENCOUNTER — TELEPHONE (OUTPATIENT)
Dept: FAMILY MEDICINE | Facility: CLINIC | Age: 85
End: 2020-01-03

## 2020-01-03 NOTE — TELEPHONE ENCOUNTER
Reason for Call: Request for an order or referral:    Order or referral being requested: Nursing order once a week for 4 weeks.  Two PRNs as needed.     Date needed: as soon as possible    Has the patient been seen by the PCP for this problem? Not Applicable    Additional comments: call back with verbal orders     Phone number Patient can be reached at:  Other phone number: [phone]  Best Time:  Any     Can we leave a detailed message on this number?  NO    Call taken on 1/3/2020 at 3:08 PM by Aster Peetrson;

## 2020-01-06 NOTE — TELEPHONE ENCOUNTER
Aspen with [facility] calling to check on status of orders request below. She is requesting a call back at [phone], ok to leave a message.

## 2020-01-06 NOTE — TELEPHONE ENCOUNTER
Message left with [facility] to please call back regarding Ghada's status so we can approve orders below.  An in house nurse will call about Ghada's status.    Eleni Ndiaye RN

## 2020-01-07 NOTE — TELEPHONE ENCOUNTER
Routing to PCP as FYI only.   Aspen from home care returns call to RN line, states they were going to discharge patient, but she continued to have loose stools from C. Diff and so is on Vancomycin. They will continue to monitor her through this for the next few weeks.   Verbal order to continue care provided.      Maria Elena Uriostegui RN

## 2020-01-10 ENCOUNTER — TELEPHONE (OUTPATIENT)
Dept: FAMILY MEDICINE | Facility: CLINIC | Age: 85
End: 2020-01-10

## 2020-01-10 NOTE — TELEPHONE ENCOUNTER
Forms received from [facility]: order for SN 1week3, 3 prn for Jacinto Barlow PA-C.  Forms placed in provider 'sign me' folder.  Please fax forms to [phone] after completion.    Aldair Guzman

## 2020-01-12 DIAGNOSIS — F32.0 MILD MAJOR DEPRESSION (H): ICD-10-CM

## 2020-01-12 DIAGNOSIS — I10 HYPERTENSION GOAL BP (BLOOD PRESSURE) < 140/90: ICD-10-CM

## 2020-01-13 NOTE — TELEPHONE ENCOUNTER
"Requested Prescriptions   Pending Prescriptions Disp Refills     carvedilol (COREG) 25 MG tablet [Pharmacy Med Name: Carvedilol Oral Tablet 25 MG]  Last Written Prescription Date:  9/19/2019  Last Fill Quantity: 180 tablet,  # refills: 0   Last office visit: 12/16/2019 with prescribing provider:  CHAU Barlow   Future Office Visit:   Next 5 appointments (look out 90 days)    Mar 11, 2020  1:00 PM CDT  [facility] with Henrry De Anda [facility]  [facility] [address] [place] [id number] [phone]        180 tablet 0     Sig: TAKE 1 TABLET BY MOUTH TWICE DAILY WITH MEALS       Beta-Blockers Protocol Passed - 1/12/2020 10:42 AM        Passed - Blood pressure under 140/90 in past 12 months     BP Readings from Last 3 Encounters:   12/16/19 114/70   12/02/19 93/59   11/22/19 (!) 168/77                 Passed - Patient is age 6 or older        Passed - Recent (12 mo) or future (30 days) visit within the authorizing provider's specialty     Patient has had an office visit with the authorizing provider or a provider within the authorizing providers department within the previous 12 mos or has a future within next 30 days. See \"Patient Info\" tab in inbasket, or \"Choose Columns\" in Meds & Orders section of the refill encounter.              Passed - Medication is active on med list              amLODIPine (NORVASC) 5 MG tablet [Pharmacy Med Name: amLODIPine Besylate Oral Tablet 5 MG]  DISCONTINUED        Last Written Prescription Date:  11/12/2019  Last Fill Quantity: 90 tablet,   # refills: 0  Last Office Visit: 12/16/2019  w/ Cain RITCHIE    Future Office visit:    Next 5 appointments (look out 90 days)    Mar 11, 2020  1:00 PM CDT  Return Visit with Henrry De Anda MD  [facility] [address] [place] [id number] [phone]           Routing refill request to provider for " "review/approval because:  Drug not active on patient's medication list   90 tablet 0     Sig: TAKE 1 TABLET BY MOUTH ONCE DAILY       Calcium Channel Blockers Protocol  Failed - 1/12/2020 10:42 AM        Failed - Medication is active on med list        Failed - Normal serum creatinine on file in past 12 months     Recent Labs   Lab Test 11/20/18  1334   CR 0.60             Passed - Blood pressure under 140/90 in past 12 months     BP Readings from Last 3 Encounters:   12/16/19 114/70   12/02/19 93/59   11/22/19 (!) 168/77             Passed - Recent (12 mo) or future (30 days) visit within the authorizing provider's specialty     Patient has had an office visit with the authorizing provider or a provider within the authorizing providers department within the previous 12 mos or has a future within next 30 days. See \"Patient Info\" tab in inbasket, or \"Choose Columns\" in Meds & Orders section of the refill encounter.              Passed - Patient is age 18 or older        Passed - No active pregnancy on record        Passed - No positive pregnancy test in past 12 months              sertraline (ZOLOFT) 100 MG tablet [Pharmacy Med Name: Sertraline HCl Oral Tablet 100 MG]  Last Written Prescription Date:  9/19/2019  Last Fill Quantity: 90 tablet,  # refills: 0   Last office visit: 12/16/2019 with prescribing provider:  CHAU Barlow   Future Office Visit:   Next 5 appointments (look out 90 days)    Mar 11, 2020  1:00 PM CDT  Return Visit with Henrry De Anda MD  [facility] ([facility]) [address] [place] [address] [phone]        90 tablet 0     Sig: TAKE 1 TABLET BY MOUTH ONCE DAILY       SSRIs Protocol Failed - 1/12/2020 10:42 AM        Failed - PHQ-9 score less than 5 in past 6 months     Please review last PHQ-9 score.   No flowsheet data found.  No flowsheet data found.            Passed - Medication is active on med list        Passed - Patient is " "age 18 or older        Passed - No active pregnancy on record        Passed - No positive pregnancy test in last 12 months        Passed - Recent (6 mo) or future (30 days) visit within the authorizing provider's specialty     Patient had office visit in the last 6 months or has a visit in the next 30 days with authorizing provider or within the authorizing provider's specialty.  See \"Patient Info\" tab in inbasket, or \"Choose Columns\" in Meds & Orders section of the refill encounter.            "

## 2020-01-15 ENCOUNTER — TRANSFERRED RECORDS (OUTPATIENT)
Dept: HEALTH INFORMATION MANAGEMENT | Facility: CLINIC | Age: 85
End: 2020-01-15

## 2020-01-15 RX ORDER — AMLODIPINE BESYLATE 5 MG/1
TABLET ORAL
Qty: 90 TABLET | Refills: 0 | OUTPATIENT
Start: 2020-01-15

## 2020-01-15 RX ORDER — CARVEDILOL 25 MG/1
TABLET ORAL
Qty: 180 TABLET | Refills: 0 | Status: SHIPPED | OUTPATIENT
Start: 2020-01-15 | End: 2020-04-27

## 2020-01-15 RX ORDER — SERTRALINE HYDROCHLORIDE 100 MG/1
TABLET, FILM COATED ORAL
Qty: 90 TABLET | Refills: 1 | Status: SHIPPED | OUTPATIENT
Start: 2020-01-15 | End: 2020-07-07

## 2020-01-15 NOTE — TELEPHONE ENCOUNTER
Amlodipine: Medication discontinued, refused.     Coreg: Prescription approved per [facility] Refill Protocol.    Sertraline: Routing refill request to provider for review/approval because:  PHQ-9 score:  No flowsheet data found.  Needs review.     Mile Hein RN, BSN, PHN  [facility] [place]

## 2020-01-16 DIAGNOSIS — I10 HYPERTENSION GOAL BP (BLOOD PRESSURE) < 140/90: ICD-10-CM

## 2020-01-16 NOTE — TELEPHONE ENCOUNTER
"Requested Prescriptions   Pending Prescriptions Disp Refills     losartan (COZAAR) 100 MG tablet [Pharmacy Med Name: Losartan Potassium Oral Tablet 100 MG]  Last Written Prescription Date:  10/15/2019  Last Fill Quantity: 90 tablet,  # refills: 0   Last office visit: 12/16/2019 with prescribing provider:  CHAU Barlow   Future Office Visit:   Next 5 appointments (look out 90 days)    Mar 11, 2020  1:00 PM CDT  Return Visit with Henrry De Anda MD  [facility] ([facility]) [address] [place] [id number] [phone]        90 tablet 0     Sig: Take 1 tablet by mouth once daily.       Angiotensin-II Receptors Failed - 1/16/2020 11:33 AM        Failed - Normal serum creatinine on file in past 12 months     Recent Labs   Lab Test 11/20/18  1334   CR 0.60             Failed - Normal serum potassium on file in past 12 months     Recent Labs   Lab Test 11/20/18  1334   POTASSIUM 5.2              Passed - Last blood pressure under 140/90 in past 12 months     BP Readings from Last 3 Encounters:   12/16/19 114/70   12/02/19 93/59   11/22/19 (!) 168/77             Passed - Recent (12 mo) or future (30 days) visit within the authorizing provider's specialty     Patient has had an office visit with the authorizing provider or a provider within the authorizing providers department within the previous 12 mos or has a future within next 30 days. See \"Patient Info\" tab in inbasket, or \"Choose Columns\" in Meds & Orders section of the refill encounter.              Passed - Medication is active on med list        Passed - Patient is age 18 or older        Passed - No active pregnancy on record        Passed - No positive pregnancy test in past 12 months        "

## 2020-01-20 DIAGNOSIS — I10 HYPERTENSION GOAL BP (BLOOD PRESSURE) < 140/90: ICD-10-CM

## 2020-01-20 RX ORDER — LOSARTAN POTASSIUM 100 MG/1
TABLET ORAL
Qty: 90 TABLET | Refills: 1 | Status: SHIPPED | OUTPATIENT
Start: 2020-01-20 | End: 2021-05-04

## 2020-01-20 RX ORDER — AMLODIPINE BESYLATE 5 MG/1
TABLET ORAL
Qty: 90 TABLET | Refills: 0 | Status: SHIPPED | OUTPATIENT
Start: 2020-01-20 | End: 2020-04-15

## 2020-01-20 NOTE — TELEPHONE ENCOUNTER
Medication is being filled for 1 time refill only due to:  needs follow up apt. apt made and refill sent.

## 2020-01-20 NOTE — TELEPHONE ENCOUNTER
Routing refill request to provider for review/approval because:  Allergy alert  Due for return office visit  Labs out of date.    Lita Koch RN  [facility]

## 2020-02-17 ENCOUNTER — OFFICE VISIT (OUTPATIENT)
Dept: FAMILY MEDICINE | Facility: CLINIC | Age: 85
End: 2020-02-17
Payer: MEDICARE

## 2020-02-17 VITALS
BODY MASS INDEX: 19.84 KG/M2 | SYSTOLIC BLOOD PRESSURE: 116 MMHG | TEMPERATURE: 97.4 F | DIASTOLIC BLOOD PRESSURE: 56 MMHG | HEIGHT: 62 IN | WEIGHT: 107.8 LBS | HEART RATE: 80 BPM

## 2020-02-17 DIAGNOSIS — F03.90 DEMENTIA WITHOUT BEHAVIORAL DISTURBANCE, UNSPECIFIED DEMENTIA TYPE: ICD-10-CM

## 2020-02-17 DIAGNOSIS — F32.0 MILD MAJOR DEPRESSION (H): ICD-10-CM

## 2020-02-17 DIAGNOSIS — I10 HYPERTENSION GOAL BP (BLOOD PRESSURE) < 140/90: Primary | ICD-10-CM

## 2020-02-17 DIAGNOSIS — R05.3 CHRONIC COUGH: ICD-10-CM

## 2020-02-17 DIAGNOSIS — E78.5 HYPERLIPIDEMIA LDL GOAL <130: ICD-10-CM

## 2020-02-17 DIAGNOSIS — J84.10 PULMONARY FIBROSIS (H): ICD-10-CM

## 2020-02-17 DIAGNOSIS — Z86.19 HISTORY OF CLOSTRIDIUM DIFFICILE INFECTION: ICD-10-CM

## 2020-02-17 PROCEDURE — 99214 OFFICE O/P EST MOD 30 MIN: CPT | Performed by: PHYSICIAN ASSISTANT

## 2020-02-17 ASSESSMENT — MIFFLIN-ST. JEOR: SCORE: 892.23

## 2020-02-17 NOTE — PROGRESS NOTES
Subjective     Ghada Magana is a 84 year old female who presents to clinic today for the following health issues:    HPI   Hyperlipidemia Follow-Up      Are you regularly taking any medication or supplement to lower your cholesterol?   No    Are you having muscle aches or other side effects that you think could be caused by your cholesterol lowering medication?  No    Hypertension Follow-up      Do you check your blood pressure regularly outside of the clinic? No     Are you following a low salt diet? No    Are your blood pressures ever more than 140 on the top number (systolic) OR more   than 90 on the bottom number (diastolic), for example 140/90? No    Depression Followup    How are you doing with your depression since your last visit? Patient is not sure    Are you having other symptoms that might be associated with depression? No    Have you had a significant life event?  No     Are you feeling anxious or having panic attacks?   No    Do you have any concerns with your use of alcohol or other drugs? No    Social History     Tobacco Use     Smoking status: Never Smoker     Smokeless tobacco: Never Used   Substance Use Topics     Alcohol use: Yes     Comment: occasionally     Drug use: No     PHQ 5/2/2019   Q9: Thoughts of better off dead/self-harm past 2 weeks More than half the days     No flowsheet data found.  Last PHQ-9 5/2/2019   1.  Little interest or pleasure in doing things 2   2.  Feeling down, depressed, or hopeless 2   3.  Trouble falling or staying asleep, or sleeping too much 1   4.  Feeling tired or having little energy 1   5.  Poor appetite or overeating 2   7.  Trouble concentrating 3   Q9: Thoughts of better off dead/self-harm past 2 weeks 2       Suicide Assessment Five-step Evaluation and Treatment (SAFE-T)      How many servings of fruits and vegetables do you eat daily?  2-3    On average, how many sweetened beverages do you drink each day (Examples: soda, juice, sweet tea, etc.  Do  "NOT count diet or artificially sweetened beverages)?   Apple and cranberry juice    How many days per week do you exercise enough to make your heart beat faster? 3 or less, PT exercises    How many minutes a day do you exercise enough to make your heart beat faster? 9 or less    How many days per week do you miss taking your medication? 0    Reviewed and updated as needed this visit by Provider         Review of Systems   ROS COMP: Constitutional, HEENT, cardiovascular, pulmonary, gi and gu systems are negative, except as otherwise noted.      Objective    /56 (BP Location: Right arm, Patient Position: Chair, Cuff Size: Adult Regular)   Pulse 80   Temp 97.4  F (36.3  C) (Oral)   Ht 1.575 m (5' 2\")   Wt 48.9 kg (107 lb 12.8 oz)   BMI 19.72 kg/m    Body mass index is 19.72 kg/m .  Physical Exam   GENERAL: healthy, alert and no distress  NECK: no adenopathy, no asymmetry, masses, or scars and thyroid normal to palpation  RESP: Rhonchi throughout  CV: regular rate and rhythm, normal S1 S2, no S3 or S4, no murmur, click or rub, no peripheral edema and peripheral pulses strong  ABDOMEN: soft, nontender, no hepatosplenomegaly, no masses and bowel sounds normal  MS: no gross musculoskeletal defects noted, no edema  PSYCH: Flat affect.     Diagnostic Test Results:  Labs reviewed in Epic      Assessment & Plan     (I10) Hypertension goal BP (blood pressure) < 140/90  (primary encounter diagnosis)  Comment: Stable  Plan: Will continue current medication.     (E78.5) Hyperlipidemia LDL goal <130  Comment: Stable  Plan: Will continue current medication.     (F03.90) Dementia without behavioral disturbance, unspecified dementia type (H)  Comment: Stable  Plan: Will continue current medications.     (F32.0) Mild major depression (H)  Comment: Doing much better, pt has been knitting a lot.   Plan: Will continue Zoloft.     (R05) Chronic cough  Comment: Stable  Plan: Continue supportive cares and cough drops which seem to " help.    (J84.10) Pulmonary fibrosis (H)  Comment: Stable, no new SOB or difficulty breathing.   Plan: Will continue to monitor.     (Z86.19) History of Clostridium difficile infection  Comment: Pt would like repeat lab work today which is reasonable, repeat C.diff has been ordered.   Plan: Clostridium difficile Toxin B PCR           Patient is agreement with the plan. Return to clinic if symptoms worsen or fail to improve.   See Patient Instructions    No follow-ups on file.    IAnneliese PA-S, saw the above patient in conjunction with Meron Barlow PA-C and served as medical scribe. Please reference provider signature.     MERON BARLOW PA-C  [facility]

## 2020-02-19 PROCEDURE — 87493 C DIFF AMPLIFIED PROBE: CPT | Performed by: PHYSICIAN ASSISTANT

## 2020-02-20 DIAGNOSIS — Z86.19 HISTORY OF CLOSTRIDIUM DIFFICILE INFECTION: ICD-10-CM

## 2020-02-20 LAB
C DIFF TOX B STL QL: POSITIVE
SPECIMEN SOURCE: ABNORMAL

## 2020-03-02 DIAGNOSIS — F32.0 MILD MAJOR DEPRESSION (H): ICD-10-CM

## 2020-03-03 ENCOUNTER — TELEPHONE (OUTPATIENT)
Dept: FAMILY MEDICINE | Facility: CLINIC | Age: 85
End: 2020-03-03

## 2020-03-03 DIAGNOSIS — Z22.1 CLOSTRIDIUM DIFFICILE CARRIER: Primary | ICD-10-CM

## 2020-03-03 NOTE — TELEPHONE ENCOUNTER
Team RN  Please call Ghada (her ) and find out how she's feeling.    Her C Diff test from a week ago came back positive again.    She might simply be an asymptomatic carrier. If she is having symptoms. Please let me know.     However, because she's been such a large challenge to treat and rid of this infection - I'd advise she consider seeing infectious disease.     [facility]: [facility] [place] [phone]   [email or web address]  [facility]: [facility] (Infectious Disease and HIV Clinic) - [place] [phone]   [email or web address]  N: [employer] - [place] [phone]   [email or web address]    Let me know how she's doing and if they have questions.    Thanks.  Chip Barlow

## 2020-03-03 NOTE — TELEPHONE ENCOUNTER
"Requested Prescriptions   Pending Prescriptions Disp Refills     mirtazapine (REMERON) 30 MG tablet  Last Written Prescription Date:  11/13/2019  Last Fill Quantity: 90 tabs,  # refills: 0   Last office visit: 2/17/2020 with prescribing provider:  Kevin   Future Office Visit:   Next 5 appointments (look out 90 days)    Mar 11, 2020  1:00 PM CDT  Return Visit with Henrry De Anda MD  [facility] [address] [place] [id number] [phone]        90 tablet 0       Atypical Antidepressants Protocol Failed - 3/2/2020  5:24 PM        Failed - Patient has PHQ-9 score less than 5 in past 6 months.     Please review last PHQ-9 score.           Passed - Medication active on med list        Passed - Patient is age 18 or older        Passed - No active pregnancy on record        Passed - No positive pregnancy test in past 12 mos        Passed - Recent (6 mo) or future (30 days) visit within the authorizing provider's specialty     Patient had office visit in the last 6 months or has a visit in the next 30 days with authorizing provider or within the authorizing provider's specialty.  See \"Patient Info\" tab in inbasket, or \"Choose Columns\" in Meds & Orders section of the refill encounter.             "

## 2020-03-03 NOTE — TELEPHONE ENCOUNTER
I called home number, Ghada answered but then put  on the phone.  He states she is not having symptoms such as fever, abdominal pain, or diarrhea.    Regarding seeing infectious disease, he says they saw ID for lung issue, was on three times a day antibiotic for a time and then the ID person told them they did not think they were getting any benefit and to just go back to the lung doctor; therefore, he is not very keen on seeing ID again for anything this time.    He says she does not seem bothered by the occasional loose stools, not losing weight.       Routed back to Jacinto Barlow, no need to call unless other plan advised.    plans to monitor for now.    Lita Koch RN  [facility]

## 2020-03-04 RX ORDER — MIRTAZAPINE 30 MG/1
30 TABLET, FILM COATED ORAL AT BEDTIME
Qty: 90 TABLET | Refills: 1 | Status: SHIPPED | OUTPATIENT
Start: 2020-03-04 | End: 2020-08-31

## 2020-03-04 NOTE — TELEPHONE ENCOUNTER
Routing refill request to provider for review/approval because:  PHQ-9 score:    PHQ 5/2/2019   Q9: Thoughts of better off dead/self-harm past 2 weeks More than half the days             Mile Hein RN, BSN, PHN  [facility] [place]

## 2020-03-10 NOTE — TELEPHONE ENCOUNTER
RECORDS RECEIVED FROM: Internal - C Diff- appt per pt's spouse   DATE RECEIVED: 03.18.2020   NOTES (Gather within 2 years) STATUS DETAILS   OFFICE NOTE from referring provider   Internal 03.03.2020 Chip Barlow PA-C     OFFICE NOTE from other specialist N/A    DISCHARGE SUMMARY from hospital N/A    DISCHARGE REPORT from the ER Care Everywhere 09.01.2019   LABS (any labs) Care Everywhere / Internal    MEDICATION LIST Internal    IMAGING  (NEED IMAGES AND REPORTS)     Osteomyelitis: Foot imaging  N/A    Liver Abscess: Abdominal imaging N/A    Other (anything related to diagnoses N/A

## 2020-03-10 NOTE — PROGRESS NOTES
ESTABLISHED PATIENT NEUROLOGY NOTE    DATE OF VISIT: 3/11/2020  CLINIC LOCATION: [facility]  MRN: [id number]  PATIENT NAME: Ghada Magana  YOB: 1935    PCP: MERON EASON PA-C    REASON FOR VISIT:   Chief Complaint   Patient presents with     Follow Up     no concerns      SUBJECTIVE:                                                      HISTORY OF PRESENT ILLNESS: Patient is here for follow up regarding Alzheimer's disease.  Last visit was on 9/11/2019.  At that time, Memantine was added to help her memory.  Please refer to my initial/other prior notes for further information.  The patient is accompanied by her  and daughter, who participate in interview.    Since the last visit, the patient reports no concerns. She is on 10 mg of donepezil daily and 10 mg of Namenda twice daily without noticeable side effects.  Denies interval development of new focal neurological symptoms.    Per her , patient's memory is getting worse.  There are no other concerns.    On review of systems, patient endorses recently positive C. difficile (on treatment), but no other active complaints. Medications, allergies, family and social history were also reviewed. There are no changes reported by patient.  REVIEW OF SYSTEMS:                                                    10-system review was completed. Pertinent positives are included in HPI. The remainder of ROS is negative.  EXAM:                                                    Physical Exam:   Vitals: /64 (BP Location: Left arm, Patient Position: Sitting, Cuff Size: Adult Regular)   Pulse 87   Temp 97.6  F (36.4  C) (Oral)   Wt 49 kg (108 lb)   SpO2 96%   BMI 19.75 kg/m      General: pt is in NAD, cooperative.  Skin: normal turgor, moist mucous membranes, no lesions/rashes noticed.  HEENT: ATNC, white sclera, normal conjunctiva.  Respiratory: Symmetric lung excursion, no accessory respiratory muscle use.  Abdomen: Non  distended.  Neurological: awake, cooperative, follows commands, no exam changes.  ASSESSMENT AND PLAN:                                                    Assessment: 84-year-old female patient with Alzheimer's disease presents for follow-up.  She is on 10 mg of donepezil daily and 10 mg of Namenda twice daily without noticeable side effects.  Her  reports that the patient is doing well.  No medication changes are needed at the present time.  We will plan to repeat MoCA at next follow-up in approximately 6 months unless her cognition significantly worsens prior to that.      Diagnoses:    ICD-10-CM    1. Late onset Alzheimer's disease without behavioral disturbance (H)  G30.1 donepezil (ARICEPT) 10 MG tablet    F02.80 memantine (NAMENDA) 10 MG tablet     Plan: At today's visit we thoroughly discussed current symptoms, available treatment options, and the plan.  We decided not to make any medication changes.  We will repeat cognitive testing at the next follow-up visit in approximately 6 months from now or sooner if needed.    Total Time: 25 minutes with > 50% spent counseling the patient and family on stated above assessment and recommendations.    Henrry De Anda MD  / Neurology

## 2020-03-10 NOTE — PATIENT INSTRUCTIONS
AFTER VISIT SUMMARY (AVS):    At today's visit we thoroughly discussed current symptoms, available treatment options, and the plan.  We decided not to make any medication changes.  We will repeat cognitive testing at the next follow-up visit in approximately 6 months from now or sooner if needed.    Please do not hesitate to call me with any questions or concerns.    Thanks.

## 2020-03-11 ENCOUNTER — OFFICE VISIT (OUTPATIENT)
Dept: NEUROLOGY | Facility: CLINIC | Age: 85
End: 2020-03-11
Payer: MEDICARE

## 2020-03-11 VITALS
DIASTOLIC BLOOD PRESSURE: 64 MMHG | SYSTOLIC BLOOD PRESSURE: 112 MMHG | OXYGEN SATURATION: 96 % | HEART RATE: 87 BPM | TEMPERATURE: 97.6 F | BODY MASS INDEX: 19.75 KG/M2 | WEIGHT: 108 LBS

## 2020-03-11 DIAGNOSIS — F02.80 LATE ONSET ALZHEIMER'S DISEASE WITHOUT BEHAVIORAL DISTURBANCE (H): Primary | ICD-10-CM

## 2020-03-11 DIAGNOSIS — G30.1 LATE ONSET ALZHEIMER'S DISEASE WITHOUT BEHAVIORAL DISTURBANCE (H): Primary | ICD-10-CM

## 2020-03-11 PROCEDURE — 99214 OFFICE O/P EST MOD 30 MIN: CPT | Performed by: PSYCHIATRY & NEUROLOGY

## 2020-03-11 RX ORDER — DONEPEZIL HYDROCHLORIDE 10 MG/1
10 TABLET, FILM COATED ORAL AT BEDTIME
Qty: 90 TABLET | Refills: 1 | Status: SHIPPED | OUTPATIENT
Start: 2020-03-11 | End: 2020-09-09

## 2020-03-11 RX ORDER — MEMANTINE HYDROCHLORIDE 10 MG/1
10 TABLET ORAL 2 TIMES DAILY
Qty: 180 TABLET | Refills: 1 | Status: SHIPPED | OUTPATIENT
Start: 2020-03-11 | End: 2020-09-09

## 2020-03-11 NOTE — LETTER
3/11/2020         RE: Ghada Magana  [address] [facility] [address]        Dear Colleague,    Thank you for referring your patient, Ghada Magana, to the [facility]. Please see a copy of my visit note below.    ESTABLISHED PATIENT NEUROLOGY NOTE    DATE OF VISIT: 3/11/2020  CLINIC LOCATION: [facility]  MRN: [id number]  PATIENT NAME: Ghada Magana  YOB: 1935    PCP: MERON EASON PA-C    REASON FOR VISIT:   Chief Complaint   Patient presents with     Follow Up     no concerns      SUBJECTIVE:                                                      HISTORY OF PRESENT ILLNESS: Patient is here for follow up regarding Alzheimer's disease.  Last visit was on 9/11/2019.  At that time, Memantine was added to help her memory.  Please refer to my initial/other prior notes for further information.  The patient is accompanied by her  and daughter, who participates in interview.    Since the last visit, the patient reports no concerns. She is on 10 mg of donepezil daily and 10 mg of Namenda twice daily without noticeable side effects.  Denies interval development of new focal neurological symptoms.    Per her , patient's memory is getting worse.  There are no other concerns.    On review of systems, patient endorses recently positive C. difficile (on treatment), but no other active complaints. Medications, allergies, family and social history were also reviewed. There are no changes reported by patient.  REVIEW OF SYSTEMS:                                                    10-system review was completed. Pertinent positives are included in HPI. The remainder of ROS is negative.  EXAM:                                                    Physical Exam:   Vitals: /64 (BP Location: Left arm, Patient Position: Sitting, Cuff Size: Adult Regular)   Pulse 87   Temp 97.6  F (36.4  C) (Oral)   Wt 49 kg (108 lb)   SpO2 96%   BMI 19.75  kg/m      General: pt is in NAD, cooperative.  Skin: normal turgor, moist mucous membranes, no lesions/rashes noticed.  HEENT: ATNC, white sclera, normal conjunctiva.  Respiratory: Symmetric lung excursion, no accessory respiratory muscle use.  Abdomen: Non distended.  Neurological: awake, cooperative, follows commands, no exam changes.  ASSESSMENT AND PLAN:                                                    Assessment: 84-year-old female patient with Alzheimer's disease presents for follow-up.  She is on 10 mg of donepezil daily and 10 mg of Namenda twice daily without noticeable side effects.  Her  reports that the patient is doing well.  No medication changes are needed at the present time.  We will plan to repeat MoCA at next follow-up in approximately 6 months unless her cognition significantly worsens prior to that.      Diagnoses:    ICD-10-CM    1. Late onset Alzheimer's disease without behavioral disturbance (H)  G30.1 donepezil (ARICEPT) 10 MG tablet    F02.80 memantine (NAMENDA) 10 MG tablet     Plan: At today's visit we thoroughly discussed current symptoms, available treatment options, and the plan.  We decided not to make any medication changes.  We will repeat cognitive testing at the next follow-up visit in approximately 6 months from now or sooner if needed.    Total Time: 25 minutes with > 50% spent counseling the patient and family on stated above assessment and recommendations.    Henrry De Anda MD  / Neurology      Again, thank you for allowing me to participate in the care of your patient.        Sincerely,        Henrry De Anda MD

## 2020-03-18 ENCOUNTER — PRE VISIT (OUTPATIENT)
Dept: INFECTIOUS DISEASES | Facility: CLINIC | Age: 85
End: 2020-03-18

## 2020-04-09 ENCOUNTER — TRANSFERRED RECORDS (OUTPATIENT)
Dept: HEALTH INFORMATION MANAGEMENT | Facility: CLINIC | Age: 85
End: 2020-04-09

## 2020-04-11 DIAGNOSIS — I10 HYPERTENSION GOAL BP (BLOOD PRESSURE) < 140/90: ICD-10-CM

## 2020-04-13 NOTE — TELEPHONE ENCOUNTER
"Requested Prescriptions   Pending Prescriptions Disp Refills     amLODIPine (NORVASC) 5 MG tablet [Pharmacy Med Name: amLODIPine Besylate Oral Tablet 5 MG]  Last Written Prescription Date:  1/20/2020  Last Fill Quantity: 90 tabs,  # refills: 0   Last office visit: 2/17/2020 with prescribing provider:  Cain   Future Office Visit:   90 tablet 0     Sig: TAKE 1 TABLET BY MOUTH ONCE DAILY       Calcium Channel Blockers Protocol  Failed - 4/11/2020 11:43 AM        Failed - Normal serum creatinine on file in past 12 months     Recent Labs   Lab Test 11/20/18  1334   CR 0.60       Ok to refill medication if creatinine is low          Passed - Blood pressure under 140/90 in past 12 months     BP Readings from Last 3 Encounters:   03/11/20 112/64   02/17/20 116/56   12/16/19 114/70                 Passed - Recent (12 mo) or future (30 days) visit within the authorizing provider's specialty     Patient has had an office visit with the authorizing provider or a provider within the authorizing providers department within the previous 12 mos or has a future within next 30 days. See \"Patient Info\" tab in inbasket, or \"Choose Columns\" in Meds & Orders section of the refill encounter.              Passed - Medication is active on med list        Passed - Patient is age 18 or older        Passed - No active pregnancy on record        Passed - No positive pregnancy test in past 12 months            "

## 2020-04-14 NOTE — TELEPHONE ENCOUNTER
Routing refill request to provider for review/approval because:  Labs not current:  Ale Peters RN

## 2020-04-15 RX ORDER — AMLODIPINE BESYLATE 5 MG/1
TABLET ORAL
Qty: 90 TABLET | Refills: 0 | Status: SHIPPED | OUTPATIENT
Start: 2020-04-15 | End: 2020-07-14

## 2020-04-22 ENCOUNTER — VIRTUAL VISIT (OUTPATIENT)
Dept: INFECTIOUS DISEASES | Facility: CLINIC | Age: 85
End: 2020-04-22
Attending: PHYSICIAN ASSISTANT
Payer: MEDICARE

## 2020-04-22 DIAGNOSIS — G30.9 ALZHEIMER'S DEMENTIA WITHOUT BEHAVIORAL DISTURBANCE, UNSPECIFIED TIMING OF DEMENTIA ONSET: ICD-10-CM

## 2020-04-22 DIAGNOSIS — Z86.19 HISTORY OF CLOSTRIDIOIDES DIFFICILE INFECTION: Primary | ICD-10-CM

## 2020-04-22 DIAGNOSIS — R19.5 LOOSE STOOLS: ICD-10-CM

## 2020-04-22 DIAGNOSIS — F02.80 ALZHEIMER'S DEMENTIA WITHOUT BEHAVIORAL DISTURBANCE, UNSPECIFIED TIMING OF DEMENTIA ONSET: ICD-10-CM

## 2020-04-22 NOTE — PROGRESS NOTES
Patient opted to conduct today's visit via telephone vs an in person visit to the clinic.       I spoke with the patient and her  over telephone.    Phone call contact time 17 minutes  Call Started at 14: 29  Call Ended at 14:46, total duration  Another 10 minutes spent on discussion with Dr. Child.      I have reviewed and updated the patient's Past Medical History, Social History, Family History and Medication List.   I have reviewed the note as documented below.     [facility]   Infectious Disease Clinic Note:  New Patient     Patient:  Ghada Magana, Date of birth 1935, Medical record number [id number]  Date of Visit:  04/22/2020  Consult requested by Dr. Carr for evaluation of C. Diff infection.         Assessment and Recommendations:     Recommendations:    - No need for any additional testing at this time.  - If patient develops any worsening diarrhea with watery frequent foul-smelling stools with or without fever, abdominal cramping/pain, fatigue, recommended to immediately seek medical attention to be tested for C. Difficile.  - Follow-up as needed    Assessment:  84-year-old female with history of Alzheimer, chronic cough in setting of pulmonary fibrosis, treated TEETEE, history of C. Difficile.    # History of C. Difficile diarrhea  First occurrence on 10/10/2019 treated with 10 days of vancomycin.  Second episode on 12/18/2019 treated with vancomycin taper    # C. difficile colonization  Loose stools, usually twice per day with C. difficile positive in stool from 2/19/2020    # History of Alzheimer  Patient's  helps with communication      - Isolation status:  Good hand hygiene.      Patient discussed with Dr. Junior Child.  Sierra Chapin MD, ID fellow, pager [phone]           History of the Infectious Disease lllness:     83 yo CF with h/o Alzheimer disease,chronic cough in setting of pulmonary fibrosis, treated pulmonary TEETEE, 2 episodes  of C. difficile diarrhea who was referred to ID for positive C. difficile in stool from 2/19/2020.  Patient was hospitalized for UTI/pyelonephritis in September 2020, after which she developed C. difficile diarrhea with positive C. difficile and stool from 10/10/2019.  At that time she was treated with oral vancomycin (10 or 14-day course).  In November patient fell and had right hip fracture, was hospitalized and underwent right hip femoral nailing on 11/8/2019 with possible antibiotic given perioperatively (patient and her  what antibiotic and for how long she was given during that hospital stay).  Later she developed diarrhea with positive C. difficile on 12/18/2019.  At that time she was treated with vancomycin taper (125 mg 4 times daily for 2 weeks, then twice daily for 7 days, then daily for 7 days, then 2 or daily for 2 weeks), which she finished by the end of January- beginning of February.  Patient complained of loose stools to her PCP, who again tested her for C. difficile with positive results on 2/19/2020.  Patient was not treated at that time as her stools were not watery, and she overall did not express symptoms concerning for C. difficile colitis at that time.   Today patient states she is having loose stools, usually twice a day. Her  helps with communication, and states that Ghada is doing well, does not appear sick, did not complain of abdominal pain.   He confirms that Ghada has soft BMs, brown, usually up to 2 times a day.   Review of Systems:  CONSTITUTIONAL:  No fevers or chills. No night sweats.  EYES: negative for icterus  ENT:  negative for hearing loss, tinnitus or sore throat  RESPIRATORY:  negative for cough, sputum, dyspnea  CARDIOVASCULAR:  negative for chest pain, palpitations  GASTROINTESTINAL:  negative for nausea, vomiting, diarrhea or constipation  GENITOURINARY:  negative for dysuria  HEME:  No easy bruising  INTEGUMENT:  negative for rash or pruritus  NEURO:   Negative for headache    Past Medical History:   Diagnosis Date     C. 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     Unspecified essential hypertension        Past Surgical History:   Procedure Laterality Date     Right hip repair Right 11/2019    R hip fracture s/p repair [facility]     SURGICAL HISTORY OF -       s/p bronchoscopy            Social History     Social History Narrative     Not on file     Social History     Tobacco Use     Smoking status: Never Smoker     Smokeless tobacco: Never Used   Substance Use Topics     Alcohol use: Yes     Comment: occasionally     Drug use: No       Immunization History   Administered Date(s) Administered     Influenza (High Dose) 3 valent vaccine 10/05/2015, 10/26/2017, 10/25/2018, 10/09/2019     Influenza (IIV3) PF 10/21/1996, 10/19/2011, 08/22/2012     Mantoux Tuberculin Skin Test 11/09/2010     TD (ADULT, 7+) 09/14/1998, 12/23/2008       Patient Active Problem List   Diagnosis     Congenital anomaly of lung     Osteoporosis     Anemia     Hyperlipidemia LDL goal <130     Hypertension goal BP (blood pressure) < 140/90     Advanced directives, counseling/discussion     Chronic cough     Mycobacterium avium infection (H)     Dementia (H)     Mild major depression (H)     Pulmonary fibrosis (H)       Current Outpatient Medications   Medication Sig     acetaminophen (TYLENOL) 325 MG tablet Take 2 tablets (650 mg) by mouth every 4 hours as needed for mild pain     amLODIPine (NORVASC) 5 MG tablet TAKE 1 TABLET BY MOUTH ONCE DAILY     aspirin 81 MG EC tablet Take 1 tablet (81 mg) by mouth daily For 30 days for DVT prophy     calcium carbonate-vitamin D (OS-ARMANDO) 600-400 MG-UNIT chewable tablet Take 1 chew tab by mouth daily     carvedilol (COREG) 25 MG tablet TAKE 1 TABLET BY  MOUTH TWICE DAILY WITH MEALS     donepezil (ARICEPT) 10 MG tablet Take 1 tablet (10 mg) by mouth At Bedtime     ipratropium (ATROVENT) 0.03 % nasal spray Spray 2 sprays into both nostrils 2 times daily as needed for rhinitis     losartan (COZAAR) 100 MG tablet Take 1 tablet by mouth once daily.     memantine (NAMENDA) 10 MG tablet Take 1 tablet (10 mg) by mouth 2 times daily     mirtazapine (REMERON) 30 MG tablet Take 1 tablet (30 mg) by mouth At Bedtime     sertraline (ZOLOFT) 100 MG tablet TAKE 1 TABLET BY MOUTH ONCE DAILY     thiamine mononitrate 100 MG TABS Take 100 mg by mouth daily     timolol maleate (TIMOPTIC) 0.5 % ophthalmic solution Place 1 drop into both eyes 2 times daily     vancomycin (FIRVANQ) 50 MG/ML oral solution 125 mg orally 4 times daily for 14 days, then 125 mg orally twice daily for 7 days, then 125 mg orally once daily for 7 days, then 125 mg orally every other day for 2 weeks     No current facility-administered medications for this visit.        Allergies   Allergen Reactions     Ace Inhibitors      Cough     Hctz      Increased nightime urination              Physical Exam:   Vitals were reviewed.  All vitals stable  There were no vitals taken for this visit.    Not performed.          Laboratory Data:     No results found for: ACD4    Inflammatory Markers    Recent Labs   Lab Test 10/10/14  1337   CRP <2.9       Metabolic Studies       Recent Labs   Lab Test 11/20/18  1334 10/25/18  1038 06/08/17  1751 03/28/16  0809 12/17/15  1346 07/24/14  1459    132* 136 138 134 134   POTASSIUM 5.2 4.5 4.7 4.0 4.2 4.1   CHLORIDE 99 98 102 102 101 98   CO2 28 26 25 27 26 20   ANIONGAP 8 8 9 8 7 16   BUN 22 19 26 17 22 24   CR 0.60 0.77 0.74 0.65 0.68 1.02   GFRESTIMATED >90 71 75 88 83 52*   * 95 155* 89 95 101*   ARMANDO 9.3 9.8 9.5 8.9 9.1 9.2       Hematology Studies      Recent Labs   Lab Test 10/25/18  1038 06/08/17  1751 03/28/16  0809 03/08/16  1146 03/04/16  1413 10/10/14  1337   WBC  10.5 8.5 6.7 9.0 15.8* 7.7   ANEU  --   --  3.2 6.1  --  4.4   ALYM  --   --  2.2 1.6  --  2.3   DARREN  --   --  0.9 0.9  --  0.7   AEOS  --   --  0.3 0.3  --  0.2   HGB 10.6* 11.9 10.5* 10.0* 9.8* 11.8   HCT 33.1* 36.5 32.7* 31.3* 30.1* 35.2    358 278 508* 363 389       Clotting Studies  No lab results found.    Urine Studies     Recent Labs   Lab Test 10/25/18  1047 05/12/16  1444 12/17/15  1346 11/27/15  1418   URINEPH 6.5 6.5 6.5 6.5   NITRITE Negative Negative Negative Positive*   LEUKEST Trace* Small* Moderate* Large*   WBCU 5-10* O - 2 2-5* 5-10*       CSF testing   No lab results found.    Invalid input(s): CADAM, EVPCR, ENTPCR, ENTEROVIRUS    Microbiology:  Last 6 Culture results with specimen source  Culture Micro   Date Value Ref Range Status   10/25/2018 >100,000 colonies/mL  mixed urogenital lashell    Final   12/17/2015   Final    50,000 to 100,000 colonies/mL mixed urogenital lashell Susceptibility testing not   routinely done     11/27/2015 >100,000 colonies/mL Escherichia coli (A)  Final    Specimen Description   Date Value Ref Range Status   02/19/2020 Feces  Final   12/16/2019 Feces  Final   10/10/2019 Feces  Final   09/23/2019 Feces  Final   10/25/2018 Midstream Urine  Final   12/17/2015 Midstream Urine  Final        Last check of C difficile  C Diff Toxin B PCR   Date Value Ref Range Status   02/19/2020 Positive (A) NEG^Negative Final     Comment:     Positive: Toxin producing C. difficile target DNA sequences detected, presumed   positive for C. difficile toxin B. C. difficile (Requires Enteric Isolation).      Clostridium difficile (Requires Enteric Isolation)  FDA approved assay performed using [employer] GeneXpert real-time PCR.             Imaging:  Results for orders placed or performed during the hospital encounter of 11/20/18   Head CT w/o contrast    Narrative    CT SCAN OF THE HEAD WITHOUT CONTRAST   11/20/2018 2:00 PM     HISTORY: Confused.     TECHNIQUE:  Axial images of the head and  coronal reformations without  IV contrast material. Radiation dose for this scan was reduced using  automated exposure control, adjustment of the mA and/or kV according  to patient size, or iterative reconstruction technique.    COMPARISON: None.    FINDINGS: There is no evidence of intracranial hemorrhage, mass, acute  infarct or anomaly. There is generalized atrophy of the brain. There  is low attenuation in the white matter of the cerebral hemispheres  consistent with sequelae of small vessel ischemic disease. Ventricular  size is within normal limits without evidence of hydrocephalus.     Mucosal thickening in the right maxillary sinus and left sphenoid  locule. The right maxillary sinus appears hypoplastic. The bony  calvarium and bones of the skull base appear intact.       Impression    IMPRESSION:     1. No evidence of acute intracranial hemorrhage, mass, or herniation.  2. There is generalized atrophy of the brain. White matter changes are  present in the cerebral hemispheres that are consistent with small  vessel ischemic disease in this age patient.      ROSALIO VOSS MD

## 2020-04-25 DIAGNOSIS — I10 HYPERTENSION GOAL BP (BLOOD PRESSURE) < 140/90: ICD-10-CM

## 2020-04-25 NOTE — TELEPHONE ENCOUNTER
"Requested Prescriptions   Pending Prescriptions Disp Refills     carvedilol (COREG) 25 MG tablet [Pharmacy Med Name: Carvedilol Oral Tablet 25 MG]  Last Written Prescription Date:  1/15/2020  Last Fill Quantity: 180 tabs,  # refills: 0   Last office visit: 2/17/2020 with prescribing provider:  Cain   Future Office Visit:   180 tablet 0     Sig: TAKE 1 TABLET BY MOUTH TWICE DAILY WITH MEALS.       Beta-Blockers Protocol Passed - 4/25/2020 10:04 AM        Passed - Blood pressure under 140/90 in past 12 months     BP Readings from Last 3 Encounters:   03/11/20 112/64   02/17/20 116/56   12/16/19 114/70                 Passed - Patient is age 6 or older        Passed - Recent (12 mo) or future (30 days) visit within the authorizing provider's specialty     Patient has had an office visit with the authorizing provider or a provider within the authorizing providers department within the previous 12 mos or has a future within next 30 days. See \"Patient Info\" tab in inbasket, or \"Choose Columns\" in Meds & Orders section of the refill encounter.              Passed - Medication is active on med list            "

## 2020-04-26 NOTE — PROGRESS NOTES
Infectious Disease Clinic Staff Note: Ms. Magana had a Telephone New Patient Visit today and I participated in and discussed her case with Dr. Chapin, ID Fellow -- I agree with her consultative history, assessment and plan in this outpatient ID Virtual Consult note. This note reflects my observations and opinions and the plan outlined fully reflects my approach. I have reviewed the available history, radiology, laboratory results, and reports with the Fellow.

## 2020-04-27 RX ORDER — CARVEDILOL 25 MG/1
TABLET ORAL
Qty: 180 TABLET | Refills: 2 | Status: SHIPPED | OUTPATIENT
Start: 2020-04-27 | End: 2021-02-09

## 2020-04-27 NOTE — TELEPHONE ENCOUNTER
Prescription approved per [facility] Refill Protocol.  Fatimah Carbajal RN.

## 2020-07-06 DIAGNOSIS — F32.0 MILD MAJOR DEPRESSION (H): ICD-10-CM

## 2020-07-07 RX ORDER — SERTRALINE HYDROCHLORIDE 100 MG/1
TABLET, FILM COATED ORAL
Qty: 90 TABLET | Refills: 0 | Status: SHIPPED | OUTPATIENT
Start: 2020-07-07 | End: 2020-10-06

## 2020-07-11 DIAGNOSIS — I10 HYPERTENSION GOAL BP (BLOOD PRESSURE) < 140/90: ICD-10-CM

## 2020-07-14 RX ORDER — AMLODIPINE BESYLATE 5 MG/1
TABLET ORAL
Qty: 90 TABLET | Refills: 0 | Status: SHIPPED | OUTPATIENT
Start: 2020-07-14 | End: 2020-10-13

## 2020-07-22 ENCOUNTER — TRANSFERRED RECORDS (OUTPATIENT)
Dept: HEALTH INFORMATION MANAGEMENT | Facility: CLINIC | Age: 85
End: 2020-07-22

## 2020-08-29 DIAGNOSIS — F32.0 MILD MAJOR DEPRESSION (H): ICD-10-CM

## 2020-08-31 RX ORDER — MIRTAZAPINE 30 MG/1
30 TABLET, FILM COATED ORAL AT BEDTIME
Qty: 90 TABLET | Refills: 0 | Status: SHIPPED | OUTPATIENT
Start: 2020-08-31 | End: 2020-12-01

## 2020-08-31 NOTE — TELEPHONE ENCOUNTER
Routing refill request to provider for review/approval because:  Failed protocol: PHQ-9, hasnt been seen in the last 6 moths       Yen Pyle RN

## 2020-09-08 NOTE — PROGRESS NOTES
ESTABLISHED PATIENT NEUROLOGY NOTE    DATE OF VISIT: 2020  CLINIC LOCATION: [facility]  MRN: [id number]  PATIENT NAME: Ghada Magana  YOB: 1935    PCP: MERON EASON PA-C    REASON FOR VISIT:   Chief Complaint   Patient presents with     Follow Up     no concerns doing well      SUBJECTIVE:                                                      HISTORY OF PRESENT ILLNESS: Patient is here for a follow up regarding Alzheimer's disease.  She was last seen on 3/11/2020.  No medication changes were made at that time.  Please refer to my initial/other prior notes for further information.  The patient is accompanied by her  and daughter who participate in the interview.    Since the last visit, the patient reports no significant changes.  She is on 10 mg of donepezil at bedtime and 10 mg of Namenda twice daily without noticeable side effects.  She denies interval development of new focal neurological symptoms.    According to her family, patient's memory is stable, possibly slightly worse.  No other concerns.    On review of systems, patient endorses no additional active complaints. Medications, allergies, family and social history were also reviewed. There are no changes reported by patient.  REVIEW OF SYSTEMS:                                                    10-system review was completed. Pertinent positives are included in HPI. The remainder of ROS is negative.  EXAM:                                                    Physical Exam:   Vitals: /64 (BP Location: Right arm, Patient Position: Sitting, Cuff Size: Adult Regular)   Pulse 78   Temp 98.8  F (37.1  C) (Oral)   SpO2 93%   Marco Cognitive Assessment:    [place] Cognitive Assessment (MOCA)  Visuospatial/Executive : 3  Namin  Attention - Digits: 2  Attention - Letters: 0  Attention - Subtraction: 0  Language - Repeat: 0  Language - Fluency : 0  Abstraction: 0  Delayed Recall: 0  Orientation:  0  Education: 1  MOCA Score: 8     [place] Cognitive Assessment Score:  MOCA Score: 8/30.     General: pt is in NAD, cooperative.  Skin: normal turgor, moist mucous membranes, no lesions/rashes noticed.  HEENT: ATNC, white sclera, normal conjunctiva.  Respiratory: Symmetric lung excursion, no accessory respiratory muscle use.  Abdomen: Non distended.  Neurological: awake, cooperative, follows commands, MoCA as per above, no other exam changes.  ASSESSMENT AND PLAN:                                                    Assessment: 85-year-old female patient with Alzheimer's disease presents for a follow-up.  She is on donepezil and Namenda without noticeable side effects.  Her memory is stable, possibly mildly worse according to her family.    We had a detailed discussion with the patient and her family regarding her presenting complaints, cognitive testing, available treatment options, and the plan.  Her MoCA today is 8/30 compared to 8/30 in September 2019 and 13/30 in October 2018.  I do not think that we need to make any medication changes.  Prescriptions were refilled.  In the future, might consider switching her donepezil to galantamine or rivastigmine to see if it provides better cognitive benefit.  We will plan to repeat MoCA in 1 year.    Diagnoses:    ICD-10-CM    1. Late onset Alzheimer's disease without behavioral disturbance (H)  G30.1     F02.80      Plan: At today's visit we thoroughly discussed current symptoms, cognitive testing results (stable), available treatment options, and the plan.    No medication changes.  I refilled her medications.    Next follow-up appointment is in the next 6 months or earlier if needed.    Total Time: 25 minutes with > 50% spent counseling the patient and her family on stated above assessment and recommendations.    Henrry De Anda MD  HP/ Neurology

## 2020-09-08 NOTE — PATIENT INSTRUCTIONS
AFTER VISIT SUMMARY (AVS):    At today's visit we thoroughly discussed current symptoms, cognitive testing results (stable), available treatment options, and the plan.    No medication changes.  I refilled your medications.    Next follow-up appointment is in the next 6 months or earlier if needed.    Please do not hesitate to call me with any questions or concerns.    Thanks.

## 2020-09-09 ENCOUNTER — OFFICE VISIT (OUTPATIENT)
Dept: NEUROLOGY | Facility: CLINIC | Age: 85
End: 2020-09-09
Payer: MEDICARE

## 2020-09-09 VITALS
HEART RATE: 78 BPM | TEMPERATURE: 98.8 F | OXYGEN SATURATION: 93 % | DIASTOLIC BLOOD PRESSURE: 64 MMHG | SYSTOLIC BLOOD PRESSURE: 109 MMHG

## 2020-09-09 DIAGNOSIS — G30.1 LATE ONSET ALZHEIMER'S DISEASE WITHOUT BEHAVIORAL DISTURBANCE (H): Primary | ICD-10-CM

## 2020-09-09 DIAGNOSIS — F02.80 LATE ONSET ALZHEIMER'S DISEASE WITHOUT BEHAVIORAL DISTURBANCE (H): Primary | ICD-10-CM

## 2020-09-09 PROCEDURE — 99214 OFFICE O/P EST MOD 30 MIN: CPT | Performed by: PSYCHIATRY & NEUROLOGY

## 2020-09-09 RX ORDER — MEMANTINE HYDROCHLORIDE 10 MG/1
10 TABLET ORAL 2 TIMES DAILY
Qty: 182 TABLET | Refills: 1 | Status: SHIPPED | OUTPATIENT
Start: 2020-09-09 | End: 2021-03-11

## 2020-09-09 RX ORDER — DONEPEZIL HYDROCHLORIDE 10 MG/1
10 TABLET, FILM COATED ORAL AT BEDTIME
Qty: 91 TABLET | Refills: 1 | Status: SHIPPED | OUTPATIENT
Start: 2020-09-09 | End: 2021-03-11

## 2020-09-09 ASSESSMENT — MONTREAL COGNITIVE ASSESSMENT (MOCA)
10. [FLUENCY] NAME WORDS STARTING WITH DESIGNATED LETTER: 0
6. READ LIST OF DIGITS [FORWARD/BACKWARD]: 2
7. [VIGILENCE] TAP WHEN HEARING DESIGNATED LETTER: 0
VISUOSPATIAL/EXECUTIVE SUBSCORE: 3
12. MEMORY INDEX SCORE: 0
11. FOR EACH PAIR OF WORDS, WHAT CATEGORY DO THEY BELONG TO (OUT OF 2): 0
WHAT IS THE TOTAL SCORE (OUT OF 30): 8
13. ORIENTATION SUBSCORE: 0
WHAT LEVEL OF EDUCATION WAS ATTAINED: 1
8. SERIAL SUBTRACTION OF 7S: 0
9. REPEAT EACH SENTENCE: 0
4. NAME EACH OF THE THREE ANIMALS SHOWN: 2

## 2020-09-09 NOTE — LETTER
9/9/2020         RE: Ghada Magana  [address] [facility] [address]        Dear Colleague,    Thank you for referring your patient, Ghada Magana, to the [facility]. Please see a copy of my visit note below.    ESTABLISHED PATIENT NEUROLOGY NOTE    DATE OF VISIT: 9/9/2020  CLINIC LOCATION: [facility]  MRN: [id number]  PATIENT NAME: Ghada Magana  YOB: 1935    PCP: MERON EASON PA-C    REASON FOR VISIT:   Chief Complaint   Patient presents with     Follow Up     no concerns doing well      SUBJECTIVE:                                                      HISTORY OF PRESENT ILLNESS: Patient is here for a follow up regarding Alzheimer's disease.  She was last seen on 3/11/2020.  No medication changes were made at that time.  Please refer to my initial/other prior notes for further information.  The patient is accompanied by her  and daughter who participate in the interview.    Since the last visit, the patient reports no significant changes.  She is on 10 mg of donepezil at bedtime and 10 mg of Namenda twice daily without noticeable side effects.  She denies interval development of new focal neurological symptoms.    According to her family, patient's memory is stable, possibly slightly worse.  No other concerns.    On review of systems, patient endorses no additional active complaints. Medications, allergies, family and social history were also reviewed. There are no changes reported by patient.  REVIEW OF SYSTEMS:                                                    10-system review was completed. Pertinent positives are included in HPI. The remainder of ROS is negative.  EXAM:                                                    Physical Exam:   Vitals: /64 (BP Location: Right arm, Patient Position: Sitting, Cuff Size: Adult Regular)   Pulse 78   Temp 98.8  F (37.1  C) (Oral)   SpO2 93%   Marco Cognitive  Assessment:    [place] Cognitive Assessment (MOCA)  Visuospatial/Executive : 3  Namin  Attention - Digits: 2  Attention - Letters: 0  Attention - Subtraction: 0  Language - Repeat: 0  Language - Fluency : 0  Abstraction: 0  Delayed Recall: 0  Orientation: 0  Education: 1  MOCA Score: 8     [place] Cognitive Assessment Score:  MOCA Score: 8/30.     General: pt is in NAD, cooperative.  Skin: normal turgor, moist mucous membranes, no lesions/rashes noticed.  HEENT: ATNC, white sclera, normal conjunctiva.  Respiratory: Symmetric lung excursion, no accessory respiratory muscle use.  Abdomen: Non distended.  Neurological: awake, cooperative, follows commands, MoCA as per above, no other exam changes.  ASSESSMENT AND PLAN:                                                    Assessment: 85-year-old female patient with Alzheimer's disease presents for a follow-up.  She is on donepezil and Namenda without noticeable side effects.  Her memory is stable, possibly mildly worse according to her family.    We had a detailed discussion with the patient and her family regarding her presenting complaints, cognitive testing, available treatment options, and the plan.  Her MoCA today is 8/30 compared to 8/30 in 2019 and 13/30 in 2018.  I do not think that we need to make any medication changes.  Prescriptions were refilled.  In the future, might consider switching her donepezil to galantamine or rivastigmine to see if it provides better cognitive benefit.  We will plan to repeat MoCA in 1 year.    Diagnoses:    ICD-10-CM    1. Late onset Alzheimer's disease without behavioral disturbance (H)  G30.1     F02.80      Plan: At today's visit we thoroughly discussed current symptoms, cognitive testing results (stable), available treatment options, and the plan.    No medication changes.  I refilled her medications.    Next follow-up appointment is in the next 6 months or earlier if needed.    Total Time: 25 minutes  with > 50% spent counseling the patient and her family on stated above assessment and recommendations.    Henrry De Anda MD  HP/ Neurology      Again, thank you for allowing me to participate in the care of your patient.        Sincerely,        Henrry De Anda MD

## 2020-10-03 DIAGNOSIS — F32.0 MILD MAJOR DEPRESSION (H): ICD-10-CM

## 2020-10-06 RX ORDER — SERTRALINE HYDROCHLORIDE 100 MG/1
TABLET, FILM COATED ORAL
Qty: 90 TABLET | Refills: 1 | Status: SHIPPED | OUTPATIENT
Start: 2020-10-06 | End: 2021-04-13

## 2020-10-06 NOTE — TELEPHONE ENCOUNTER
Routing refill request to provider for review/approval because:  PHQ-9 score:    PHQ 5/2/2019   Q9: Thoughts of better off dead/self-harm past 2 weeks More than half the days               Mile Hein RN, BSN, PHN  [facility]: [place]

## 2020-10-10 DIAGNOSIS — I10 HYPERTENSION GOAL BP (BLOOD PRESSURE) < 140/90: ICD-10-CM

## 2020-10-12 ENCOUNTER — IMMUNIZATION (OUTPATIENT)
Dept: NURSING | Facility: CLINIC | Age: 85
End: 2020-10-12
Payer: MEDICARE

## 2020-10-12 DIAGNOSIS — Z23 NEED FOR PROPHYLACTIC VACCINATION AND INOCULATION AGAINST INFLUENZA: Primary | ICD-10-CM

## 2020-10-12 PROCEDURE — 90662 IIV NO PRSV INCREASED AG IM: CPT

## 2020-10-12 PROCEDURE — G0008 ADMIN INFLUENZA VIRUS VAC: HCPCS

## 2020-10-12 NOTE — PROGRESS NOTES
Prior to immunization administration, verified patients identity using patient s name and date of birth. Please see Immunization Activity for additional information.     Screening Questionnaire for Adult Immunization    Are you sick today?   No   Do you have allergies to medications, food, a vaccine component or latex?   No   Have you ever had a serious reaction after receiving a vaccination?   No   Do you have a long-term health problem with heart, lung, kidney, or metabolic disease (e.g., diabetes), asthma, a blood disorder, no spleen, complement component deficiency, a cochlear implant, or a spinal fluid leak?  Are you on long-term aspirin therapy?   No   Do you have cancer, leukemia, HIV/AIDS, or any other immune system problem?   No   Do you have a parent, brother, or sister with an immune system problem?   No   In the past 3 months, have you taken medications that affect  your immune system, such as prednisone, other steroids, or anticancer drugs; drugs for the treatment of rheumatoid arthritis, Crohn s disease, or psoriasis; or have you had radiation treatments?   No   Have you had a seizure, or a brain or other nervous system problem?   No   During the past year, have you received a transfusion of blood or blood    products, or been given immune (gamma) globulin or antiviral drug?   No   For women: Are you pregnant or is there a chance you could become       pregnant during the next month?   No   Have you received any vaccinations in the past 4 weeks?   No     Immunization questionnaire answers were all negative.        Per orders of  Jacinto Barolw PA-C , injection of Flu shot  given by Ailyn Kauffman MA. Patient instructed to remain in clinic for 15 minutes afterwards, and to report any adverse reaction to me immediately.       Screening performed by Ailyn Kauffman MA on 10/12/2020 at 12:17 PM.

## 2020-10-13 RX ORDER — AMLODIPINE BESYLATE 5 MG/1
TABLET ORAL
Qty: 90 TABLET | Refills: 0 | Status: SHIPPED | OUTPATIENT
Start: 2020-10-13 | End: 2021-01-12

## 2020-10-13 NOTE — TELEPHONE ENCOUNTER
Routing refill request to provider for review/approval because:  Labs out of range:    Creatinine   Date Value Ref Range Status   11/20/2018 0.60 0.52 - 1.04 mg/dL Final       Mile Hein RN, BSN, PHN  [facility]: [place]

## 2020-11-28 DIAGNOSIS — F32.0 MILD MAJOR DEPRESSION (H): ICD-10-CM

## 2020-12-01 RX ORDER — MIRTAZAPINE 30 MG/1
30 TABLET, FILM COATED ORAL AT BEDTIME
Qty: 90 TABLET | Refills: 1 | Status: SHIPPED | OUTPATIENT
Start: 2020-12-01 | End: 2021-05-06

## 2021-01-09 DIAGNOSIS — I10 HYPERTENSION GOAL BP (BLOOD PRESSURE) < 140/90: ICD-10-CM

## 2021-01-12 RX ORDER — AMLODIPINE BESYLATE 5 MG/1
TABLET ORAL
Qty: 90 TABLET | Refills: 0 | Status: SHIPPED | OUTPATIENT
Start: 2021-01-12 | End: 2021-04-13

## 2021-01-12 NOTE — TELEPHONE ENCOUNTER
Routing refill request to provider for review/approval because:  Pt was due in May for recheck  Creatinine   Date Value Ref Range Status   11/20/2018 0.60 0.52 - 1.04 mg/dL Final

## 2021-01-25 DIAGNOSIS — J34.89 DRAINAGE FROM NOSE: ICD-10-CM

## 2021-01-25 RX ORDER — IPRATROPIUM BROMIDE 21 UG/1
2 SPRAY, METERED NASAL 2 TIMES DAILY PRN
Status: CANCELLED | OUTPATIENT
Start: 2021-01-25

## 2021-01-25 NOTE — TELEPHONE ENCOUNTER
Reason for Call:  Medication or medication refill:    Do you use a [facility]?  Name of the pharmacy and phone number for the current request:  [facility] -  [place], .[phone]    Name of the medication requested: Ipratropium Bromide Nasal solution - 6% solution    Other request: pratropium Bromide Nasal solution - 6% solution, please specify the 6% solution as the last time they gave her a 3% and it was a mess.    Can we leave a detailed message on this number? YES    Phone number patient can be reached at: Home number on file [phone] (home)    Best Time: Any    Call taken on 1/25/2021 at 1:00 PM by Toya Delaney

## 2021-01-29 RX ORDER — IPRATROPIUM BROMIDE 42 UG/1
2 SPRAY, METERED NASAL 4 TIMES DAILY
Qty: 15 ML | Refills: 11 | Status: SHIPPED | OUTPATIENT
Start: 2021-01-29 | End: 2021-05-06

## 2021-03-08 ENCOUNTER — IMMUNIZATION (OUTPATIENT)
Dept: NURSING | Facility: CLINIC | Age: 86
End: 2021-03-08
Payer: MEDICARE

## 2021-03-08 PROCEDURE — 0031A PR COVID VAC JANSSEN AD26 0.5ML: CPT

## 2021-03-08 PROCEDURE — 91303 PR COVID VAC JANSSEN AD26 0.5ML: CPT

## 2021-03-11 ENCOUNTER — OFFICE VISIT (OUTPATIENT)
Dept: NEUROLOGY | Facility: CLINIC | Age: 86
End: 2021-03-11
Attending: PSYCHIATRY & NEUROLOGY
Payer: MEDICARE

## 2021-03-11 VITALS
HEART RATE: 75 BPM | OXYGEN SATURATION: 92 % | HEIGHT: 62 IN | DIASTOLIC BLOOD PRESSURE: 59 MMHG | SYSTOLIC BLOOD PRESSURE: 97 MMHG | TEMPERATURE: 97.9 F | BODY MASS INDEX: 19.75 KG/M2

## 2021-03-11 DIAGNOSIS — F02.80 LATE ONSET ALZHEIMER'S DISEASE WITHOUT BEHAVIORAL DISTURBANCE (H): Primary | ICD-10-CM

## 2021-03-11 DIAGNOSIS — G30.1 LATE ONSET ALZHEIMER'S DISEASE WITHOUT BEHAVIORAL DISTURBANCE (H): Primary | ICD-10-CM

## 2021-03-11 PROCEDURE — 99214 OFFICE O/P EST MOD 30 MIN: CPT | Performed by: PSYCHIATRY & NEUROLOGY

## 2021-03-11 PROCEDURE — G0463 HOSPITAL OUTPT CLINIC VISIT: HCPCS

## 2021-03-11 RX ORDER — DONEPEZIL HYDROCHLORIDE 10 MG/1
10 TABLET, FILM COATED ORAL AT BEDTIME
Qty: 91 TABLET | Refills: 1 | Status: SHIPPED | OUTPATIENT
Start: 2021-03-11 | End: 2021-09-14

## 2021-03-11 RX ORDER — MEMANTINE HYDROCHLORIDE 10 MG/1
10 TABLET ORAL 2 TIMES DAILY
Qty: 182 TABLET | Refills: 1 | Status: SHIPPED | OUTPATIENT
Start: 2021-03-11 | End: 2021-09-14

## 2021-03-11 NOTE — PROGRESS NOTES
"ESTABLISHED PATIENT NEUROLOGY NOTE    DATE OF VISIT: 3/11/2021  CLINIC LOCATION: [facility]  MRN: [id number]  PATIENT NAME: Ghada Magana  YOB: 1935    PCP: MERON EASON PA-C    REASON FOR VISIT:   Chief Complaint   Patient presents with     Neurologic Problem     memory     SUBJECTIVE:                                                      HISTORY OF PRESENT ILLNESS: Patient is here to follow up regarding Alzheimer's disease.  Her last visit was on 9/9/2020.  No medication changes were made.  Please refer to my initial/other prior notes for further information.  The patient is accompanied by her grandson and daughter, who participate in the interview.    Since the last visit, the patient reports no memory changes.  When asked a question, she states: \"I am here\".  Does not interact much.  She is on 10 mg of donepezil at bedtime and 10 mg of Namenda twice daily without noticeable side effects.  No interval development of new focal neurological symptoms.    According to her family, patient's memory is slightly worse, but there is no significant decline.  She is more sedentary.  Still does her knitting and word searches, but not much else.  No additional concerns.    On review of systems, patient endorses no other active complaints. Medications, allergies, family and social history were also reviewed. There are no changes reported by patient.  REVIEW OF SYSTEMS:                                                    10-system review was completed. Pertinent positives are included in HPI. The remainder of ROS is negative.  EXAM:                                                    Physical Exam:   Vitals: BP 97/59 (BP Location: Left arm, Patient Position: Sitting, Cuff Size: Adult Regular)   Pulse 75   Temp 97.9  F (36.6  C) (Oral)   Ht 1.575 m (5' 2\")   SpO2 92%   BMI 19.75 kg/m      General: pt is in NAD, cooperative.  Skin: normal turgor, moist mucous membranes, no " lesions/rashes noticed.  HEENT: ATNC, white sclera, normal conjunctiva.  Respiratory: Symmetric lung excursion, no accessory respiratory muscle use.  Abdomen: Non distended.  Neurological: awake, cooperative, follows commands, no aphasia or dysarthria noted, cranial nerves II-XII: no ptosis, face is symmetric, equally moves all extremities, no dysmetria bilaterally, presents in the wheelchair.  Gait was not checked today.  ASSESSMENT AND PLAN:                                                    Assessment: 85-year-old female patient with Alzheimer's disease presents for follow-up.  Family reports mild memory worsening, but no significant decline.  She is on donepezil and Namenda without noticeable side effects.  We planned to repeat her MoCA in September 2021 unless significant cognitive decline noticed.  We also discussed that we could consider trial of rivastigmine or galantamine instead of donepezil in such case.  I do not think that any medication changes are needed now.  Her prescriptions were refilled.    Diagnoses:    ICD-10-CM    1. Late onset Alzheimer's disease without behavioral disturbance (H)  G30.1     F02.80      Plan: At today's visit we thoroughly discussed current symptoms, available treatment options, and the plan.    No medication changes. Prescriptions were refilled.    We will repeat cognitive testing at the next visit.    Next follow-up appointment is in the next 6 months or earlier if needed.    Total Time: 30 minutes spent on the date of the encounter doing chart review, history and exam, documentation and further activities as noted above.    Henrry De Anda MD  [facility] Neurology  (Chart documentation was completed in part with Dragon voice-recognition software. Even though reviewed, some grammatical, spelling, and word errors may remain.)

## 2021-03-11 NOTE — LETTER
"    3/11/2021         RE: Ghada Magana  [address]  [facility] [address]        Dear Colleague,    Thank you for referring your patient, Ghada Magana, to the [facility] [place]. Please see a copy of my visit note below.    ESTABLISHED PATIENT NEUROLOGY NOTE    DATE OF VISIT: 3/11/2021  CLINIC LOCATION: [facility]  MRN: [id number]  PATIENT NAME: Ghada Magana  YOB: 1935    PCP: MERON EASON PA-C    REASON FOR VISIT:   Chief Complaint   Patient presents with     Neurologic Problem     memory     SUBJECTIVE:                                                      HISTORY OF PRESENT ILLNESS: Patient is here to follow up regarding Alzheimer's disease.  Her last visit was on 9/9/2020.  No medication changes were made.  Please refer to my initial/other prior notes for further information.  The patient is accompanied by her grandson and daughter, who participate in the interview.    Since the last visit, the patient reports no memory changes.  When asked a question, she states: \"I am here\".  Does not interact much.  She is on 10 mg of donepezil at bedtime and 10 mg of Namenda twice daily without noticeable side effects.  No interval development of new focal neurological symptoms.    According to her family, patient's memory is slightly worse, but there is no significant decline.  She is more sedentary.  Still does her knitting and word searches, but not much else.  No additional concerns.    On review of systems, patient endorses no other active complaints. Medications, allergies, family and social history were also reviewed. There are no changes reported by patient.  REVIEW OF SYSTEMS:                                                    10-system review was completed. Pertinent positives are included in HPI. The remainder of ROS is negative.  EXAM:                                                    Physical Exam:   Vitals: BP " "97/59 (BP Location: Left arm, Patient Position: Sitting, Cuff Size: Adult Regular)   Pulse 75   Temp 97.9  F (36.6  C) (Oral)   Ht 1.575 m (5' 2\")   SpO2 92%   BMI 19.75 kg/m      General: pt is in NAD, cooperative.  Skin: normal turgor, moist mucous membranes, no lesions/rashes noticed.  HEENT: ATNC, white sclera, normal conjunctiva.  Respiratory: Symmetric lung excursion, no accessory respiratory muscle use.  Abdomen: Non distended.  Neurological: awake, cooperative, follows commands, no aphasia or dysarthria noted, cranial nerves II-XII: no ptosis, face is symmetric, equally moves all extremities, no dysmetria bilaterally, presents in the wheelchair.  Gait was not checked today.  ASSESSMENT AND PLAN:                                                    Assessment: 85-year-old female patient with Alzheimer's disease presents for follow-up.  Family reports mild memory worsening, but no significant decline.  She is on donepezil and Namenda without noticeable side effects.  We planned to repeat her MoCA in September 2021 unless significant cognitive decline noticed.  We also discussed that we could consider trial of rivastigmine or galantamine instead of donepezil in such case.  I do not think that any medication changes are needed now.  Her prescriptions were refilled.    Diagnoses:    ICD-10-CM    1. Late onset Alzheimer's disease without behavioral disturbance (H)  G30.1     F02.80      Plan: At today's visit we thoroughly discussed current symptoms, available treatment options, and the plan.    No medication changes. Prescriptions were refilled.    We will repeat cognitive testing at the next visit.    Next follow-up appointment is in the next 6 months or earlier if needed.    Total Time: 30 minutes spent on the date of the encounter doing chart review, history and exam, documentation and further activities as noted above.    Henrry De Anda MD  [facility] Neurology  (Chart documentation was " completed in part with Dragon voice-recognition software. Even though reviewed, some grammatical, spelling, and word errors may remain.)      Again, thank you for allowing me to participate in the care of your patient.        Sincerely,        Henrry De Anda MD

## 2021-03-11 NOTE — PATIENT INSTRUCTIONS
AFTER VISIT SUMMARY (AVS):    At today's visit we thoroughly discussed current symptoms, available treatment options, and the plan.    No medication changes. Prescriptions were refilled.    We will repeat cognitive testing at the next visit.    Next follow-up appointment is in the next 6 months or earlier if needed.    Please do not hesitate to call me with any questions or concerns.    Thanks.

## 2021-03-18 ENCOUNTER — OFFICE VISIT (OUTPATIENT)
Dept: FAMILY MEDICINE | Facility: CLINIC | Age: 86
End: 2021-03-18
Payer: MEDICARE

## 2021-03-18 VITALS
SYSTOLIC BLOOD PRESSURE: 112 MMHG | WEIGHT: 114.4 LBS | OXYGEN SATURATION: 96 % | BODY MASS INDEX: 20.92 KG/M2 | HEART RATE: 82 BPM | TEMPERATURE: 97.5 F | DIASTOLIC BLOOD PRESSURE: 70 MMHG

## 2021-03-18 DIAGNOSIS — L03.031 CELLULITIS OF TOE OF RIGHT FOOT: Primary | ICD-10-CM

## 2021-03-18 PROCEDURE — 99213 OFFICE O/P EST LOW 20 MIN: CPT | Performed by: FAMILY MEDICINE

## 2021-03-18 RX ORDER — CEPHALEXIN 500 MG/1
500 CAPSULE ORAL 2 TIMES DAILY
Qty: 20 CAPSULE | Refills: 0 | Status: SHIPPED | OUTPATIENT
Start: 2021-03-18 | End: 2021-03-28

## 2021-03-18 NOTE — PROGRESS NOTES
Assessment & Plan     Cellulitis of toe of right foot  - cephALEXin (KEFLEX) 500 MG capsule; Take 1 capsule (500 mg) by mouth 2 times daily for 10 days      Return in about 4 days (around 3/22/2021) for if symptoms worsen or fail to improve.    Giulia Khan DO  [facility]    ===================================================================    Subjective   Ghada is a 85 year old who presents for the following health issues    HPI     Patient is here for red spot on toe on right foot that's possibly infected. Pt thinks it been there for maybe 2 days now.    Patient has dementia.  Her daughter noticed redness on her toe about 2 days ago.  No drainage.  Doesn't bother her much.        Review of Systems   Constitutional, HEENT, cardiovascular, pulmonary, gi and gu systems are negative, except as otherwise noted.      Objective    /70 (BP Location: Right arm, Patient Position: Chair, Cuff Size: Adult Regular)   Pulse 82   Temp 97.5  F (36.4  C) (Oral)   Wt 51.9 kg (114 lb 6.4 oz)   SpO2 96%   BMI 20.92 kg/m    Body mass index is 20.92 kg/m .  Physical Exam   GENERAL: healthy, alert and no distress  SKIN: Right 2nd toe with an area of erythema dorsally and a scab centrally.  No fluctuation.

## 2021-04-10 ENCOUNTER — TELEPHONE (OUTPATIENT)
Dept: FAMILY MEDICINE | Facility: CLINIC | Age: 86
End: 2021-04-10

## 2021-04-10 DIAGNOSIS — F32.0 MILD MAJOR DEPRESSION (H): ICD-10-CM

## 2021-04-10 DIAGNOSIS — I10 HYPERTENSION GOAL BP (BLOOD PRESSURE) < 140/90: ICD-10-CM

## 2021-04-13 RX ORDER — SERTRALINE HYDROCHLORIDE 100 MG/1
TABLET, FILM COATED ORAL
Qty: 90 TABLET | Refills: 0 | Status: SHIPPED | OUTPATIENT
Start: 2021-04-13 | End: 2021-05-06

## 2021-04-13 RX ORDER — AMLODIPINE BESYLATE 5 MG/1
TABLET ORAL
Qty: 90 TABLET | Refills: 0 | Status: SHIPPED | OUTPATIENT
Start: 2021-04-13 | End: 2021-05-06

## 2021-04-13 NOTE — TELEPHONE ENCOUNTER
Routing refill request to provider for review/approval because:  Labs not current:    Creatinine   Date Value Ref Range Status   11/20/2018 0.60 0.52 - 1.04 mg/dL Final

## 2021-04-29 ENCOUNTER — MEDICAL CORRESPONDENCE (OUTPATIENT)
Dept: HEALTH INFORMATION MANAGEMENT | Facility: CLINIC | Age: 86
End: 2021-04-29

## 2021-04-29 ENCOUNTER — TELEPHONE (OUTPATIENT)
Dept: FAMILY MEDICINE | Facility: CLINIC | Age: 86
End: 2021-04-29

## 2021-04-29 NOTE — TELEPHONE ENCOUNTER
Angelica with [facility] calling to speak with DENILSON Abel.     She is requesting history, physical, and medication list be sent to facility regarding this patient and has additional questions.     Routing to TC team to contact, as unable to transfer at time of call.     Mile Hein, RN, BSN, PHN  [facility]: [place]

## 2021-04-29 NOTE — TELEPHONE ENCOUNTER
No phone number for Angelica in patient's chart. Called and spoke to patient's daughter Forest.  Per forets, the phone number to call Angelica is: [phone].  Patient's daughter advised that we are still working on records and we will call back when its' ready. Forest verbalized understanding.

## 2021-04-29 NOTE — TELEPHONE ENCOUNTER
Called Sariah, daughter of patient and she is going to  her moms last physical, med list and also order for entry into [facility].  Sariah is going to  at our  and hand deliver to Angelica at [facility].    Walked paperwork to  and logged into book.    DENILSON Morejon  [facility]

## 2021-05-01 DIAGNOSIS — I10 HYPERTENSION GOAL BP (BLOOD PRESSURE) < 140/90: ICD-10-CM

## 2021-05-03 NOTE — TELEPHONE ENCOUNTER
Routing refill request to provider for review/approval because:  Labs not current:  Serum creatinine and potassium      Pending Prescriptions:                       Disp   Refills    losartan (COZAAR) 100 MG tablet [Pharmacy *90 tab*0        Sig: Take 1 tablet by mouth once daily.       Eleni Ndiaye RN

## 2021-05-04 RX ORDER — LOSARTAN POTASSIUM 100 MG/1
TABLET ORAL
Qty: 90 TABLET | Refills: 1 | Status: SHIPPED | OUTPATIENT
Start: 2021-05-04

## 2021-05-06 ENCOUNTER — OFFICE VISIT (OUTPATIENT)
Dept: FAMILY MEDICINE | Facility: CLINIC | Age: 86
End: 2021-05-06
Payer: MEDICARE

## 2021-05-06 VITALS
TEMPERATURE: 97.5 F | BODY MASS INDEX: 20.92 KG/M2 | SYSTOLIC BLOOD PRESSURE: 102 MMHG | DIASTOLIC BLOOD PRESSURE: 56 MMHG | OXYGEN SATURATION: 92 % | WEIGHT: 114.4 LBS | HEART RATE: 88 BPM

## 2021-05-06 DIAGNOSIS — Z00.00 ENCOUNTER FOR MEDICARE ANNUAL WELLNESS EXAM: Primary | ICD-10-CM

## 2021-05-06 DIAGNOSIS — Z86.19 HISTORY OF CLOSTRIDIUM DIFFICILE INFECTION: ICD-10-CM

## 2021-05-06 DIAGNOSIS — R05.3 CHRONIC COUGH: ICD-10-CM

## 2021-05-06 DIAGNOSIS — J34.89 DRAINAGE FROM NOSE: ICD-10-CM

## 2021-05-06 DIAGNOSIS — I10 HYPERTENSION GOAL BP (BLOOD PRESSURE) < 140/90: ICD-10-CM

## 2021-05-06 DIAGNOSIS — G30.9 ALZHEIMER'S DEMENTIA WITHOUT BEHAVIORAL DISTURBANCE, UNSPECIFIED TIMING OF DEMENTIA ONSET: ICD-10-CM

## 2021-05-06 DIAGNOSIS — J84.10 PULMONARY FIBROSIS (H): ICD-10-CM

## 2021-05-06 DIAGNOSIS — F32.0 MILD MAJOR DEPRESSION (H): ICD-10-CM

## 2021-05-06 DIAGNOSIS — F02.80 ALZHEIMER'S DEMENTIA WITHOUT BEHAVIORAL DISTURBANCE, UNSPECIFIED TIMING OF DEMENTIA ONSET: ICD-10-CM

## 2021-05-06 LAB
ANION GAP SERPL CALCULATED.3IONS-SCNC: 4 MMOL/L (ref 3–14)
BUN SERPL-MCNC: 26 MG/DL (ref 7–30)
CALCIUM SERPL-MCNC: 9.5 MG/DL (ref 8.5–10.1)
CHLORIDE SERPL-SCNC: 108 MMOL/L (ref 94–109)
CO2 SERPL-SCNC: 27 MMOL/L (ref 20–32)
CREAT SERPL-MCNC: 0.85 MG/DL (ref 0.52–1.04)
GFR SERPL CREATININE-BSD FRML MDRD: 62 ML/MIN/{1.73_M2}
GLUCOSE SERPL-MCNC: 79 MG/DL (ref 70–99)
POTASSIUM SERPL-SCNC: 4.1 MMOL/L (ref 3.4–5.3)
SODIUM SERPL-SCNC: 139 MMOL/L (ref 133–144)

## 2021-05-06 PROCEDURE — 90715 TDAP VACCINE 7 YRS/> IM: CPT | Performed by: PHYSICIAN ASSISTANT

## 2021-05-06 PROCEDURE — 90472 IMMUNIZATION ADMIN EACH ADD: CPT | Performed by: PHYSICIAN ASSISTANT

## 2021-05-06 PROCEDURE — G0439 PPPS, SUBSEQ VISIT: HCPCS | Performed by: PHYSICIAN ASSISTANT

## 2021-05-06 PROCEDURE — 80048 BASIC METABOLIC PNL TOTAL CA: CPT | Performed by: PHYSICIAN ASSISTANT

## 2021-05-06 PROCEDURE — 90732 PPSV23 VACC 2 YRS+ SUBQ/IM: CPT | Performed by: PHYSICIAN ASSISTANT

## 2021-05-06 PROCEDURE — 36415 COLL VENOUS BLD VENIPUNCTURE: CPT | Performed by: PHYSICIAN ASSISTANT

## 2021-05-06 PROCEDURE — G0009 ADMIN PNEUMOCOCCAL VACCINE: HCPCS | Performed by: PHYSICIAN ASSISTANT

## 2021-05-06 RX ORDER — IPRATROPIUM BROMIDE 42 UG/1
2 SPRAY, METERED NASAL 4 TIMES DAILY
Qty: 15 ML | Refills: 11 | Status: SHIPPED | OUTPATIENT
Start: 2021-05-06

## 2021-05-06 RX ORDER — AMLODIPINE BESYLATE 5 MG/1
5 TABLET ORAL DAILY
Qty: 90 TABLET | Refills: 1 | Status: SHIPPED | OUTPATIENT
Start: 2021-05-06

## 2021-05-06 RX ORDER — SERTRALINE HYDROCHLORIDE 100 MG/1
100 TABLET, FILM COATED ORAL DAILY
Qty: 90 TABLET | Refills: 1 | Status: SHIPPED | OUTPATIENT
Start: 2021-05-06

## 2021-05-06 RX ORDER — MIRTAZAPINE 30 MG/1
30 TABLET, FILM COATED ORAL AT BEDTIME
Qty: 90 TABLET | Refills: 1 | Status: SHIPPED | OUTPATIENT
Start: 2021-05-06

## 2021-05-06 RX ORDER — CARVEDILOL 25 MG/1
25 TABLET ORAL 2 TIMES DAILY WITH MEALS
Qty: 180 TABLET | Refills: 1 | Status: SHIPPED | OUTPATIENT
Start: 2021-05-06

## 2021-05-06 ASSESSMENT — ENCOUNTER SYMPTOMS
BREAST MASS: 0
COUGH: 1
CHILLS: 1
NERVOUS/ANXIOUS: 0
HEARTBURN: 0
CONSTIPATION: 0
FEVER: 0
ARTHRALGIAS: 1
JOINT SWELLING: 1
HEMATOCHEZIA: 0
HEMATURIA: 0
DIZZINESS: 0
HEADACHES: 0
ABDOMINAL PAIN: 0
EYE PAIN: 0

## 2021-05-06 ASSESSMENT — ACTIVITIES OF DAILY LIVING (ADL)
CURRENT_FUNCTION: PREPARING MEALS REQUIRES ASSISTANCE
CURRENT_FUNCTION: MEDICATION ADMINISTRATION REQUIRES ASSISTANCE
CURRENT_FUNCTION: TRANSPORTATION REQUIRES ASSISTANCE
CURRENT_FUNCTION: MONEY MANAGEMENT REQUIRES ASSISTANCE
CURRENT_FUNCTION: SHOPPING REQUIRES ASSISTANCE
CURRENT_FUNCTION: TELEPHONE REQUIRES ASSISTANCE
CURRENT_FUNCTION: BATHING REQUIRES ASSISTANCE
CURRENT_FUNCTION: LAUNDRY REQUIRES ASSISTANCE
CURRENT_FUNCTION: HOUSEWORK REQUIRES ASSISTANCE

## 2021-05-06 NOTE — LETTER
May 7, 2021      Ghadaelmer Casperert  [address] [place] [address]        Dear ,    We are writing to inform you of your normal test results.      Resulted Orders   Basic metabolic panel  (Ca, Cl, CO2, Creat, Gluc, K, Na, BUN)   Result Value Ref Range    Sodium 139 133 - 144 mmol/L    Potassium 4.1 3.4 - 5.3 mmol/L    Chloride 108 94 - 109 mmol/L    Carbon Dioxide 27 20 - 32 mmol/L    Anion Gap 4 3 - 14 mmol/L    Glucose 79 70 - 99 mg/dL    Urea Nitrogen 26 7 - 30 mg/dL    Creatinine 0.85 0.52 - 1.04 mg/dL    GFR Estimate 62 >60 mL/min/[1.73_m2]      Comment:      Non  GFR Calc  Starting 12/18/2018, serum creatinine based estimated GFR (eGFR) will be   calculated using the Chronic Kidney Disease Epidemiology Collaboration   (CKD-EPI) equation.      GFR Estimate If Black 72 >60 mL/min/[1.73_m2]      Comment:       GFR Calc  Starting 12/18/2018, serum creatinine based estimated GFR (eGFR) will be   calculated using the Chronic Kidney Disease Epidemiology Collaboration   (CKD-EPI) equation.      Calcium 9.5 8.5 - 10.1 mg/dL       If you have any questions or concerns, please call the clinic at the number listed above.       Sincerely,      Chip Barlow PA-C/bela

## 2021-05-06 NOTE — NURSING NOTE
Prior to immunization administration, verified patients identity using patient s name and date of birth. Please see Immunization Activity for additional information.     Screening Questionnaire for Adult Immunization    Are you sick today?   No   Do you have allergies to medications, food, a vaccine component or latex?   No   Have you ever had a serious reaction after receiving a vaccination?   No   Do you have a long-term health problem with heart, lung, kidney, or metabolic disease (e.g., diabetes), asthma, a blood disorder, no spleen, complement component deficiency, a cochlear implant, or a spinal fluid leak?  Are you on long-term aspirin therapy?   No   Do you have cancer, leukemia, HIV/AIDS, or any other immune system problem?   No   Do you have a parent, brother, or sister with an immune system problem?   No   In the past 3 months, have you taken medications that affect  your immune system, such as prednisone, other steroids, or anticancer drugs; drugs for the treatment of rheumatoid arthritis, Crohn s disease, or psoriasis; or have you had radiation treatments?   No   Have you had a seizure, or a brain or other nervous system problem?   No   During the past year, have you received a transfusion of blood or blood    products, or been given immune (gamma) globulin or antiviral drug?   No   For women: Are you pregnant or is there a chance you could become       pregnant during the next month?   No   Have you received any vaccinations in the past 4 weeks?   No     Immunization questionnaire answers were all negative.        Per orders of Chip Barlow PA-C, injection of PPSV23 and Tdap given by Polly Gale MA. Patient instructed to remain in clinic for 15 minutes afterwards, and to report any adverse reaction to me immediately.       Screening performed by Polly Gale MA on 5/6/2021 at 2:06 PM.

## 2021-05-06 NOTE — PROGRESS NOTES
"SUBJECTIVE:   Ghada Magana is a 86 year old female who presents for Preventive Visit.      Patient has been advised of split billing requirements and indicates understanding: Yes   Are you in the first 12 months of your Medicare coverage?  No    Healthy Habits:     In general, how would you rate your overall health?  Fair    Frequency of exercise:  None    Do you usually eat at least 4 servings of fruit and vegetables a day, include whole grains    & fiber and avoid regularly eating high fat or \"junk\" foods?  Yes    Taking medications regularly:  Yes    Medication side effects:  None    Ability to successfully perform activities of daily living:  Telephone requires assistance, transportation requires assistance, shopping requires assistance, preparing meals requires assistance, housework requires assistance, bathing requires assistance, laundry requires assistance, medication administration requires assistance and money management requires assistance    Home Safety:  No safety concerns identified    Hearing Impairment:  No hearing concerns    In the past 6 months, have you been bothered by leaking of urine? Yes    In general, how would you rate your overall mental or emotional health?  Fair      PHQ-2 Total Score: 2    Additional concerns today:  No    Do you feel safe in your environment? Yes    Have you ever done Advance Care Planning? (For example, a Health Directive, POLST, or a discussion with a medical provider or your loved ones about your wishes): No, advance care planning information given to patient to review.  Advanced care planning was discussed at today's visit.     Update all refills and forms for daughter to help her move.   Left shoulder hurts when she uses it.        Fall risk  Fallen 2 or more times in the past year?: No  Any fall with injury in the past year?: No      Cognitive Screening Not appropriate due to known dementia    Do you have sleep apnea, excessive snoring or daytime " drowsiness?: Yes, daytime drowsiness sometimes, sometimes issues sleeping at night.     Reviewed and updated as needed this visit by clinical staff  Tobacco  Allergies  Meds   Med Hx  Surg Hx  Fam Hx  Soc Hx        Reviewed and updated as needed this visit by Provider                Social History     Tobacco Use     Smoking status: Never Smoker     Smokeless tobacco: Never Used   Substance Use Topics     Alcohol use: Yes     Comment: occasionally     If you drink alcohol do you typically have >3 drinks per day or >7 drinks per week? No    Alcohol Use 5/6/2021   Prescreen: >3 drinks/day or >7 drinks/week? No   Prescreen: >3 drinks/day or >7 drinks/week? -           Current providers sharing in care for this patient include:   Patient Care Team:  Chip Barlow PA-C as PCP - General  Chip Barolw PA-C as Referring Physician (Physician Assistant)  Dao Ordonez MD as MD (Neurology)  Sierra Chapin MD as Fellow (Infectious Diseases)  Henrry De Anda MD as MD (Neurology)  Henrry De Anda MD as Assigned Neuroscience Provider  Giulia Brown DO as Assigned PCP    The following health maintenance items are reviewed in Epic and correct as of today:  Health Maintenance Due   Topic Date Due     DEPRESSION ACTION PLAN  Never done     PHQ-9  Never done     ZOSTER IMMUNIZATION (1 of 2) Never done     DEXA  04/19/2014     FALL RISK ASSESSMENT  01/07/2017     DTAP/TDAP/TD IMMUNIZATION (3 - Td) 12/23/2018     BMP  11/20/2019     Pneumococcal Vaccine: Pediatrics (0 to 5 Years) and At-Risk Patients (6 to 64 Years) (1 of 2 - PPSV23) 01/10/2020     Pneumococcal Vaccine: 65+ Years (1 of 2 - PPSV23) 01/10/2020     Lab work is in process  Pneumonia Vaccine:Adults age 65+ who received Pneumovax (PPSV23) at 65 years or older: Should be given PCV13 > 1 year after their most recent PPSV23    Pertinent mammograms are reviewed under the imaging tab.    Review of Systems  "  Constitutional: Positive for chills. Negative for fever.   HENT: Negative for congestion and ear pain.    Eyes: Negative for pain.   Respiratory: Positive for cough.    Cardiovascular: Negative for chest pain and peripheral edema.   Gastrointestinal: Negative for abdominal pain, constipation, heartburn and hematochezia.   Breasts:  Negative for tenderness, breast mass and discharge.   Genitourinary: Negative for genital sores, hematuria, pelvic pain, vaginal bleeding and vaginal discharge.   Musculoskeletal: Positive for arthralgias and joint swelling.   Neurological: Negative for dizziness and headaches.   Psychiatric/Behavioral: The patient is not nervous/anxious.          OBJECTIVE:   /56 (BP Location: Right arm, Patient Position: Chair, Cuff Size: Adult Regular)   Pulse 88   Temp 97.5  F (36.4  C) (Tympanic)   Wt 51.9 kg (114 lb 6.4 oz)   LMP  (LMP Unknown)   SpO2 92%   Breastfeeding No   BMI 20.92 kg/m   Estimated body mass index is 20.92 kg/m  as calculated from the following:    Height as of 3/11/21: 1.575 m (5' 2\").    Weight as of this encounter: 51.9 kg (114 lb 6.4 oz).  Physical Exam  GENERAL: healthy, alert and no distress  HENT: ear canals and TM's normal, nose and mouth without ulcers or lesions  NECK: no adenopathy, no asymmetry, masses, or scars and thyroid normal to palpation  RESP: Generalized restrictive pattern. No wheeze, no cough, no crackle.   CV: regular rate and rhythm, normal S1 S2, no S3 or S4, no murmur, click or rub, no peripheral edema and peripheral pulses strong  ABDOMEN: soft, nontender, no hepatosplenomegaly, no masses and bowel sounds normal  MS: no gross musculoskeletal defects noted, no edema  SKIN: no suspicious lesions or rashes  NEURO: Normal strength and tone, mentation intact and speech normal  PSYCH: mentation appears normal, affect normal/bright  LYMPH: no cervical, supraclavicular, axillary, or inguinal adenopathy    Diagnostic Test Results:  Labs reviewed " "in Epic    ASSESSMENT / PLAN:   (Z00.00) Encounter for Medicare annual wellness exam  (primary encounter diagnosis)  Comment: Well person   Plan: Diet, exercise, wellness and other preventive recommendations related to health maintenance were discussed.  Follow up as needed for acute issues.  Physical exam in 1 year.     (G30.9,  F02.80) Alzheimer's dementia without behavioral disturbance, unspecified timing of dementia onset (H)  Comment:   Plan: Managed by neurology. She is very flat today. Says very little. Memory is not going well per family. Is moving to a memory care    (I10) Hypertension goal BP (blood pressure) < 140/90  Comment:   Plan: carvedilol (COREG) 25 MG tablet, amLODIPine         (NORVASC) 5 MG tablet, Basic metabolic panel          (Ca, Cl, CO2, Creat, Gluc, K, Na, BUN)        BP stable.     (F32.0) Mild major depression (H)  Comment:   Plan: mirtazapine (REMERON) 30 MG tablet, sertraline         (ZOLOFT) 100 MG tablet        Refills given. Stable. Might need to reduce these as I think they aren't helping her overall presentation but she did have improvements in eating and motivation when we started these     (J84.10) Pulmonary fibrosis (H)  Comment:   Plan: Seeing pulm. Refusing any care.     (R05) Chronic cough  Comment:   Plan: Seeing pulm. Refusing any care.     (J34.89) Drainage from nose  Comment:   Plan: ipratropium (ATROVENT) 0.06 % nasal spray            (Z86.19) History of Clostridium difficile infection  Comment:   Plan: Clostridium difficile Toxin B PCR        Recheck. This is as she is going into a memory care     Patient has been advised of split billing requirements and indicates understanding: Yes  COUNSELING:  Reviewed preventive health counseling, as reflected in patient instructions    Estimated body mass index is 20.92 kg/m  as calculated from the following:    Height as of 3/11/21: 1.575 m (5' 2\").    Weight as of this encounter: 51.9 kg (114 lb 6.4 oz).    She reports that she " has never smoked. She has never used smokeless tobacco.      Appropriate preventive services were discussed with this patient, including applicable screening as appropriate for cardiovascular disease, diabetes, osteopenia/osteoporosis, and glaucoma.  As appropriate for age/gender, discussed screening for colorectal cancer, prostate cancer, breast cancer, and cervical cancer. Checklist reviewing preventive services available has been given to the patient.    Reviewed patients plan of care and provided an AVS. The Basic Care Plan (routine screening as documented in Health Maintenance) for Ghada meets the Care Plan requirement. This Care Plan has been established and reviewed with the Patient.    Counseling Resources:  ATP IV Guidelines  Pooled Cohorts Equation Calculator  Breast Cancer Risk Calculator  Breast Cancer: Medication to Reduce Risk  FRAX Risk Assessment  ICSI Preventive Guidelines  Dietary Guidelines for Americans, 2010  USDA's MyPlate  ASA Prophylaxis  Lung CA Screening    [facility]    Identified Health Risks:

## 2021-05-06 NOTE — PATIENT INSTRUCTIONS
Patient Education   Personalized Prevention Plan  You are due for the preventive services outlined below.  Your care team is available to assist you in scheduling these services.  If you have already completed any of these items, please share that information with your care team to update in your medical record.  Health Maintenance Due   Topic Date Due     ANNUAL REVIEW OF HM ORDERS  Never done     Depression Action Plan  Never done     Depression Assessment  Never done     Zoster (Shingles) Vaccine (1 of 2) Never done     Osteoporosis Screening  04/19/2014     FALL RISK ASSESSMENT  01/07/2017     Diptheria Tetanus Pertussis (DTAP/TDAP/TD) Vaccine (3 - Td) 12/23/2018     Basic Metabolic Panel  11/20/2019     Pneumococcal Vaccine (1 of 2 - PPSV23) 01/10/2020     Pneumococcal Vaccine (1 of 2 - PPSV23) 01/10/2020     Discuss Advance Care Planning  03/10/2020

## 2021-05-10 ENCOUNTER — TELEPHONE (OUTPATIENT)
Dept: FAMILY MEDICINE | Facility: CLINIC | Age: 86
End: 2021-05-10

## 2021-05-10 NOTE — TELEPHONE ENCOUNTER
Patient being admitted to [facility]. Nurse going through medication wondering if patient should continue Aspirin 81 mg for DVT     Huddled with PCP, patient does not need to take this medication    Called and informed Angelica. She asked for an updated medication list to be faxed to  [phone]    Med list faxed.        Eleni Ndiaye RN

## 2021-05-17 ENCOUNTER — DOCUMENTATION ONLY (OUTPATIENT)
Dept: OTHER | Facility: CLINIC | Age: 86
End: 2021-05-17

## 2021-05-25 ENCOUNTER — TELEPHONE (OUTPATIENT)
Dept: FAMILY MEDICINE | Facility: CLINIC | Age: 86
End: 2021-05-25

## 2021-05-25 NOTE — TELEPHONE ENCOUNTER
Forms present to fill out for FMLA.  Faxed to Jasbir Can (patient might have brought to her appointment 5/24/21)    Required two signatures.    Gave to Cain to sign the second one.       Viji Gonzalez/  Malcolm Chapin

## 2021-06-01 ENCOUNTER — DOCUMENTATION ONLY (OUTPATIENT)
Dept: OTHER | Facility: CLINIC | Age: 86
End: 2021-06-01

## 2021-09-14 ENCOUNTER — OFFICE VISIT (OUTPATIENT)
Dept: NEUROLOGY | Facility: CLINIC | Age: 86
End: 2021-09-14
Attending: PSYCHIATRY & NEUROLOGY
Payer: MEDICARE

## 2021-09-14 VITALS — HEART RATE: 77 BPM | OXYGEN SATURATION: 90 % | DIASTOLIC BLOOD PRESSURE: 63 MMHG | SYSTOLIC BLOOD PRESSURE: 107 MMHG

## 2021-09-14 DIAGNOSIS — G30.1 LATE ONSET ALZHEIMER'S DISEASE WITHOUT BEHAVIORAL DISTURBANCE (H): Primary | ICD-10-CM

## 2021-09-14 DIAGNOSIS — F02.80 LATE ONSET ALZHEIMER'S DISEASE WITHOUT BEHAVIORAL DISTURBANCE (H): Primary | ICD-10-CM

## 2021-09-14 PROCEDURE — 99214 OFFICE O/P EST MOD 30 MIN: CPT | Performed by: PSYCHIATRY & NEUROLOGY

## 2021-09-14 PROCEDURE — G0463 HOSPITAL OUTPT CLINIC VISIT: HCPCS

## 2021-09-14 RX ORDER — MEMANTINE HYDROCHLORIDE 10 MG/1
10 TABLET ORAL 2 TIMES DAILY
Qty: 182 TABLET | Refills: 3 | Status: SHIPPED | OUTPATIENT
Start: 2021-09-14

## 2021-09-14 RX ORDER — DONEPEZIL HYDROCHLORIDE 10 MG/1
10 TABLET, FILM COATED ORAL AT BEDTIME
Qty: 91 TABLET | Refills: 3 | Status: SHIPPED | OUTPATIENT
Start: 2021-09-14

## 2021-09-14 ASSESSMENT — MONTREAL COGNITIVE ASSESSMENT (MOCA)
13. ORIENTATION SUBSCORE: 1
WHAT IS THE TOTAL SCORE (OUT OF 30): 5
11. FOR EACH PAIR OF WORDS, WHAT CATEGORY DO THEY BELONG TO (OUT OF 2): 0
10. [FLUENCY] NAME WORDS STARTING WITH DESIGNATED LETTER: 0
VISUOSPATIAL/EXECUTIVE SUBSCORE: 0
9. REPEAT EACH SENTENCE: 1
7. [VIGILENCE] TAP WHEN HEARING DESIGNATED LETTER: 1
4. NAME EACH OF THE THREE ANIMALS SHOWN: 1
6. READ LIST OF DIGITS [FORWARD/BACKWARD]: 1
WHAT LEVEL OF EDUCATION WAS ATTAINED: 0
8. SERIAL SUBTRACTION OF 7S: 0
12. MEMORY INDEX SCORE: 0

## 2021-09-14 NOTE — PATIENT INSTRUCTIONS
AFTER VISIT SUMMARY (AVS):    At today's visit we thoroughly discussed current symptoms, cognitive test results (slightly worse, though I question effort), available treatment options, and the plan.    We decided not to make medication changes.  Your prescriptions were refilled.    Next follow-up appointment is in the next 12 months or earlier if needed.    Please do not hesitate to call me with any questions or concerns.    Thanks.

## 2021-09-14 NOTE — PROGRESS NOTES
ESTABLISHED PATIENT NEUROLOGY NOTE    DATE OF VISIT: 2021  CLINIC LOCATION: [facility]  MRN: [id number]  PATIENT NAME: Ghada Magana  YOB: 1935    PCP: MERON EASON PA-C    REASON FOR VISIT:   Chief Complaint   Patient presents with     Memory Loss     Follow Up MOCA 1      SUBJECTIVE:                                                      HISTORY OF PRESENT ILLNESS: Patient is here to follow up regarding Alzheimer's disease.  The last visit was on 3/11/2021.  No medication changes were done at that time. Please refer to my initial/other prior notes for further information.  The patient is accompanied by her daughter, who participates in the interview today.    Since the last visit, the patient moved to assisted living at [facility].  She adjusted well.  Daughter feels that her memory is stable.  The patient is not interested to talk much today.  She denies interval development of new focal neurological symptoms.  She is on 10 mg of donepezil at bedtime and 10 mg of Namenda twice daily without noticeable side effects.    On review of systems, patient endorses no additional active complaints. Medications, allergies, family and social history were also reviewed. There are no changes reported by patient.  REVIEW OF SYSTEMS:                                                    10-system review was completed. Pertinent positives are included in HPI. The remainder of ROS is negative.  EXAM:                                                    Physical Exam:   Vitals: /63   Pulse 77   LMP  (LMP Unknown)   SpO2 90%   [place] Cognitive Assessment:    [place] Cognitive Assessment (MOCA)  Visuospatial/Executive : 0  Namin  Attention - Digits: 1  Attention - Letters: 1  Attention - Subtraction: 0  Language - Repeat: 1  Language - Fluency : 0  Abstraction: 0  Delayed Recall: 0  Orientation: 1  Education: 0  MOCA Score: 5  Administered by: : Tonie Lara  Cognitive Assessment Score:  MOCA Score: 5/30.     General: pt is in NAD, cooperative.  Skin: normal turgor, moist mucous membranes, no lesions/rashes noticed.  HEENT: ATNC, white sclera, normal conjunctiva.  Respiratory: Symmetric lung excursion, no accessory respiratory muscle use.  Abdomen: Non distended.  Neurological: awake, cooperative, follows commands, no exam changes compared to the previous visit.  ASSESSMENT AND PLAN:                                                    Assessment: 86-year-old female patient with Alzheimer's disease presents for follow-up.  Family reports that her memory is stable.    We had a detailed discussion with the patient and her family regarding her symptoms, cognitive test results, and the plan.  Her MoCA today is 5/30, compared to 8/30 in September 2020 and 2019 and 13/30 in October 2018, although I question effort/engagement.  In the past we discussed that donepezil could be switched to galantamine or rivastigmine to see if it provides additional cognitive benefit.  Given the reported stability of her symptoms, no medication changes are necessary.    Diagnoses:    ICD-10-CM    1. Late onset Alzheimer's disease without behavioral disturbance (H)  G30.1     F02.80      Plan: At today's visit we thoroughly discussed current symptoms, cognitive test results (slightly worse, though I question effort), available treatment options, and the plan.    We decided not to make medication changes.  Her prescriptions were refilled.    Next follow-up appointment is in the next 12 months or earlier if needed.    Total Time: 31 minutes spent on the date of the encounter doing chart review, history and exam, documentation and further activities per the note.    Henrry De Anda MD  [facility] Neurology  (Chart documentation was completed in part with Dragon voice-recognition software. Even though reviewed, some grammatical, spelling, and word errors may remain.)

## 2021-09-14 NOTE — LETTER
9/14/2021         RE: Ghada Magana  [address]  [place] [address]        Dear Colleague,    Thank you for referring your patient, Ghada Magana, to the [facility] [place]. Please see a copy of my visit note below.    ESTABLISHED PATIENT NEUROLOGY NOTE    DATE OF VISIT: 9/14/2021  CLINIC LOCATION: [facility]  MRN: [id number]  PATIENT NAME: Ghada Magana  YOB: 1935    PCP: MERON EASON PA-C    REASON FOR VISIT:   Chief Complaint   Patient presents with     Memory Loss     Follow Up MOCA 1      SUBJECTIVE:                                                      HISTORY OF PRESENT ILLNESS: Patient is here to follow up regarding Alzheimer's disease.  The last visit was on 3/11/2021.  No medication changes were done at that time. Please refer to my initial/other prior notes for further information.  The patient is accompanied by her daughter, who participates in the interview today.    Since the last visit, the patient moved to assisted living at [facility].  She adjusted well.  Daughter feels that her memory is stable.  The patient is not interested to talk much today.  She denies interval development of new focal neurological symptoms.  She is on 10 mg of donepezil at bedtime and 10 mg of Namenda twice daily without noticeable side effects.    On review of systems, patient endorses no additional active complaints. Medications, allergies, family and social history were also reviewed. There are no changes reported by patient.  REVIEW OF SYSTEMS:                                                    10-system review was completed. Pertinent positives are included in HPI. The remainder of ROS is negative.  EXAM:                                                    Physical Exam:   Vitals: /63   Pulse 77   LMP  (LMP Unknown)   SpO2 90%   [place] Cognitive Assessment:    [place] Cognitive Assessment  (MOCA)  Visuospatial/Executive : 0  Namin  Attention - Digits: 1  Attention - Letters: 1  Attention - Subtraction: 0  Language - Repeat: 1  Language - Fluency : 0  Abstraction: 0  Delayed Recall: 0  Orientation: 1  Education: 0  MOCA Score: 5  Administered by: : Tonie DAWSON     [place] Cognitive Assessment Score:  MOCA Score: 530.     General: pt is in NAD, cooperative.  Skin: normal turgor, moist mucous membranes, no lesions/rashes noticed.  HEENT: ATNC, white sclera, normal conjunctiva.  Respiratory: Symmetric lung excursion, no accessory respiratory muscle use.  Abdomen: Non distended.  Neurological: awake, cooperative, follows commands, no exam changes compared to the previous visit.  ASSESSMENT AND PLAN:                                                    Assessment: 86-year-old female patient with Alzheimer's disease presents for follow-up.  Family reports that her memory is stable.    We had a detailed discussion with the patient and her family regarding her symptoms, cognitive test results, and the plan.  Her MoCA today is 5/30, compared to 830 in 2020 and  and 13/30 in 2018, although I question effort/engagement.  In the past we discussed that donepezil could be switched to galantamine or rivastigmine to see if it provides additional cognitive benefit.  Given the reported stability of her symptoms, no medication changes are necessary.    Diagnoses:    ICD-10-CM    1. Late onset Alzheimer's disease without behavioral disturbance (H)  G30.1     F02.80      Plan: At today's visit we thoroughly discussed current symptoms, cognitive test results (slightly worse, though I question effort), available treatment options, and the plan.    We decided not to make medication changes.  Her prescriptions were refilled.    Next follow-up appointment is in the next 12 months or earlier if needed.    Total Time: 31 minutes spent on the date of the encounter doing chart review, history and exam,  documentation and further activities per the note.    Henrry De Anda MD  [facility] Neurology  (Chart documentation was completed in part with Dragon voice-recognition software. Even though reviewed, some grammatical, spelling, and word errors may remain.)      Again, thank you for allowing me to participate in the care of your patient.        Sincerely,        Henrry De Anda MD

## 2021-10-19 PROBLEM — F32.9 MAJOR DEPRESSION: Status: ACTIVE | Noted: 2018-11-15

## 2022-09-13 ENCOUNTER — TELEPHONE (OUTPATIENT)
Dept: NEUROLOGY | Facility: CLINIC | Age: 87
End: 2022-09-13

## 2022-09-13 NOTE — TELEPHONE ENCOUNTER
Outbound call to Lita (patient's daughter has Consent to Communicate from 1/2019) [phone]. LM to remind her of Ghada's appointment with Dr De Anda tomorrow Sept 14th at 2pm. Also left clinic number if they have questions